# Patient Record
Sex: FEMALE | Race: WHITE | Employment: OTHER | URBAN - METROPOLITAN AREA
[De-identification: names, ages, dates, MRNs, and addresses within clinical notes are randomized per-mention and may not be internally consistent; named-entity substitution may affect disease eponyms.]

---

## 2018-09-27 ENCOUNTER — HOSPITAL ENCOUNTER (OUTPATIENT)
Age: 83
Discharge: HOME | End: 2018-09-27
Payer: MEDICARE

## 2018-09-27 LAB
ALBUMIN SERPL-MCNC: 4.2 G/DL (ref 3.5–5.2)
ALP BLD-CCNC: 48 U/L (ref 35–104)
ALT SERPL-CCNC: 14 U/L (ref 0–32)
ANION GAP SERPL CALCULATED.3IONS-SCNC: 15 MMOL/L (ref 7–16)
AST SERPL-CCNC: 21 U/L (ref 0–31)
BASOPHILS ABSOLUTE: 0.03 E9/L (ref 0–0.2)
BASOPHILS RELATIVE PERCENT: 0.4 % (ref 0–2)
BILIRUB SERPL-MCNC: 0.4 MG/DL (ref 0–1.2)
BUN BLDV-MCNC: 12 MG/DL (ref 8–23)
C-REACTIVE PROTEIN: 0.1 MG/DL (ref 0–0.4)
CALCIUM SERPL-MCNC: 9.6 MG/DL (ref 8.6–10.2)
CHLORIDE BLD-SCNC: 98 MMOL/L (ref 98–107)
CO2: 30 MMOL/L (ref 22–29)
CREAT SERPL-MCNC: 0.8 MG/DL (ref 0.5–1)
EOSINOPHILS ABSOLUTE: 0 E9/L (ref 0.05–0.5)
EOSINOPHILS RELATIVE PERCENT: 0 % (ref 0–6)
GFR AFRICAN AMERICAN: >60
GFR NON-AFRICAN AMERICAN: >60 ML/MIN/1.73
GLUCOSE BLD-MCNC: 144 MG/DL (ref 74–109)
HCT VFR BLD CALC: 43.9 % (ref 34–48)
HEMOGLOBIN: 14.5 G/DL (ref 11.5–15.5)
IMMATURE GRANULOCYTES #: 0.04 E9/L
IMMATURE GRANULOCYTES %: 0.5 % (ref 0–5)
LYMPHOCYTES ABSOLUTE: 0.79 E9/L (ref 1.5–4)
LYMPHOCYTES RELATIVE PERCENT: 10.6 % (ref 20–42)
MCH RBC QN AUTO: 31.9 PG (ref 26–35)
MCHC RBC AUTO-ENTMCNC: 33 % (ref 32–34.5)
MCV RBC AUTO: 96.7 FL (ref 80–99.9)
MONOCYTES ABSOLUTE: 0.34 E9/L (ref 0.1–0.95)
MONOCYTES RELATIVE PERCENT: 4.6 % (ref 2–12)
NEUTROPHILS ABSOLUTE: 6.22 E9/L (ref 1.8–7.3)
NEUTROPHILS RELATIVE PERCENT: 83.9 % (ref 43–80)
PDW BLD-RTO: 13.1 FL (ref 11.5–15)
PLATELET # BLD: 206 E9/L (ref 130–450)
PMV BLD AUTO: 10.3 FL (ref 7–12)
POTASSIUM SERPL-SCNC: 4 MMOL/L (ref 3.5–5)
RBC # BLD: 4.54 E12/L (ref 3.5–5.5)
SEDIMENTATION RATE, ERYTHROCYTE: 15 MM/HR (ref 0–20)
SODIUM BLD-SCNC: 143 MMOL/L (ref 132–146)
TOTAL PROTEIN: 7 G/DL (ref 6.4–8.3)
WBC # BLD: 7.4 E9/L (ref 4.5–11.5)

## 2018-09-27 PROCEDURE — 82784 ASSAY IGA/IGD/IGG/IGM EACH: CPT

## 2018-09-27 PROCEDURE — 80053 COMPREHEN METABOLIC PANEL: CPT

## 2018-09-27 PROCEDURE — 83516 IMMUNOASSAY NONANTIBODY: CPT

## 2018-09-27 PROCEDURE — 85651 RBC SED RATE NONAUTOMATED: CPT

## 2018-09-27 PROCEDURE — 85025 COMPLETE CBC W/AUTO DIFF WBC: CPT

## 2018-09-27 PROCEDURE — 86316 IMMUNOASSAY TUMOR OTHER: CPT

## 2018-09-27 PROCEDURE — 36415 COLL VENOUS BLD VENIPUNCTURE: CPT

## 2018-09-27 PROCEDURE — 86140 C-REACTIVE PROTEIN: CPT

## 2018-09-28 LAB — IGA: 102 MG/DL (ref 70–400)

## 2018-09-30 LAB
CHROMOGRANIN A: 290 NG/ML (ref 0–95)
GLIADIN ANTIBODIES IGA: 3 UNITS (ref 0–19)
GLIADIN ANTIBODIES IGG: 4 UNITS (ref 0–19)
TISSUE TRANSGLUTAMINASE ANTIBODY: 2 U/ML (ref 0–5)
TISSUE TRANSGLUTAMINASE IGA: 0 U/ML (ref 0–3)

## 2023-02-17 ENCOUNTER — APPOINTMENT (OUTPATIENT)
Dept: GENERAL RADIOLOGY | Age: 88
End: 2023-02-17
Payer: MEDICARE

## 2023-02-17 ENCOUNTER — APPOINTMENT (OUTPATIENT)
Dept: CT IMAGING | Age: 88
End: 2023-02-17
Payer: MEDICARE

## 2023-02-17 ENCOUNTER — HOSPITAL ENCOUNTER (INPATIENT)
Age: 88
LOS: 5 days | Discharge: OTHER INSTITUTION | End: 2023-02-23
Attending: EMERGENCY MEDICINE | Admitting: INTERNAL MEDICINE
Payer: MEDICARE

## 2023-02-17 DIAGNOSIS — J96.01 ACUTE RESPIRATORY FAILURE WITH HYPOXIA (HCC): ICD-10-CM

## 2023-02-17 DIAGNOSIS — K56.609 SBO (SMALL BOWEL OBSTRUCTION) (HCC): ICD-10-CM

## 2023-02-17 DIAGNOSIS — R79.89 ELEVATED LACTIC ACID LEVEL: ICD-10-CM

## 2023-02-17 DIAGNOSIS — R73.9 HYPERGLYCEMIA: ICD-10-CM

## 2023-02-17 DIAGNOSIS — N17.9 AKI (ACUTE KIDNEY INJURY) (HCC): ICD-10-CM

## 2023-02-17 DIAGNOSIS — J18.9 PNEUMONIA OF LEFT LUNG DUE TO INFECTIOUS ORGANISM, UNSPECIFIED PART OF LUNG: Primary | ICD-10-CM

## 2023-02-17 LAB
ALBUMIN SERPL-MCNC: 3.2 G/DL (ref 3.5–5.2)
ALP BLD-CCNC: 41 U/L (ref 35–104)
ALT SERPL-CCNC: 10 U/L (ref 0–32)
ANION GAP SERPL CALCULATED.3IONS-SCNC: 18 MMOL/L (ref 7–16)
APTT: 29.1 SEC (ref 24.5–35.1)
AST SERPL-CCNC: 22 U/L (ref 0–31)
BASOPHILS ABSOLUTE: 0 E9/L (ref 0–0.2)
BASOPHILS RELATIVE PERCENT: 0 % (ref 0–2)
BETA-HYDROXYBUTYRATE: 2.24 MMOL/L (ref 0.02–0.27)
BILIRUB SERPL-MCNC: 0.8 MG/DL (ref 0–1.2)
BUN BLDV-MCNC: 68 MG/DL (ref 6–23)
BURR CELLS: ABNORMAL
CALCIUM SERPL-MCNC: 9.2 MG/DL (ref 8.6–10.2)
CHLORIDE BLD-SCNC: 85 MMOL/L (ref 98–107)
CO2: 28 MMOL/L (ref 22–29)
CREAT SERPL-MCNC: 2.1 MG/DL (ref 0.5–1)
EOSINOPHILS ABSOLUTE: 0 E9/L (ref 0.05–0.5)
EOSINOPHILS RELATIVE PERCENT: 0 % (ref 0–6)
GFR SERPL CREATININE-BSD FRML MDRD: 21 ML/MIN/1.73
GLUCOSE BLD-MCNC: 236 MG/DL (ref 74–99)
HCT VFR BLD CALC: 43.7 % (ref 34–48)
HEMOGLOBIN: 14.6 G/DL (ref 11.5–15.5)
INFLUENZA A BY PCR: NOT DETECTED
INFLUENZA B BY PCR: NOT DETECTED
INR BLD: 1.3
LACTIC ACID, SEPSIS: 4.2 MMOL/L (ref 0.5–1.9)
LYMPHOCYTES ABSOLUTE: 0.53 E9/L (ref 1.5–4)
LYMPHOCYTES RELATIVE PERCENT: 5 % (ref 20–42)
MCH RBC QN AUTO: 33.6 PG (ref 26–35)
MCHC RBC AUTO-ENTMCNC: 33.4 % (ref 32–34.5)
MCV RBC AUTO: 100.5 FL (ref 80–99.9)
METAMYELOCYTES RELATIVE PERCENT: 1 % (ref 0–1)
MONOCYTES ABSOLUTE: 0.21 E9/L (ref 0.1–0.95)
MONOCYTES RELATIVE PERCENT: 2 % (ref 2–12)
NEUTROPHILS ABSOLUTE: 9.86 E9/L (ref 1.8–7.3)
NEUTROPHILS RELATIVE PERCENT: 92 % (ref 43–80)
OVALOCYTES: ABNORMAL
PDW BLD-RTO: 13 FL (ref 11.5–15)
PH VENOUS: 7.53 (ref 7.35–7.45)
PLATELET # BLD: 144 E9/L (ref 130–450)
PMV BLD AUTO: 13.1 FL (ref 7–12)
POIKILOCYTES: ABNORMAL
POLYCHROMASIA: ABNORMAL
POTASSIUM REFLEX MAGNESIUM: 4.6 MMOL/L (ref 3.5–5)
PROTHROMBIN TIME: 15.3 SEC (ref 9.3–12.4)
RBC # BLD: 4.35 E12/L (ref 3.5–5.5)
SARS-COV-2, NAAT: NOT DETECTED
SODIUM BLD-SCNC: 131 MMOL/L (ref 132–146)
TOTAL PROTEIN: 5.5 G/DL (ref 6.4–8.3)
TROPONIN, HIGH SENSITIVITY: 167 NG/L (ref 0–9)
WBC # BLD: 10.6 E9/L (ref 4.5–11.5)

## 2023-02-17 PROCEDURE — 82800 BLOOD PH: CPT

## 2023-02-17 PROCEDURE — 80053 COMPREHEN METABOLIC PANEL: CPT

## 2023-02-17 PROCEDURE — 82533 TOTAL CORTISOL: CPT

## 2023-02-17 PROCEDURE — 84145 PROCALCITONIN (PCT): CPT

## 2023-02-17 PROCEDURE — 99285 EMERGENCY DEPT VISIT HI MDM: CPT

## 2023-02-17 PROCEDURE — 74176 CT ABD & PELVIS W/O CONTRAST: CPT

## 2023-02-17 PROCEDURE — 87040 BLOOD CULTURE FOR BACTERIA: CPT

## 2023-02-17 PROCEDURE — 85025 COMPLETE CBC W/AUTO DIFF WBC: CPT

## 2023-02-17 PROCEDURE — 83605 ASSAY OF LACTIC ACID: CPT

## 2023-02-17 PROCEDURE — 71045 X-RAY EXAM CHEST 1 VIEW: CPT

## 2023-02-17 PROCEDURE — 96374 THER/PROPH/DIAG INJ IV PUSH: CPT

## 2023-02-17 PROCEDURE — 93005 ELECTROCARDIOGRAM TRACING: CPT | Performed by: STUDENT IN AN ORGANIZED HEALTH CARE EDUCATION/TRAINING PROGRAM

## 2023-02-17 PROCEDURE — 84484 ASSAY OF TROPONIN QUANT: CPT

## 2023-02-17 PROCEDURE — 96361 HYDRATE IV INFUSION ADD-ON: CPT

## 2023-02-17 PROCEDURE — 6360000002 HC RX W HCPCS: Performed by: STUDENT IN AN ORGANIZED HEALTH CARE EDUCATION/TRAINING PROGRAM

## 2023-02-17 PROCEDURE — 71250 CT THORAX DX C-: CPT

## 2023-02-17 PROCEDURE — 85730 THROMBOPLASTIN TIME PARTIAL: CPT

## 2023-02-17 PROCEDURE — 51702 INSERT TEMP BLADDER CATH: CPT

## 2023-02-17 PROCEDURE — 85610 PROTHROMBIN TIME: CPT

## 2023-02-17 PROCEDURE — 82010 KETONE BODYS QUAN: CPT

## 2023-02-17 PROCEDURE — 87635 SARS-COV-2 COVID-19 AMP PRB: CPT

## 2023-02-17 PROCEDURE — 87502 INFLUENZA DNA AMP PROBE: CPT

## 2023-02-17 PROCEDURE — 2580000003 HC RX 258: Performed by: STUDENT IN AN ORGANIZED HEALTH CARE EDUCATION/TRAINING PROGRAM

## 2023-02-17 PROCEDURE — 36415 COLL VENOUS BLD VENIPUNCTURE: CPT

## 2023-02-17 RX ORDER — 0.9 % SODIUM CHLORIDE 0.9 %
1000 INTRAVENOUS SOLUTION INTRAVENOUS ONCE
Status: COMPLETED | OUTPATIENT
Start: 2023-02-17 | End: 2023-02-18

## 2023-02-17 RX ORDER — 0.9 % SODIUM CHLORIDE 0.9 %
500 INTRAVENOUS SOLUTION INTRAVENOUS ONCE
Status: COMPLETED | OUTPATIENT
Start: 2023-02-17 | End: 2023-02-17

## 2023-02-17 RX ORDER — ONDANSETRON 2 MG/ML
4 INJECTION INTRAMUSCULAR; INTRAVENOUS ONCE
Status: COMPLETED | OUTPATIENT
Start: 2023-02-17 | End: 2023-02-17

## 2023-02-17 RX ADMIN — SODIUM CHLORIDE 500 ML: 9 INJECTION, SOLUTION INTRAVENOUS at 21:51

## 2023-02-17 RX ADMIN — ONDANSETRON 4 MG: 2 INJECTION INTRAMUSCULAR; INTRAVENOUS at 21:52

## 2023-02-17 ASSESSMENT — ENCOUNTER SYMPTOMS
DIARRHEA: 0
VOMITING: 1
SHORTNESS OF BREATH: 0
BACK PAIN: 0
NAUSEA: 1
COUGH: 0
ABDOMINAL PAIN: 1
COLOR CHANGE: 0

## 2023-02-17 ASSESSMENT — LIFESTYLE VARIABLES: HOW OFTEN DO YOU HAVE A DRINK CONTAINING ALCOHOL: NEVER

## 2023-02-18 ENCOUNTER — APPOINTMENT (OUTPATIENT)
Dept: CT IMAGING | Age: 88
End: 2023-02-18
Payer: MEDICARE

## 2023-02-18 ENCOUNTER — ANESTHESIA (OUTPATIENT)
Dept: OPERATING ROOM | Age: 88
End: 2023-02-18
Payer: MEDICARE

## 2023-02-18 ENCOUNTER — APPOINTMENT (OUTPATIENT)
Dept: GENERAL RADIOLOGY | Age: 88
End: 2023-02-18
Payer: MEDICARE

## 2023-02-18 ENCOUNTER — ANESTHESIA EVENT (OUTPATIENT)
Dept: OPERATING ROOM | Age: 88
End: 2023-02-18
Payer: MEDICARE

## 2023-02-18 PROBLEM — J18.9 PNEUMONIA OF LEFT LUNG DUE TO INFECTIOUS ORGANISM: Status: ACTIVE | Noted: 2023-02-18

## 2023-02-18 PROBLEM — K56.609 SBO (SMALL BOWEL OBSTRUCTION) (HCC): Status: ACTIVE | Noted: 2023-02-18

## 2023-02-18 PROBLEM — K40.30 INCARCERATED INGUINAL HERNIA, UNILATERAL: Status: ACTIVE | Noted: 2023-02-18

## 2023-02-18 LAB
ALBUMIN SERPL-MCNC: 2.6 G/DL (ref 3.5–5.2)
ALP BLD-CCNC: 39 U/L (ref 35–104)
ALT SERPL-CCNC: 9 U/L (ref 0–32)
ANION GAP SERPL CALCULATED.3IONS-SCNC: 18 MMOL/L (ref 7–16)
AST SERPL-CCNC: 22 U/L (ref 0–31)
BACTERIA: ABNORMAL /HPF
BASOPHILS ABSOLUTE: 0 E9/L (ref 0–0.2)
BASOPHILS RELATIVE PERCENT: 0.5 % (ref 0–2)
BILIRUB SERPL-MCNC: 0.7 MG/DL (ref 0–1.2)
BILIRUBIN URINE: NEGATIVE
BLOOD, URINE: NEGATIVE
BUN BLDV-MCNC: 66 MG/DL (ref 6–23)
CALCIUM SERPL-MCNC: 8.3 MG/DL (ref 8.6–10.2)
CHLORIDE BLD-SCNC: 93 MMOL/L (ref 98–107)
CHOLESTEROL, TOTAL: 88 MG/DL (ref 0–199)
CLARITY: ABNORMAL
CO2: 26 MMOL/L (ref 22–29)
COLOR: YELLOW
CORTISOL TOTAL: 48.41 MCG/DL (ref 2.68–18.4)
CREAT SERPL-MCNC: 1.8 MG/DL (ref 0.5–1)
EKG ATRIAL RATE: 79 BPM
EKG Q-T INTERVAL: 472 MS
EKG QRS DURATION: 150 MS
EKG QTC CALCULATION (BAZETT): 509 MS
EKG R AXIS: -80 DEGREES
EKG T AXIS: 82 DEGREES
EKG VENTRICULAR RATE: 70 BPM
EOSINOPHILS ABSOLUTE: 0 E9/L (ref 0.05–0.5)
EOSINOPHILS RELATIVE PERCENT: 0 % (ref 0–6)
FOLATE: >20 NG/ML (ref 4.8–24.2)
GFR SERPL CREATININE-BSD FRML MDRD: 26 ML/MIN/1.73
GLUCOSE BLD-MCNC: 195 MG/DL (ref 74–99)
GLUCOSE URINE: NEGATIVE MG/DL
HBA1C MFR BLD: 9.5 % (ref 4–5.6)
HCT VFR BLD CALC: 41.2 % (ref 34–48)
HDLC SERPL-MCNC: 38 MG/DL
HEMOGLOBIN: 13.2 G/DL (ref 11.5–15.5)
IRON SATURATION: 13 % (ref 15–50)
IRON: 27 MCG/DL (ref 37–145)
KETONES, URINE: NEGATIVE MG/DL
LACTIC ACID, SEPSIS: 1.7 MMOL/L (ref 0.5–1.9)
LACTIC ACID: 2.1 MMOL/L (ref 0.5–2.2)
LDL CHOLESTEROL CALCULATED: 23 MG/DL (ref 0–99)
LEUKOCYTE ESTERASE, URINE: ABNORMAL
LV EF: 60 %
LVEF MODALITY: NORMAL
LYMPHOCYTES ABSOLUTE: 1.22 E9/L (ref 1.5–4)
LYMPHOCYTES RELATIVE PERCENT: 10.4 % (ref 20–42)
MAGNESIUM: 1.9 MG/DL (ref 1.6–2.6)
MCH RBC QN AUTO: 32.7 PG (ref 26–35)
MCHC RBC AUTO-ENTMCNC: 32 % (ref 32–34.5)
MCV RBC AUTO: 102 FL (ref 80–99.9)
METAMYELOCYTES RELATIVE PERCENT: 1.7 % (ref 0–1)
METER GLUCOSE: 155 MG/DL (ref 74–99)
MONOCYTES ABSOLUTE: 0.49 E9/L (ref 0.1–0.95)
MONOCYTES RELATIVE PERCENT: 3.5 % (ref 2–12)
NEUTROPHILS ABSOLUTE: 10.49 E9/L (ref 1.8–7.3)
NEUTROPHILS RELATIVE PERCENT: 84.3 % (ref 43–80)
NITRITE, URINE: NEGATIVE
PDW BLD-RTO: 13.2 FL (ref 11.5–15)
PH UA: 5 (ref 5–9)
PHOSPHORUS: 5 MG/DL (ref 2.5–4.5)
PLATELET # BLD: 125 E9/L (ref 130–450)
PMV BLD AUTO: 12.6 FL (ref 7–12)
POTASSIUM SERPL-SCNC: 4.2 MMOL/L (ref 3.5–5)
PROCALCITONIN: 13.38 NG/ML (ref 0–0.08)
PROTEIN UA: ABNORMAL MG/DL
RBC # BLD: 4.04 E12/L (ref 3.5–5.5)
RBC UA: ABNORMAL /HPF (ref 0–2)
SODIUM BLD-SCNC: 137 MMOL/L (ref 132–146)
SPECIFIC GRAVITY UA: >=1.03 (ref 1–1.03)
TOTAL IRON BINDING CAPACITY: 202 MCG/DL (ref 250–450)
TOTAL PROTEIN: 5.2 G/DL (ref 6.4–8.3)
TRIGL SERPL-MCNC: 134 MG/DL (ref 0–149)
TROPONIN, HIGH SENSITIVITY: 120 NG/L (ref 0–9)
TSH SERPL DL<=0.05 MIU/L-ACNC: 0.78 UIU/ML (ref 0.27–4.2)
UROBILINOGEN, URINE: 0.2 E.U./DL
VITAMIN B-12: 238 PG/ML (ref 211–946)
VLDLC SERPL CALC-MCNC: 27 MG/DL
WBC # BLD: 12.2 E9/L (ref 4.5–11.5)
WBC UA: >20 /HPF (ref 0–5)

## 2023-02-18 PROCEDURE — 3700000001 HC ADD 15 MINUTES (ANESTHESIA): Performed by: SURGERY

## 2023-02-18 PROCEDURE — 85025 COMPLETE CBC W/AUTO DIFF WBC: CPT

## 2023-02-18 PROCEDURE — 82746 ASSAY OF FOLIC ACID SERUM: CPT

## 2023-02-18 PROCEDURE — 6370000000 HC RX 637 (ALT 250 FOR IP): Performed by: SURGERY

## 2023-02-18 PROCEDURE — 6360000002 HC RX W HCPCS: Performed by: SURGERY

## 2023-02-18 PROCEDURE — 36415 COLL VENOUS BLD VENIPUNCTURE: CPT

## 2023-02-18 PROCEDURE — 6360000004 HC RX CONTRAST MEDICATION: Performed by: INTERNAL MEDICINE

## 2023-02-18 PROCEDURE — 82607 VITAMIN B-12: CPT

## 2023-02-18 PROCEDURE — 83735 ASSAY OF MAGNESIUM: CPT

## 2023-02-18 PROCEDURE — 74018 RADEX ABDOMEN 1 VIEW: CPT

## 2023-02-18 PROCEDURE — 84100 ASSAY OF PHOSPHORUS: CPT

## 2023-02-18 PROCEDURE — 83036 HEMOGLOBIN GLYCOSYLATED A1C: CPT

## 2023-02-18 PROCEDURE — 82962 GLUCOSE BLOOD TEST: CPT

## 2023-02-18 PROCEDURE — 84484 ASSAY OF TROPONIN QUANT: CPT

## 2023-02-18 PROCEDURE — 2580000003 HC RX 258: Performed by: NURSE ANESTHETIST, CERTIFIED REGISTERED

## 2023-02-18 PROCEDURE — 74176 CT ABD & PELVIS W/O CONTRAST: CPT

## 2023-02-18 PROCEDURE — 99222 1ST HOSP IP/OBS MODERATE 55: CPT | Performed by: SURGERY

## 2023-02-18 PROCEDURE — 80061 LIPID PANEL: CPT

## 2023-02-18 PROCEDURE — 3600000004 HC SURGERY LEVEL 4 BASE: Performed by: SURGERY

## 2023-02-18 PROCEDURE — 3700000000 HC ANESTHESIA ATTENDED CARE: Performed by: SURGERY

## 2023-02-18 PROCEDURE — 6360000002 HC RX W HCPCS: Performed by: STUDENT IN AN ORGANIZED HEALTH CARE EDUCATION/TRAINING PROGRAM

## 2023-02-18 PROCEDURE — 83550 IRON BINDING TEST: CPT

## 2023-02-18 PROCEDURE — 6360000002 HC RX W HCPCS: Performed by: INTERNAL MEDICINE

## 2023-02-18 PROCEDURE — 83540 ASSAY OF IRON: CPT

## 2023-02-18 PROCEDURE — C1781 MESH (IMPLANTABLE): HCPCS | Performed by: SURGERY

## 2023-02-18 PROCEDURE — 2580000003 HC RX 258: Performed by: NURSE PRACTITIONER

## 2023-02-18 PROCEDURE — C9113 INJ PANTOPRAZOLE SODIUM, VIA: HCPCS | Performed by: INTERNAL MEDICINE

## 2023-02-18 PROCEDURE — 2500000003 HC RX 250 WO HCPCS: Performed by: SURGERY

## 2023-02-18 PROCEDURE — 3600000014 HC SURGERY LEVEL 4 ADDTL 15MIN: Performed by: SURGERY

## 2023-02-18 PROCEDURE — 87081 CULTURE SCREEN ONLY: CPT

## 2023-02-18 PROCEDURE — 0YU50JZ SUPPLEMENT RIGHT INGUINAL REGION WITH SYNTHETIC SUBSTITUTE, OPEN APPROACH: ICD-10-PCS | Performed by: SURGERY

## 2023-02-18 PROCEDURE — 2580000003 HC RX 258: Performed by: SURGERY

## 2023-02-18 PROCEDURE — 2580000003 HC RX 258: Performed by: STUDENT IN AN ORGANIZED HEALTH CARE EDUCATION/TRAINING PROGRAM

## 2023-02-18 PROCEDURE — 81001 URINALYSIS AUTO W/SCOPE: CPT

## 2023-02-18 PROCEDURE — 6370000000 HC RX 637 (ALT 250 FOR IP): Performed by: INTERNAL MEDICINE

## 2023-02-18 PROCEDURE — 94640 AIRWAY INHALATION TREATMENT: CPT

## 2023-02-18 PROCEDURE — 2500000003 HC RX 250 WO HCPCS: Performed by: NURSE ANESTHETIST, CERTIFIED REGISTERED

## 2023-02-18 PROCEDURE — 2000000000 HC ICU R&B

## 2023-02-18 PROCEDURE — 80053 COMPREHEN METABOLIC PANEL: CPT

## 2023-02-18 PROCEDURE — 7100000001 HC PACU RECOVERY - ADDTL 15 MIN

## 2023-02-18 PROCEDURE — 2709999900 HC NON-CHARGEABLE SUPPLY: Performed by: SURGERY

## 2023-02-18 PROCEDURE — 6360000002 HC RX W HCPCS: Performed by: NURSE ANESTHETIST, CERTIFIED REGISTERED

## 2023-02-18 PROCEDURE — 2580000003 HC RX 258: Performed by: INTERNAL MEDICINE

## 2023-02-18 PROCEDURE — 7100000000 HC PACU RECOVERY - FIRST 15 MIN

## 2023-02-18 PROCEDURE — 83605 ASSAY OF LACTIC ACID: CPT

## 2023-02-18 PROCEDURE — 2720000010 HC SURG SUPPLY STERILE: Performed by: SURGERY

## 2023-02-18 PROCEDURE — 51702 INSERT TEMP BLADDER CATH: CPT

## 2023-02-18 PROCEDURE — 84443 ASSAY THYROID STIM HORMONE: CPT

## 2023-02-18 PROCEDURE — 93306 TTE W/DOPPLER COMPLETE: CPT

## 2023-02-18 PROCEDURE — 93010 ELECTROCARDIOGRAM REPORT: CPT | Performed by: INTERNAL MEDICINE

## 2023-02-18 PROCEDURE — 49650 LAP ING HERNIA REPAIR INIT: CPT | Performed by: SURGERY

## 2023-02-18 DEVICE — MESH HERN L W4.1XL6.2IN R POLYPR L PORE KNIT LT 3DMAX: Type: IMPLANTABLE DEVICE | Site: INGUINAL | Status: FUNCTIONAL

## 2023-02-18 RX ORDER — FENTANYL CITRATE 50 UG/ML
INJECTION, SOLUTION INTRAMUSCULAR; INTRAVENOUS PRN
Status: DISCONTINUED | OUTPATIENT
Start: 2023-02-18 | End: 2023-02-18 | Stop reason: SDUPTHER

## 2023-02-18 RX ORDER — SUCCINYLCHOLINE CHLORIDE 20 MG/ML
INJECTION INTRAMUSCULAR; INTRAVENOUS PRN
Status: DISCONTINUED | OUTPATIENT
Start: 2023-02-18 | End: 2023-02-18 | Stop reason: SDUPTHER

## 2023-02-18 RX ORDER — ENOXAPARIN SODIUM 100 MG/ML
30 INJECTION SUBCUTANEOUS DAILY
Status: DISCONTINUED | OUTPATIENT
Start: 2023-02-18 | End: 2023-02-18 | Stop reason: CLARIF

## 2023-02-18 RX ORDER — IPRATROPIUM BROMIDE AND ALBUTEROL SULFATE 2.5; .5 MG/3ML; MG/3ML
1 SOLUTION RESPIRATORY (INHALATION) EVERY 4 HOURS
Status: DISCONTINUED | OUTPATIENT
Start: 2023-02-18 | End: 2023-02-18 | Stop reason: RX

## 2023-02-18 RX ORDER — DEXAMETHASONE SODIUM PHOSPHATE 10 MG/ML
INJECTION, SOLUTION INTRAMUSCULAR; INTRAVENOUS PRN
Status: DISCONTINUED | OUTPATIENT
Start: 2023-02-18 | End: 2023-02-18 | Stop reason: SDUPTHER

## 2023-02-18 RX ORDER — ONDANSETRON 2 MG/ML
INJECTION INTRAMUSCULAR; INTRAVENOUS PRN
Status: DISCONTINUED | OUTPATIENT
Start: 2023-02-18 | End: 2023-02-18 | Stop reason: SDUPTHER

## 2023-02-18 RX ORDER — INSULIN LISPRO 100 [IU]/ML
0-4 INJECTION, SOLUTION INTRAVENOUS; SUBCUTANEOUS EVERY 6 HOURS
Status: DISCONTINUED | OUTPATIENT
Start: 2023-02-18 | End: 2023-02-20

## 2023-02-18 RX ORDER — BUDESONIDE 0.5 MG/2ML
0.5 INHALANT ORAL 2 TIMES DAILY
Status: DISCONTINUED | OUTPATIENT
Start: 2023-02-18 | End: 2023-02-23 | Stop reason: HOSPADM

## 2023-02-18 RX ORDER — SODIUM CHLORIDE, SODIUM LACTATE, POTASSIUM CHLORIDE, CALCIUM CHLORIDE 600; 310; 30; 20 MG/100ML; MG/100ML; MG/100ML; MG/100ML
INJECTION, SOLUTION INTRAVENOUS CONTINUOUS
Status: DISCONTINUED | OUTPATIENT
Start: 2023-02-18 | End: 2023-02-20

## 2023-02-18 RX ORDER — PHENYLEPHRINE HYDROCHLORIDE 10 MG/ML
INJECTION INTRAVENOUS PRN
Status: DISCONTINUED | OUTPATIENT
Start: 2023-02-18 | End: 2023-02-18 | Stop reason: SDUPTHER

## 2023-02-18 RX ORDER — PROPOFOL 10 MG/ML
INJECTION, EMULSION INTRAVENOUS PRN
Status: DISCONTINUED | OUTPATIENT
Start: 2023-02-18 | End: 2023-02-18 | Stop reason: SDUPTHER

## 2023-02-18 RX ORDER — LIDOCAINE HYDROCHLORIDE 20 MG/ML
INJECTION, SOLUTION INTRAVENOUS PRN
Status: DISCONTINUED | OUTPATIENT
Start: 2023-02-18 | End: 2023-02-18 | Stop reason: SDUPTHER

## 2023-02-18 RX ORDER — BUPIVACAINE HYDROCHLORIDE AND EPINEPHRINE 2.5; 5 MG/ML; UG/ML
INJECTION, SOLUTION EPIDURAL; INFILTRATION; INTRACAUDAL; PERINEURAL PRN
Status: DISCONTINUED | OUTPATIENT
Start: 2023-02-18 | End: 2023-02-18 | Stop reason: ALTCHOICE

## 2023-02-18 RX ORDER — SODIUM CHLORIDE 9 MG/ML
INJECTION, SOLUTION INTRAVENOUS CONTINUOUS PRN
Status: DISCONTINUED | OUTPATIENT
Start: 2023-02-18 | End: 2023-02-18 | Stop reason: SDUPTHER

## 2023-02-18 RX ORDER — MESALAMINE 1.2 G/1
1200 TABLET, DELAYED RELEASE ORAL
COMMUNITY

## 2023-02-18 RX ORDER — DEXTROSE MONOHYDRATE 100 MG/ML
INJECTION, SOLUTION INTRAVENOUS CONTINUOUS PRN
Status: DISCONTINUED | OUTPATIENT
Start: 2023-02-18 | End: 2023-02-20

## 2023-02-18 RX ORDER — HEPARIN SODIUM 5000 [USP'U]/ML
5000 INJECTION, SOLUTION INTRAVENOUS; SUBCUTANEOUS EVERY 8 HOURS SCHEDULED
Status: DISCONTINUED | OUTPATIENT
Start: 2023-02-18 | End: 2023-02-19

## 2023-02-18 RX ORDER — PANTOPRAZOLE SODIUM 40 MG/10ML
40 INJECTION, POWDER, LYOPHILIZED, FOR SOLUTION INTRAVENOUS DAILY
Status: DISCONTINUED | OUTPATIENT
Start: 2023-02-18 | End: 2023-02-20

## 2023-02-18 RX ORDER — SODIUM CHLORIDE 0.9 % (FLUSH) 0.9 %
5-40 SYRINGE (ML) INJECTION PRN
Status: DISCONTINUED | OUTPATIENT
Start: 2023-02-18 | End: 2023-02-20

## 2023-02-18 RX ORDER — SODIUM CHLORIDE 0.9 % (FLUSH) 0.9 %
5-40 SYRINGE (ML) INJECTION 2 TIMES DAILY
Status: DISCONTINUED | OUTPATIENT
Start: 2023-02-18 | End: 2023-02-23 | Stop reason: HOSPADM

## 2023-02-18 RX ORDER — ARFORMOTEROL TARTRATE 15 UG/2ML
15 SOLUTION RESPIRATORY (INHALATION) 2 TIMES DAILY
Status: DISCONTINUED | OUTPATIENT
Start: 2023-02-18 | End: 2023-02-23 | Stop reason: HOSPADM

## 2023-02-18 RX ORDER — APIXABAN 2.5 MG/1
TABLET, FILM COATED ORAL
COMMUNITY
Start: 2023-02-06

## 2023-02-18 RX ORDER — FUROSEMIDE 10 MG/ML
40 INJECTION INTRAMUSCULAR; INTRAVENOUS ONCE
Status: COMPLETED | OUTPATIENT
Start: 2023-02-18 | End: 2023-02-18

## 2023-02-18 RX ORDER — ROCURONIUM BROMIDE 10 MG/ML
INJECTION, SOLUTION INTRAVENOUS PRN
Status: DISCONTINUED | OUTPATIENT
Start: 2023-02-18 | End: 2023-02-18 | Stop reason: SDUPTHER

## 2023-02-18 RX ORDER — ALBUTEROL SULFATE 2.5 MG/3ML
2.5 SOLUTION RESPIRATORY (INHALATION) EVERY 4 HOURS
Status: DISCONTINUED | OUTPATIENT
Start: 2023-02-18 | End: 2023-02-23 | Stop reason: HOSPADM

## 2023-02-18 RX ORDER — CEFDINIR 300 MG/1
300 CAPSULE ORAL 2 TIMES DAILY
Status: ON HOLD | COMMUNITY
End: 2023-02-23 | Stop reason: HOSPADM

## 2023-02-18 RX ADMIN — IPRATROPIUM BROMIDE AND ALBUTEROL SULFATE 1 AMPULE: 2.5; .5 SOLUTION RESPIRATORY (INHALATION) at 17:11

## 2023-02-18 RX ADMIN — PROPOFOL 150 MG: 10 INJECTION, EMULSION INTRAVENOUS at 14:08

## 2023-02-18 RX ADMIN — SODIUM CHLORIDE: 900 INJECTION, SOLUTION INTRAVENOUS at 14:42

## 2023-02-18 RX ADMIN — FENTANYL CITRATE 50 MCG: 50 INJECTION, SOLUTION INTRAMUSCULAR; INTRAVENOUS at 14:08

## 2023-02-18 RX ADMIN — ARFORMOTEROL TARTRATE 15 MCG: 15 SOLUTION RESPIRATORY (INHALATION) at 17:12

## 2023-02-18 RX ADMIN — VANCOMYCIN HYDROCHLORIDE 1500 MG: 5 INJECTION, POWDER, LYOPHILIZED, FOR SOLUTION INTRAVENOUS at 04:35

## 2023-02-18 RX ADMIN — IPRATROPIUM BROMIDE AND ALBUTEROL SULFATE 1 AMPULE: 2.5; .5 SOLUTION RESPIRATORY (INHALATION) at 06:52

## 2023-02-18 RX ADMIN — LIDOCAINE HYDROCHLORIDE 50 MG: 20 INJECTION, SOLUTION INTRAVENOUS at 14:08

## 2023-02-18 RX ADMIN — SUGAMMADEX 104 MG: 100 INJECTION, SOLUTION INTRAVENOUS at 14:42

## 2023-02-18 RX ADMIN — PANTOPRAZOLE SODIUM 40 MG: 40 INJECTION, POWDER, FOR SOLUTION INTRAVENOUS at 09:02

## 2023-02-18 RX ADMIN — IPRATROPIUM BROMIDE AND ALBUTEROL SULFATE 1 AMPULE: 2.5; .5 SOLUTION RESPIRATORY (INHALATION) at 11:14

## 2023-02-18 RX ADMIN — SUCCINYLCHOLINE CHLORIDE 140 MG: 20 INJECTION, SOLUTION INTRAMUSCULAR; INTRAVENOUS at 14:08

## 2023-02-18 RX ADMIN — BUDESONIDE 500 MCG: 0.5 INHALANT RESPIRATORY (INHALATION) at 06:51

## 2023-02-18 RX ADMIN — ALBUTEROL SULFATE 2.5 MG: 2.5 SOLUTION RESPIRATORY (INHALATION) at 21:35

## 2023-02-18 RX ADMIN — PHENYLEPHRINE HYDROCHLORIDE 100 MCG: 10 INJECTION INTRAVENOUS at 14:11

## 2023-02-18 RX ADMIN — SODIUM CHLORIDE 1000 ML: 9 INJECTION, SOLUTION INTRAVENOUS at 00:58

## 2023-02-18 RX ADMIN — ARFORMOTEROL TARTRATE 15 MCG: 15 SOLUTION RESPIRATORY (INHALATION) at 06:51

## 2023-02-18 RX ADMIN — ONDANSETRON 4 MG: 2 INJECTION INTRAMUSCULAR; INTRAVENOUS at 14:40

## 2023-02-18 RX ADMIN — IPRATROPIUM BROMIDE 0.5 MG: 0.5 SOLUTION RESPIRATORY (INHALATION) at 21:36

## 2023-02-18 RX ADMIN — PHENYLEPHRINE HYDROCHLORIDE 100 MCG: 10 INJECTION INTRAVENOUS at 14:14

## 2023-02-18 RX ADMIN — PIPERACILLIN AND TAZOBACTAM 3375 MG: 3; .375 INJECTION, POWDER, FOR SOLUTION INTRAVENOUS at 15:36

## 2023-02-18 RX ADMIN — SODIUM CHLORIDE, PRESERVATIVE FREE 10 ML: 5 INJECTION INTRAVENOUS at 19:08

## 2023-02-18 RX ADMIN — DEXAMETHASONE SODIUM PHOSPHATE 10 MG: 10 INJECTION INTRAMUSCULAR; INTRAVENOUS at 14:08

## 2023-02-18 RX ADMIN — FUROSEMIDE 40 MG: 10 INJECTION, SOLUTION INTRAMUSCULAR; INTRAVENOUS at 15:23

## 2023-02-18 RX ADMIN — PERFLUTREN 1.5 ML: 6.52 INJECTION, SUSPENSION INTRAVENOUS at 13:06

## 2023-02-18 RX ADMIN — SODIUM CHLORIDE, POTASSIUM CHLORIDE, SODIUM LACTATE AND CALCIUM CHLORIDE: 600; 310; 30; 20 INJECTION, SOLUTION INTRAVENOUS at 07:42

## 2023-02-18 RX ADMIN — BUDESONIDE 500 MCG: 0.5 INHALANT RESPIRATORY (INHALATION) at 17:12

## 2023-02-18 RX ADMIN — HEPARIN SODIUM 5000 UNITS: 5000 INJECTION INTRAVENOUS; SUBCUTANEOUS at 09:01

## 2023-02-18 RX ADMIN — HEPARIN SODIUM 5000 UNITS: 5000 INJECTION INTRAVENOUS; SUBCUTANEOUS at 21:34

## 2023-02-18 RX ADMIN — ROCURONIUM BROMIDE 5 MG: 10 INJECTION, SOLUTION INTRAVENOUS at 14:08

## 2023-02-18 RX ADMIN — PIPERACILLIN AND TAZOBACTAM 4500 MG: 4; .5 INJECTION, POWDER, FOR SOLUTION INTRAVENOUS at 01:01

## 2023-02-18 RX ADMIN — PHENYLEPHRINE HYDROCHLORIDE 100 MCG: 10 INJECTION INTRAVENOUS at 14:17

## 2023-02-18 RX ADMIN — SODIUM CHLORIDE: 900 INJECTION, SOLUTION INTRAVENOUS at 13:59

## 2023-02-18 ASSESSMENT — PAIN SCALES - GENERAL: PAINLEVEL_OUTOF10: 0

## 2023-02-18 NOTE — OP NOTE
Operative Note  Ziggy Barney MD, MS Brooke Bond  MRN: [id number]  DATE OF PROCEDURE: 2/18/2023    PREOPERATIVE DIAGNOSIS: Incarcerated right inguinal hernia, SBO. POSTOPERATIVE DIAGNOSIS: Incarcerated strangulated right inguinal hernia, SBO     OPERATION: Diagnostic laparoscopy, release small bowel obstruction, incarcerated strangulated right inguinal hernia repair with mesh. ANESTHESIA: General endotracheal.     SURGEON: Ulla Mortimer, MD     ASSISTANT: First Lexi Verdugo     COMPLICATIONS: None. ESTIMATED BLOOD LOSS: Minimal.     FLUIDS: Crystalloid. SPECIMENS: none. Implant Name Type Inv. Item Serial No.  Lot No. LRB No. Used Action   MESH SANDRA L W4.1XL6.2IN R POLYPR L PORE KNIT LT 3DMAX - [id number]  MESH SANDRA L W4.1XL6.2IN R POLYPR L PORE KNIT LT 3DMAX  BARD DAVOL-WD [id number] Right 1 Implanted        INDICATIONS: Brooke Bond is a 80 y.o. female with small bowel obstruction and aspiration pneumonia presented with strangulated incarcerated right inguinal hernia. On physical exam, I was unable to appreciate or reduce any hernia or feel an inguinal hernia. After being explained the risks, benefits, and alternatives of procedure, including but not limited to bleeding, scar, infection, recurrence they agreed to proceed. DESCRIPTION OF PROCEDURE: They were taken to the operating room, placed supine and administered general anesthesia and intubated. Once the airway was secured and they were adequately sedated, they were prepped and draped in the normal sterile fashion. Timeout was performed to confirm surgical site and the patient's name. They received preoperative antibiotics in the form of IV. We initially made an 5-mm incision superior to the umbilicus, inserted a Veress needle, confirmed the needle placement and insufflated to 15 mmHg.  We then removed the Veress needle, inserted an 5-mm trocar, inserted the camera and, following, inspected the abdomen. Upon entrance into the abdomen, we identified small bowel obstruction with incarcerated bowel in the right indirect inguinal hernia. The patient was placed in steep Trendelenburg position and two more 5 mm trocars, 1 was inserted in the right lateral abdomen and 1 in the left lateral abdomen. I used atraumatic graspers in order to reduce the small bowel from the inguinal canal.  Upon release the small bowel from the inguinal canal this was the obvious transition point of the bowel obstruction. There is also serosanguineous fluid that was expressed from the inguinal canal.  The bowel did not appear perforated. There was some hyperemia to it consistent with mild ischemic changes without evidence of necrosis or full-thickness ischemia. Upon exit the piece of small bowel into the abdominal cavity and proceeded with the inguinal hernia repair in anticipation that the small bowel would remain viable. We used electrocautery and scissors to dissect the preperitoneal space creating unilateral peritoneal inguinal flaps. We started on the right side and dissected out the cord structures and completely dissected out the hernia sac reducing it from the inguinal canal. There was a small indirect hernia sac moderate direct pseudosac and this was reduced along with fat which was completely reduced from the inguinal canal. We completely exposed the pubic tubercle and Kevin's ligament. The entire myopectineal orifice was then visualized we could appreciate any laxity or defects in the indirect, direct, femoral and obturator spaces. The myopectineal orifice was visualized in its completeness and associated hernias at the indirect, direct, femoral and obturator spaces were reduced we completely reduced the hernia sac off of the cord structures. We surrounded the cord structures and skeletonized them.  3grams of Willie descicant was placed in the inguinal canal to minimize seroma formation. We then inserted a 15 cm x 10 cm piece of 3D max lightweight Large placed in the inguinal canal and unrolled it. It self adhered to the inguinal canal overlapping Kevin's ligament with at least 2cm overlap. It was completely unfolded and it secured itself in place. I did use absorbable tacks in order to tack the mesh in 2 separate spaces at pubic tubercle and Kevin's ligament and additionally 2 separate tacks were placed medial and lateral to the epigastric vessel. Care was taken not to place tacks lateral and preserve viability of nerve supply to the inguinal canal.  We then closed the preperitoneal space with absorbable tacks to close the peritoneum back up to the abdominal wall. Then reinspected the piece of small bowel running from the terminal ileum although back to ligament of Treitz. This was the obvious transition point in the bowel appeared healthy and viable with fluid coursing through it. At this point I elected to leave the piece of small bowel not resected as it did appear viable without transmural ischemia  We then decompressed the abdomen and removed each one of our trocars. We performed inguinal block with 0.25% Marcaine 10 mL. We then reapproximated each one of the skin incisions using 4-0 Monocryl suture. SurgiGlue was placed over the top. The patient was awoken, extubated and transferred to the postoperative care unit in stable condition. All instrument counts, lap counts, and needle counts were correct at the completion of the procedure.     Katy Pritchett MD, MS  Minimally Invasive and Bariatric Surgery  [phone] (p)  2/18/2023  2:48 PM

## 2023-02-18 NOTE — ED PROVIDER NOTES
HPI   72-year-old female patient present to emergency department for evaluation of nausea and vomiting. Patient has been having nausea and vomiting now for the last 3 days. She is brought in by her daughter who is the primary historian. Patient's daughter reports she has been having dark emesis did not describe it as coffee-ground. Patient also with an elevated blood glucose 230s, she has no history of diabetes. She is denying chest pain but was noted to be hypoxic on room air on arrival 84% placed on nasal cannula oxygen with improvement. Patient having associated fatigue. Review of Systems   Constitutional:  Negative for chills and fever. HENT:  Negative for congestion. Respiratory:  Negative for cough and shortness of breath. Cardiovascular:  Negative for chest pain. Gastrointestinal:  Positive for abdominal pain, nausea and vomiting. Negative for diarrhea. Genitourinary:  Negative for difficulty urinating, dysuria and hematuria. Musculoskeletal:  Negative for back pain. Skin:  Negative for color change. All other systems reviewed and are negative. Physical Exam  Vitals and nursing note reviewed. Constitutional:       Appearance: Normal appearance. HENT:      Head: Normocephalic and atraumatic. Nose: Nose normal. No congestion. Mouth/Throat:      Mouth: Mucous membranes are moist.      Pharynx: Oropharynx is clear. Eyes:      Conjunctiva/sclera: Conjunctivae normal.      Pupils: Pupils are equal, round, and reactive to light. Cardiovascular:      Rate and Rhythm: Normal rate and regular rhythm. Pulses: Normal pulses. Heart sounds: Normal heart sounds. Pulmonary:      Breath sounds: Normal breath sounds. Comments: Patient is tachypneic  Abdominal:      General: Bowel sounds are normal.      Comments: Mild left-sided abdominal tenderness to palpation   Musculoskeletal:         General: Normal range of motion.       Cervical back: Normal range of motion and neck supple. Skin:     General: Skin is warm and dry. Capillary Refill: Capillary refill takes less than 2 seconds. Neurological:      General: No focal deficit present. Mental Status: She is alert. Procedures     MDM  This a 80-year-old female for evaluation of nausea vomiting, hypoxic on arrival.  Differential diagnosis is broad, pneumonia, ACS, dehydration, MAGALY. Patient had arrived here mildly hypotensive, hypoxic. [facility] Placed on nasal cannula oxygen with improvement. Labs here showing significant abnormalities creatinine is 2.1 BUN is 68 possibly related to dehydration. She does have an acute lactic acidosis of 4.2 and she does have elevated glucose of 236. No history of diabetes. For troponin came back at 167, repeat troponin pending at time of admission she is not having any chest pain or shortness of breath and her EKG showed paced rhythm no acute STEMI noted. She did receive IV fluids with subsequent significant improvement in blood pressure about 125/50. She does have slight anion gap of 18 but beta hydroxybutyrate is 2.24 and venous pH is 7.53 so patient is not in DKA. CT scan of her chest showed left-sided pneumonia CT abdomen pelvis shows small bowel obstruction with right inguinal hernia. Patient's abdomen was reassessed multiple times here she is nontender, nonperitoneal, no obvious masses noted. With patient having nausea and vomiting it is possible that she may have aspirated this the cause of her pneumonia so we gave her broad-spectrum antibiotic coverage with Zosyn and vancomycin. Blood and urine cultures pending at time of admission as well. General surgery recommended NG placement. I had shared decision-making with patient's daughter who is at bedside and explained today's findings and the need for NG tube, she is agreeable for this. Please review ED course for the rest of the MDM.     Is this patient to be included in the SEP-1 core measure due to severe sepsis or septic shock? Yes SEP-1 CORE MEASURE DATA      Sepsis Criteria   Severe Sepsis Criteria   Septic Shock Criteria       Must meet 2:    []Temp >100.9 F (38.3 C) or < 96.8 F (36 C)  [x]HR > 90  [x]RR > 20  []WBC > 12 or < 4 or 10% bands    AND:    [] Infection Confirmed or Suspected. Must meet 1:    [x]Lactate > 2       or   [x]Signs of Organ Dysfunction:    - SBP < 90 or MAP < 65  -Creatinine > 2 or increased from baseline  -Urine Output < 0.5 ml/kg/hr  -Bilirubin > 2  -INR > 1.5 (not anticoagulated)  -Platelets < 260,632  -Acute Respiratory Failure as evidenced by new need for NIPPV or mechanical ventilation   Must meet 1:    [x]Lactate > 4        or   []SBP < 90 or MAP < 65 for at least two readings in the first hour after fluid bolus administration    []Vasopressors initiated (if hypotension persists after fluid resuscitation)   Patient Vitals for the past 6 hrs:   BP Temp Pulse Resp SpO2   02/17/23 2039 (!) 87/45 -- 70 18 91 %   02/17/23 2051 -- 97.4 °F (36.3 °C) -- -- --   02/18/23 0025 -- -- 70 30 93 %   02/18/23 0053 (!) 125/50 -- 71 28 93 %      Recent Labs     02/17/23 2201   WBC 10.6   CREATININE 2.1*   BILITOT 0.8   INR 1.3           Septic shock identified date: 2/17 time: 2039    Fluid Resuscitation Rationale: at least 30mL/kg based on entered actual weight at time of triage    Repeat lactate level: ordered and pending at this time    Reassessment Exam: I have reassessed tissue perfusion and hemodynamic status after fluid bolus at this date/time: 0030, 2/18/2023      ED Course as of 02/18/23 0136   Fri Feb 17, 2023   2210 Left Lower lobe pneumonia as interpreted by me on chest x-ray. [FG]   2763 External chart review performed by me on 11/15/2016 patient had left cataract surgery [FG]   2338 EKG showing paced rhythm 70 bpm, no acute changes. Similar compared to prior. Interpretation performed by me.  [FG]   Sat Feb 18, 2023   4804 Patient was reassessed by me she is in no acute distress, no tenderness to the abdomen to deep palpation anywhere, no abdominal masses, no guarding, no rebound patient's abdomen is nonperitoneal. [FG]   0109 Spoke with Dr. Arash Kang internal medicine accepted for admission [FG]   0110 Spoke with Dr. Ann Marie Gomez general surgery recommended NG tube [FG]      ED Course User Index  [FG] Preeti Man DO       --------------------------------------------- PAST HISTORY ---------------------------------------------  Past Medical History:  has a past medical history of GERD (gastroesophageal reflux disease), Hyperlipidemia, Hypertension, and Thyroid disease. Past Surgical History:  has a past surgical history that includes Pacemaker insertion; Hysterectomy; pacemaker placement (1995); Appendectomy; other surgical history (AUGUST 2012); Cataract removal with implant (Right, 4 15 14); and Cataract removal with implant (Left, 11/15/2016). Social History:  reports that she has never smoked. She has never used smokeless tobacco. She reports current alcohol use of about 1.0 standard drink per week. She reports that she does not use drugs. Family History: family history is not on file. The patients home medications have been reviewed. Allergies: Patient has no known allergies.     -------------------------------------------------- RESULTS -------------------------------------------------    Lab  Results for orders placed or performed during the hospital encounter of 02/17/23   COVID-19, Rapid    Specimen: Nasopharyngeal Swab   Result Value Ref Range    SARS-CoV-2, NAAT Not Detected Not Detected   Rapid influenza A/B antigens    Specimen: Nasopharyngeal   Result Value Ref Range    Influenza A by PCR Not Detected Not Detected    Influenza B by PCR Not Detected Not Detected   CBC with Auto Differential   Result Value Ref Range    WBC 10.6 4.5 - 11.5 E9/L    RBC 4.35 3.50 - 5.50 E12/L    Hemoglobin 14.6 11.5 - 15.5 g/dL    Hematocrit 43.7 34.0 - 48.0 %    .5 (H) 80.0 - 99.9 fL    MCH 33.6 26.0 - 35.0 pg    MCHC 33.4 32.0 - 34.5 %    RDW 13.0 11.5 - 15.0 fL    Platelets 780 335 - 484 E9/L    MPV 13.1 (H) 7.0 - 12.0 fL    Neutrophils % 92.0 (H) 43.0 - 80.0 %    Lymphocytes % 5.0 (L) 20.0 - 42.0 %    Monocytes % 2.0 2.0 - 12.0 %    Eosinophils % 0.0 0.0 - 6.0 %    Basophils % 0.0 0.0 - 2.0 %    Neutrophils Absolute 9.86 (H) 1.80 - 7.30 E9/L    Lymphocytes Absolute 0.53 (L) 1.50 - 4.00 E9/L    Monocytes Absolute 0.21 0.10 - 0.95 E9/L    Eosinophils Absolute 0.00 (L) 0.05 - 0.50 E9/L    Basophils Absolute 0.00 0.00 - 0.20 E9/L    Metamyelocytes Relative 1.0 0.0 - 1.0 %    Polychromasia 1+     Poikilocytes 1+     [place] Cells 1+     Ovalocytes 1+    Comprehensive Metabolic Panel w/ Reflex to MG   Result Value Ref Range    Sodium 131 (L) 132 - 146 mmol/L    Potassium reflex Magnesium 4.6 3.5 - 5.0 mmol/L    Chloride 85 (L) 98 - 107 mmol/L    CO2 28 22 - 29 mmol/L    Anion Gap 18 (H) 7 - 16 mmol/L    Glucose 236 (H) 74 - 99 mg/dL    BUN 68 (H) 6 - 23 mg/dL    Creatinine 2.1 (H) 0.5 - 1.0 mg/dL    Est, Glom Filt Rate 21 >=60 mL/min/1.73    Calcium 9.2 8.6 - 10.2 mg/dL    Total Protein 5.5 (L) 6.4 - 8.3 g/dL    Albumin 3.2 (L) 3.5 - 5.2 g/dL    Total Bilirubin 0.8 0.0 - 1.2 mg/dL    Alkaline Phosphatase 41 35 - 104 U/L    ALT 10 0 - 32 U/L    AST 22 0 - 31 U/L   Troponin   Result Value Ref Range    Troponin, High Sensitivity 167 (H) 0 - 9 ng/L   Lactate, Sepsis   Result Value Ref Range    Lactic Acid, Sepsis 4.2 (HH) 0.5 - 1.9 mmol/L   Urinalysis with Microscopic   Result Value Ref Range    Color, UA Yellow Straw/Yellow    Clarity, UA SLCLOUDY Clear    Glucose, Ur Negative Negative mg/dL    Bilirubin Urine Negative Negative    Ketones, Urine Negative Negative mg/dL    Specific Gravity, UA >=1.030 1.005 - 1.030    Blood, Urine Negative Negative    pH, UA 5.0 5.0 - 9.0    Protein, UA TRACE Negative mg/dL    Urobilinogen, Urine 0.2 <2.0 E.U./dL    Nitrite, Urine Negative Negative Leukocyte Esterase, Urine MODERATE (A) Negative   Beta-Hydroxybutyrate   Result Value Ref Range    Beta-Hydroxybutyrate 2.24 (H) 0.02 - 0.27 mmol/L   PH, VENOUS   Result Value Ref Range    pH, Anthony 7.53 (H) 7.35 - 7.45   APTT   Result Value Ref Range    aPTT 29.1 24.5 - 35.1 sec   Protime-INR   Result Value Ref Range    Protime 15.3 (H) 9.3 - 12.4 sec    INR 1.3    EKG 12 Lead   Result Value Ref Range    Ventricular Rate 70 BPM    Atrial Rate 79 BPM    QRS Duration 150 ms    Q-T Interval 472 ms    QTc Calculation (Bazett) 509 ms    R Axis -80 degrees    T Axis 82 degrees       Radiology  CT CHEST WO CONTRAST   Final Result   Ill-defined opacities left lower and upper lobes are consistent with   infiltrates, and to a lesser degree is not excluded in the right lower lobe. CT ABDOMEN PELVIS WO CONTRAST Additional Contrast? None   Final Result   Small-bowel obstruction due to a right inguinal hernia. XR CHEST PORTABLE   Final Result   Left lower lung alveolar infiltrate consistent with pneumonia. Small bilateral pleural effusions. XR ABDOMEN FOR NG/OG/NE TUBE PLACEMENT    (Results Pending)     ------------------------- NURSING NOTES AND VITALS REVIEWED ---------------------------  Date / Time Roomed:  2/17/2023  8:36 PM  ED Bed Assignment:  04/04    The nursing notes within the ED encounter and vital signs as below have been reviewed.    Patient Vitals for the past 24 hrs:   BP Temp Temp src Pulse Resp SpO2   02/18/23 0053 (!) 125/50 -- -- 71 28 93 %   02/18/23 0025 -- -- -- 70 30 93 %   02/17/23 2051 -- 97.4 °F (36.3 °C) Axillary -- -- --   02/17/23 2039 (!) 87/45 -- -- 70 18 91 %       Oxygen Saturation Interpretation: Normal      ------------------------------------------ PROGRESS NOTES ------------------------------------------    I have spoken with the patient and discussed todays results, in addition to providing specific details for the plan of care and counseling regarding the diagnosis and prognosis. Their questions are answered at this time and they are agreeable with the plan. This patient's ED course included: a personal history and physicial examination, re-evaluation prior to disposition, multiple bedside re-evaluations, IV medications, cardiac monitoring, continuous pulse oximetry, and complex medical decision making and emergency management    This patient has remained hemodynamically stable during their ED course. Please note that the withdrawal or failure to initiate urgent interventions for this patient would likely result in a life threatening deterioration or permanent disability. Accordingly this patient received 30 minutes of critical care time, excluding separately billable procedures. Clinical Impression  1. Pneumonia of left lung due to infectious organism, unspecified part of lung    2. MAGALY (acute kidney injury) ([address]    3. Elevated lactic acid level    4. Hyperglycemia    5. SBO (small bowel obstruction) ([address]    6. Acute respiratory failure with hypoxia (HCC)          Disposition  Patient's disposition: Admit to telemetry  Patient's condition is stable.        Cipriano Tam DO  Resident  02/18/23 [id number]

## 2023-02-18 NOTE — INTERVAL H&P NOTE
Update History & Physical    The patient's History and Physical of February 18, It was reviewed with the patient and I examined the patient. There was no change. The surgical site was confirmed by the patient and me. Proceed for dx lap poss open poss bowel resection, repair right inguinal hernia poss mesh. Discussed prolonged ventilation and ICU stay due to prexisting lung pathology and advanced age. Family and patient agree. Plan: The risks, benefits, expected outcome, and alternative to the recommended procedure have been discussed with the patient. Patient understands and wants to proceed with the procedure.      Electronically signed by Sundar Joshi MD on 2/18/2023 at 1:57 PM

## 2023-02-18 NOTE — PROGRESS NOTES
CRITICAL CARE NOTE     The patient's case was discussed in multidisciplinary rounds including critical care specialist, nursing, RT and pharmacy. Evaluation is as follows: This is a 81 yo F with pacemaker, hx reported oonly has HTN/HPL and hypothyroid, now admitted sp N/V, found to have incarcerated hernia. Pt taken to OR and found to have incarcerated small strangulated Rt inguinanl hernia which was extracted and mesh implanted via laproscopic procedure by Dr. Dayana Garcia. Pt tolerated the surgery well and was extubated without difficulty. Lab eval sig for lactate 4.2 --> 1.7, ProCal elevated, WBC 12, Scr 2.1 -->1.8. CT chest with b/l opacities c/w aspiration. She is currently awake and alert, responding well to questions and has no acute distress. Sats >95% on simple facemask. ROS negative other than abd pain.     OBJECTIVE:      Intake/Output Summary (Last 24 hours) at 2/18/2023 1520  Last data filed at 2/18/2023 1451  Gross per 24 hour   Intake 1200 ml   Output 400 ml   Net 800 ml       PHYSICAL EXAM:  BP (!) 138/50   Pulse 70   Temp 98.1 °F (36.7 °C)   Resp 23   Ht 5' 5\" (1.651 m)   Wt 115 lb (52.2 kg)   SpO2 97%   BMI 19.14 kg/m²   Access: perph  O2:simple facemask  Gen: Awake and alert  CV: RRR S1 S2 no m/g/r  Resp: Slight rales bl  Abd: soft NT ND  Extr: 1+ LE edema  Neuro: non focal    MEDICATIONS:   pantoprazole  40 mg IntraVENous Daily    arformoterol tartrate  15 mcg Nebulization BID    budesonide  0.5 mg Nebulization BID    insulin lispro  0-4 Units SubCUTAneous Q6H    metoprolol tartrate  12.5 mg Oral BID    ipratropium-albuterol  1 ampule Inhalation Q4H    piperacillin-tazobactam  3,375 mg IntraVENous Q12H    heparin (porcine)  5,000 Units SubCUTAneous 3 times per day    furosemide  40 mg IntraVENous Once      lactated ringers IV soln Stopped (02/18/23 1359)    dextrose       glucose, dextrose bolus **OR** dextrose bolus, glucagon (rDNA), dextrose      LABS:  CBC:   Lab Results   Component Value Date/Time    WBC 12.2 02/18/2023 07:32 AM    RBC 4.04 02/18/2023 07:32 AM    HGB 13.2 02/18/2023 07:32 AM    HCT 41.2 02/18/2023 07:32 AM    .0 02/18/2023 07:32 AM    MCH 32.7 02/18/2023 07:32 AM    MCHC 32.0 02/18/2023 07:32 AM    RDW 13.2 02/18/2023 07:32 AM     02/18/2023 07:32 AM    MPV 12.6 02/18/2023 07:32 AM     CMP:    Lab Results   Component Value Date/Time     02/18/2023 07:32 AM    K 4.2 02/18/2023 07:32 AM    K 4.6 02/17/2023 10:01 PM    CL 93 02/18/2023 07:32 AM    CO2 26 02/18/2023 07:32 AM    BUN 66 02/18/2023 07:32 AM    CREATININE 1.8 02/18/2023 07:32 AM    GFRAA >60 09/27/2018 02:01 PM    LABGLOM 26 02/18/2023 07:32 AM    GLUCOSE 195 02/18/2023 07:32 AM    PROT 5.2 02/18/2023 07:32 AM    LABALBU 2.6 02/18/2023 07:32 AM    CALCIUM 8.3 02/18/2023 07:32 AM    BILITOT 0.7 02/18/2023 07:32 AM    ALKPHOS 39 02/18/2023 07:32 AM    AST 22 02/18/2023 07:32 AM    ALT 9 02/18/2023 07:32 AM     Magnesium:    Lab Results   Component Value Date/Time    MG 1.9 02/18/2023 07:32 AM     Phosphorus:    Lab Results   Component Value Date/Time    PHOS 5.0 02/18/2023 07:32 AM     LDH:  No results found for: LDH  PT/INR:    Lab Results   Component Value Date/Time    PROTIME 15.3 02/17/2023 10:01 PM    INR 1.3 02/17/2023 10:01 PM     Troponin:  No results found for: TROPONINI  ABG:  No results found for: PH, PCO2, PO2, HCO3, BE, THGB, TCO2, O2SAT  HgBA1c:    Lab Results   Component Value Date/Time    LABA1C 9.5 02/18/2023 07:32 AM         RADIOLOGY:  CT ABDOMEN PELVIS WO CONTRAST Additional Contrast? None   Final Result   Small bowel obstruction secondary to a right inguinal hernia. The degree of   small bowel dilation is stable to slightly increased compared with CT from   yesterday. No evidence of bowel ischemia at this time. Small bilateral pleural effusions with bibasilar atelectasis and somewhat   consolidative airspace opacities in the lingula.   Lingular opacities may be   secondary to atelectasis, pneumonia and or aspiration. XR ABDOMEN FOR NG/OG/NE TUBE PLACEMENT   Final Result   Gastric tube in good position. RECOMMENDATION:   Careful clinical correlation and follow up recommended. CT CHEST WO CONTRAST   Final Result   Ill-defined opacities left lower and upper lobes are consistent with   infiltrates, and to a lesser degree is not excluded in the right lower lobe. CT ABDOMEN PELVIS WO CONTRAST Additional Contrast? None   Final Result   Small-bowel obstruction due to a right inguinal hernia. XR CHEST PORTABLE   Final Result   Left lower lung alveolar infiltrate consistent with pneumonia. Small bilateral pleural effusions. PROBLEM LIST:  Principal Problem:    SBO (small bowel obstruction) (HCC)  Active Problems:    Pneumonia of left lung due to infectious organism  Resolved Problems:    * No resolved hospital problems. *      ASSESSMENT/PLAN:  Monitor resp status closely  Diurese, patient sounds overloaded and has LE edema  Bs ab   Watch renal function closely   Post op care per surgery, appreciate their input    If pt appears stable from resp standpoint can be managed on RNF. DVT Proph, Feeding per surgery, PT/OT/OOB    ATTESTATION:  ICU Staff Physician note of personal involvement in Care  As the attending physician, I certify that I personally reviewed the patients history and personnally examined the patient to confirm the physical findings described above,  And that I reviewed the relevant imaging studies and available reports. I also discussed the differential diagnosis and all of the proposed management plans with the patient and individuals accompanying the patient to this visit. They had the opportunity to ask questions about the proposed management plans and to have those questions answered.      This patient has a high probability of sudden, clinically significant deterioration, which requires the highest level of physician preparedness to intervene urgently. I managed/supervised life or organ supporting interventions that required frequent physician assessment. I devoted my full attention to the direct care of this patient for the amount of time indicated below. Time I spent with the family or surrogate(s) is included only if the patient was incapable of providing the necessary information or participating in medical decisions - Time devoted to teaching and to any procedures I billed separately is not included.     CRITICAL CARE TIME:  32 min    Ángela Pope MD  Pulmonary and Critical Care Medicine  02/18/23

## 2023-02-18 NOTE — CONSULTS
Priya Parks MD, MS    Patient's Name/Date of Birth: Vandana Nuñez / 3/5/1929    Date: February 18, 2023     Consulting Surgeon: Angel Mar MD    PCP: Eloina Quinteros MD     Chief Complaint: SBO    HPI:   Vandana Nuñez is a 80 y.o. female who presents for evaluation of SBO. Timing is constant, radiation to none, alleviated by none and started unk and severity is 10/10. Presented with hypoxia and nausea, vomiting. Suspected aspiration due to SBO. CT with right inguinal hernia. She denies pain now, NG in place with dark drainage. On eloquis      Patient Active Problem List   Diagnosis    Substernal thyroid    Right cataract    Left cataract    SBO (small bowel obstruction) ([address]       No Known Allergies    Past Medical History:   Diagnosis Date    GERD (gastroesophageal reflux disease)     Hyperlipidemia     Hypertension     Thyroid disease        Past Surgical History:   Procedure Laterality Date    APPENDECTOMY      CATARACT EXTRACTION W/  INTRAOCULAR LENS IMPLANT Right 4 15 14    CATARACT EXTRACTION W/  INTRAOCULAR LENS IMPLANT Left 11/15/2016    HYSTERECTOMY      OTHER SURGICAL HISTORY  AUGUST 2012    RIGHT HEMITHYROIDECTOMY    PACEMAKER INSERTION      PACEMAKER PLACEMENT  1995    [facility]        Social History     Socioeconomic History    Marital status:      Spouse name: Not on file    Number of children: Not on file    Years of education: Not on file    Highest education level: Not on file   Occupational History    Not on file   Tobacco Use    Smoking status: Never    Smokeless tobacco: Never   Substance and Sexual Activity    Alcohol use:  Yes     Alcohol/week: 1.0 standard drink     Types: 1 Glasses of wine per week     Comment: rarely    Drug use: No    Sexual activity: Not on file   Other Topics Concern    Not on file   Social History Narrative    Not on file     Social Determinants of Health     Financial Resource Strain: Not on file   Food Insecurity: Not on file   Transportation Needs: Not on file   Physical Activity: Not on file   Stress: Not on file   Social Connections: Not on file   Intimate Partner Violence: Not on file   Housing Stability: Not on file       The patient has a family history that is negative for severe cardiovascular or respiratory issues, negative for reaction to anesthesia. Specifically there is no positive findings of gastrointestinal cancer in mother or father to the patient's knowledge    Recent Labs     02/17/23 2201 02/18/23  0732   WBC 10.6 12.2*   HGB 14.6 13.2   HCT 43.7 41.2   .5* 102.0*    125*       Recent Labs     02/17/23 2201 02/18/23  0732   * 137   K 4.6 4.2   CO2 28 26   PHOS  --  5.0*   BUN 68* 66*   CREATININE 2.1* 1.8*       Recent Labs     02/17/23 2201 02/18/23  0732   PROT 5.5* 5.2*   INR 1.3  --        No intake or output data in the 24 hours ending 02/18/23 0849    I have reviewed relevant labs from this admission and interpretation is included in my assessment and plan      Review of Systems  A complete 10 system review was performed and are otherwise negative unless mentioned in the above HPI. Specific negatives are listed below but may not include all those reviewed.     General ROS: negative obtundation, AMS  ENT ROS: negative rhinorrhea, epistaxis  Allergy and Immunology ROS: negative itchy/watery eyes or nasal congestion  Hematological and Lymphatic ROS: negative spontaneous bleeding or bruising  Endocrine ROS: negative  lethargy, mood swings, palpitations or polydipsia/polyuria  Respiratory ROS: negative sputum changes, stridor, tachypnea or wheezing  Cardiovascular ROS: negative for - loss of consciousness, murmur or orthopnea  Gastrointestinal ROS: negative for - hematochezia or hematemesis  Genito-Urinary ROS: negative for -  genital discharge or hematuria  Musculoskeletal ROS: negative for - focal weakness, gangrene  Psych/Neuro ROS: negative for - visual or auditory hallucinations, suicidal ideation      Physical exam:   BP (!) 128/50   Pulse 70   Temp 98 °F (36.7 °C) (Oral)   Resp 20   Ht 5' 5\" (1.651 m)   Wt 115 lb (52.2 kg)   SpO2 94%   BMI 19.14 kg/m²   General appearance:  NAD, appears stated age  Head: NCAT, PERRLA, EOMI, red conjunctiva  Neck: supple, no masses, trachea midline  Lungs: Equal chest rise bilateral, no retractions, no wheezing  Heart: Reg rate  Abdomen: soft, distended, nontende  Skin; warm and dry, no cyanosis  Gu: no cva tenderness, no identifiable or reducible hernia in right groin, may be subtle or reduced spontaneously  Extremities: atraumatic, no focal motor deficits, no open wounds  Psych: No tremor, visual hallucinations        Radiology: I reviewed relevant abdominal imaging from this admission and that available in the EMR including CT abd/pel from admission. My assessment is right inguinal hernia incarcerated, SBo    Assessment:  Porter Aragon is a 80 y.o. female with incarcerated right inguinal hernia, SBO    Patient Active Problem List   Diagnosis    Substernal thyroid    Right cataract    Left cataract    SBO (small bowel obstruction) ([facility]       Plan:  NPO, NG tube  Repeat CT STAT  I cannot feel the hernia so may have reduced  If not reduced will go to Surgery to repair- increased bleeding risk on eloquis but unclear if she has tolerated it the last 4 days  Discussed with family bedside  High risk for postop vent support due to aspiration and pneumonia    Time spent reviewing past medical, surgical, social and family history, vitals, nursing assessment and images. Time spent face to face with patient and family counciling and discussing care exceeded 50% of the time of the consult. Additional time spent reviewing images and labs, discussing case with nursing, support staff and other physicians; as well as coordinating care.         NOTE: This report, in part or full, may have been transcribed using voice recognition software. Every effort was made to ensure accuracy; however, inadvertent computerized transcription errors may be present. Please excuse any transcriptional grammatical or spelling errors that may have escaped my editorial review.       Physician Signature: Electronically signed by Dr. Ann Marie Gomez  [phone] (p)

## 2023-02-18 NOTE — ED NOTES
Right NGT re-secured with tape, continues drain green bile, small amount collection container.       Manan Martin RN  02/18/23 6729

## 2023-02-18 NOTE — ANESTHESIA POSTPROCEDURE EVALUATION
Department of Anesthesiology  Postprocedure Note    Patient: Mariana Jackson  MRN: [id number]  YOB: 1929  Date of evaluation: 2/18/2023      Procedure Summary     Date: 02/18/23 Room / Location: [address] [place] [address] / [address] [place] [address]    Anesthesia Start: 8043 Anesthesia Stop: 7838    Procedure: LAPAROSCOPIC INCARCERATED STRANGULATED RIGHT INGUINAL HERNIA REPAIR WITH MESH (Abdomen) Diagnosis:       Small bowel obstruction ([address]      (SMALL BOWEL OBSTRUCTION)    Surgeons: Osmin Blanco MD Responsible Provider: Clarisse Gordon MD    Anesthesia Type: general ASA Status: 4 - Emergent          Anesthesia Type: No value filed.     Fredo Phase I: Fredo Score: 8    Fredo Phase II: Fredo Score: 9      Anesthesia Post Evaluation    Patient location during evaluation: ICU  Patient participation: complete - patient participated  Level of consciousness: awake and alert  Pain score: 3  Airway patency: patent  Nausea & Vomiting: no nausea  Complications: no  Cardiovascular status: blood pressure returned to baseline  Respiratory status: acceptable and face mask  Hydration status: euvolemic

## 2023-02-18 NOTE — PROGRESS NOTES
Pharmacy Note - Renal Dosing    Piperacillin/Tazobactam 3375mg Q8h for treatment of Hospital acquired pneumonia. Per [address] Renal Dose Adjustment Policy, piperacillin/tazobactam will be changed to 3375mg Q12h extended infusion        Please call with any questions.     Thank you,    Mariana Lau, [address] [facility]

## 2023-02-18 NOTE — CONSULTS
Today's Date: 2/18/2023  Patient Name: Sudeep Hewitt  Date of admission: 2/17/2023  8:36 PM  Patient's age: 80 y.o., 3/5/1929female    Admission Dx: [facility] [K56.609]  Hyperglycemia [R73.9]  MAGALY (acute kidney injury) ([facility] [N17.9]  Acute respiratory failure with hypoxia (HCC) [J96.01]  Elevated lactic acid level [R79.89]  Pneumonia of left lung due to infectious organism, unspecified part of lung [J18.9]    Requesting Physician: Dm Jim DO    Chief Complaint   Patient presents with    Emesis     [place] and vomiting x 3 days; dark brown vomit per family after trying chicken broth today; denies fever or chills; no diarrhea; went to doctors yesterday and sent home with dx of stomach bug  Not diabetic,        HISTORY OF PRESENT ILLNESS:      80year-old pleasant lady who sees Dr. Juan Frias for her cardiac problems and I am covering for him today, was admitted to the hospital secondary to new onset nausea, vomiting, abdominal pain, and small bowel obstruction. She had an NG tube placed to suctioning for decompression. She has been nothing by mouth. She has history of permanent atrial fibrillation with a permanent pacemaker in place. She denied any chest pain. She denied any shortness of breath. Her creatinine is elevated. Her troponin is minimally elevated without to changes. REVIEW OF SYSTEMS:  A comprehensive 14 point review of systems was negative except for: what is in the HPI       Past Medical History:   has a past medical history of GERD (gastroesophageal reflux disease), Hyperlipidemia, Hypertension, and Thyroid disease. Past Surgical History:   has a past surgical history that includes Pacemaker insertion; Hysterectomy; pacemaker placement (1995); Appendectomy; other surgical history (AUGUST 2012); Cataract removal with implant (Right, 4 15 14); and Cataract removal with implant (Left, 11/15/2016). Social History:   reports that she has never smoked. She has never used smokeless tobacco. She reports current alcohol use of about 1.0 standard drink per week. She reports that she does not use drugs. Family History: No for premature CAD . family history is not on file. .     Allergies:  Patient has no known allergies. MEDICATIONS:   pantoprazole  40 mg IntraVENous Daily    arformoterol tartrate  15 mcg Nebulization BID    budesonide  0.5 mg Nebulization BID    insulin lispro  0-4 Units SubCUTAneous Q6H    metoprolol tartrate  12.5 mg Oral BID    ipratropium-albuterol  1 ampule Inhalation Q4H    piperacillin-tazobactam  3,375 mg IntraVENous Q12H    heparin (porcine)  5,000 Units SubCUTAneous 3 times per day    sodium chloride flush  5-40 mL IntraVENous BID       OUTPT MEDS  Current Discharge Medication List        CONTINUE these medications which have NOT CHANGED    Details   cefdinir (OMNICEF) 300 MG capsule Take 300 mg by mouth 2 times daily      mesalamine (LIALDA) 1.2 g EC tablet Take 1,200 mg by mouth daily (with breakfast)      Melatonin 1 MG CAPS Take 5 mg by mouth nightly      ELIQUIS 2.5 MG TABS tablet TAKE ONE TABLET BY MOUTH EVERY 12 HOURS      prednisoLONE 5 MG TABS Take 10 mg by mouth daily      lisinopril (PRINIVIL;ZESTRIL) 10 MG tablet Take 10 mg by mouth daily. hydrochlorothiazide (HYDRODIURIL) 25 MG tablet Take 25 mg by mouth daily. simvastatin (ZOCOR) 40 MG tablet Take 40 mg by mouth nightly.        magnesium oxide (MAG-OX) 400 MG tablet Take 400 mg by mouth daily. metoprolol (LOPRESSOR) 50 MG tablet Take 50 mg by mouth daily. PHYSICAL EXAM:      BP (!) 149/49   Pulse 70   Temp 98.1 °F (36.7 °C)   Resp 23   Ht 5' 5\" (1.651 m)   Wt 115 lb (52.2 kg)   SpO2 97%   BMI 19.14 kg/m²      General: Alert and oriented, No acute distress. Appears as stated age. NG tube to suction. Eye:  Pupils are equal, round and reactive to light, Extraocular movements are intact, Normal conjunctiva.     Head: Normocephalic, Normal hearing, Oral mucosa is moist.  Neck: Supple, No carotid bruit, No jugular venous distention, No lymphadenopathy.  Respiratory: Lungs are clear to auscultation, Respirations are non-labored, Breath sounds are equal, Symmetrical chest wall expansion.  Cardiovascular: Normal rate, No murmur, No gallop, Good pulses equal in all extremities, No edema.  Gastrointestinal: Soft, Non-tender, mildly distended, diminished bowel sounds.  Musculoskeletal: Normal strength, No tenderness, No deformity.  Integumentary:  Warm, Dry.    Neurologic: Alert, Oriented, No focal deficits.  Cognition and Speech: Oriented, Speech clear and coherent.  Psychiatric: Cooperative, Appropriate mood & affect, Normal judgment.    ECG: Atrial fibrillation with ventricular pacing.      CBC:   Recent Labs     02/17/23 2201 02/18/23  0732   WBC 10.6 12.2*   HGB 14.6 13.2   HCT 43.7 41.2    125*     BMP:   Recent Labs     02/17/23 2201 02/18/23  0732   * 137   K 4.6 4.2   CO2 28 26   BUN 68* 66*   CREATININE 2.1* 1.8*   LABGLOM 21 26   GLUCOSE 236* 195*     BNP: No results for input(s): BNP in the last 72 hours.  PT/INR:   Recent Labs     02/17/23 2201   PROTIME 15.3*   INR 1.3     APTT:  Recent Labs     02/17/23 2201   APTT 29.1     CARDIAC ENZYMES:No results for input(s): CKTOTAL, CKMB, CKMBINDEX, TROPONINI in the last 72 hours.  FASTING LIPID PANEL:  Lab Results   Component Value Date/Time    HDL 38 02/18/2023 07:32 AM    LDLCALC 23 02/18/2023 07:32 AM    TRIG 134 02/18/2023 07:32 AM     LIVER PROFILE:  Recent Labs     02/17/23 2201 02/18/23  0732   AST 22 22   ALT 10 9   LABALBU 3.2* 2.6*       Radiology:  CT ABDOMEN PELVIS WO CONTRAST Additional Contrast? None    Result Date: 2/18/2023  EXAMINATION: CT OF THE ABDOMEN AND PELVIS WITHOUT CONTRAST 2/18/2023 9:27 am TECHNIQUE: CT of the abdomen and pelvis was performed without the administration of intravenous contrast. Multiplanar reformatted images are provided  for review. Automated exposure control, iterative reconstruction, and/or weight based adjustment of the mA/kV was utilized to reduce the radiation dose to as low as reasonably achievable. COMPARISON: February 17, 2023 HISTORY: ORDERING SYSTEM PROVIDED HISTORY: eval reducition of hernia, SBO TECHNOLOGIST PROVIDED HISTORY: Reason for exam:->eval reducition of hernia, SBO Additional Contrast?->None FINDINGS: Lower chest: The heart is top-normal in size. Coronary artery calcifications are noted, potentially a marker for coronary artery disease. Pacer/AICD leads are partially imaged. Small bilateral pleural effusions with bibasilar compressive and dependent atelectasis is stable in appearance. There are also consolidative airspace opacities in the lingula, partially imaged and potentially related to aspiration/pneumonia. An enteric catheter is noted within the partially decompressed esophagus. Organs: The gallbladder is decompressed with multiple gallstones. The unenhanced liver and spleen are normal in appearance. The adrenal glands are not enlarged. The pancreas demonstrates diffuse fatty atrophy. Please note that evaluation of the pancreas (and other solid organs) is limited due to lack of IV contrast.  No nephrolithiasis or hydronephrosis. GI/Bowel: There is persistent, unchanged to slightly increased dilation of numerous small bowel loops throughout the abdomen and pelvis. A right inguinal hernia containing nonobstructed small bowel loop is again noted and reflects a focal transition point leading to small bowel obstruction. Mild-to-moderate, diffuse mesenteric edema is associated with small bowel dilation. There is no evidence of pneumatosis or free intraperitoneal air at this time. Portions of the colon are decompressed, limiting assessment for mucosal based abnormalities. There is diverticulosis without evidence of diverticulitis. The appendix is not confidently visualized.   An enteric catheter extends below the diaphragm and terminates in the proximal body of the stomach. Pelvis: The urinary bladder is decompressed around a well-positioned Posey catheter. A small volume of free fluid is present in the pelvis, likely reactive. Visible but nonenlarged pelvic lymph nodes are present. Peritoneum/Retroperitoneum: The abdominal aorta is heavily calcified but normal in caliber. No retroperitoneal lymphadenopathy or mass. Bones/Soft Tissues: No acute osseous abnormality or evidence of an aggressive osseous lesion. Bones are diffusely osteopenic, limiting assessment for a nondisplaced fracture. Within this limitation, no convincing evidence of an acute fracture. There are moderate to severe degenerative changes involving the facet joints of the lower lumbar spine. Small bowel obstruction secondary to a right inguinal hernia. The degree of small bowel dilation is stable to slightly increased compared with CT from yesterday. No evidence of bowel ischemia at this time. Small bilateral pleural effusions with bibasilar atelectasis and somewhat consolidative airspace opacities in the lingula. Lingular opacities may be secondary to atelectasis, pneumonia and or aspiration. CT ABDOMEN PELVIS WO CONTRAST Additional Contrast? None    Result Date: 2/17/2023  EXAMINATION: CT OF THE ABDOMEN AND PELVIS WITHOUT CONTRAST 2/17/2023 11:00 pm TECHNIQUE: CT of the abdomen and pelvis was performed without the administration of intravenous contrast. Multiplanar reformatted images are provided for review. Automated exposure control, iterative reconstruction, and/or weight based adjustment of the mA/kV was utilized to reduce the radiation dose to as low as reasonably achievable. COMPARISON: None.  HISTORY: ORDERING SYSTEM PROVIDED HISTORY: Nausea vomiting abdominal pain TECHNOLOGIST PROVIDED HISTORY: Reason for exam:->Nausea vomiting abdominal pain Additional Contrast?->None FINDINGS: Lower Chest:   Evaluated on separate exam Organs: Cholelithiasis. Prominent atrophy of the pancreas. GI/Bowel: Dilated small bowel loops and stomach, into the pelvis, collapsed thereafter and colon as well. The etiology appears to be a right inguinal hernia Pelvis:   Also a left inguinal hernia which contains fat is present. Hysterectomy Peritoneum/Retroperitoneum: Ascites is overall mild most evident in the pelvis. There are severe atherosclerotic calcifications present. Bones/Soft Tissues:   Low bone density. Degenerative changes. Small-bowel obstruction due to a right inguinal hernia. CT CHEST WO CONTRAST    Result Date: 2/17/2023  EXAMINATION: CT OF THE CHEST WITHOUT CONTRAST 2/17/2023 11:00 pm TECHNIQUE: CT of the chest was performed without the administration of intravenous contrast. Multiplanar reformatted images are provided for review. Automated exposure control, iterative reconstruction, and/or weight based adjustment of the mA/kV was utilized to reduce the radiation dose to as low as reasonably achievable. COMPARISON: None. HISTORY: ORDERING SYSTEM PROVIDED HISTORY: Evaluate pneumonia TECHNOLOGIST PROVIDED HISTORY: Reason for exam:->Evaluate pneumonia Decision Support Exception - unselect if not a suspected or confirmed emergency medical condition->Emergency Medical Condition (MA) FINDINGS: Mediastinum: Absent right thyroid lobe. Suggestion of small nodules on the left. No routine follow-up imaging is recommended. Prominent coronary artery calcifications. Mild hiatal hernia. Mild distension of lower esophagus and small hiatal hernia present. To the right of the lower esophagus fluid attenuation present most commonly represents pericardial recess fluid. Evaluation at this time is quite limited by the a artifacts and lack of contrast. Pacemaker present from the left Lungs/pleura:   Small to moderate pleural effusions. Right lower lobe adjacent density is most likely atelectasis but a component of infiltrate is not excluded. Left-side as ill-defined consolidated density in the majority of the left lower lobe and in the adjacent left upper lobe. Etiology is nonspecific but consistent with the history of pneumonia Upper Abdomen: Evaluated on separate exam Soft Tissues/Bones: Bone mineral density appears low. Fused appearance of multiple vertebrae may represent ankylosing spondylitis. Degenerative changes most conspicuous at the shoulders more to the right     Ill-defined opacities left lower and upper lobes are consistent with infiltrates, and to a lesser degree is not excluded in the right lower lobe. XR CHEST PORTABLE    Result Date: 2/17/2023  EXAMINATION: ONE XRAY VIEW OF THE CHEST 2/17/2023 9:18 pm COMPARISON: None. HISTORY: ORDERING SYSTEM PROVIDED HISTORY: eval pna TECHNOLOGIST PROVIDED HISTORY: Reason for exam:->eval pna FINDINGS: Single AP upright portable chest demonstrates blunting of the costophrenic angles consistent with small pleural effusions with patchy alveolar infiltrates occupying the lower 2/3 of the left lung. There is no evidence of a pneumothorax. The soft tissues and osseous structures appear unremarkable. There is a dual-chamber cardiac pacemaker in place with no evidence of a pneumothorax. Left lower lung alveolar infiltrate consistent with pneumonia. Small bilateral pleural effusions. XR ABDOMEN FOR NG/OG/NE TUBE PLACEMENT    Result Date: 2/18/2023  EXAMINATION: ONE SUPINE XRAY VIEW(S) OF THE ABDOMEN 2/18/2023 2:45 am COMPARISON: None. HISTORY: ORDERING SYSTEM PROVIDED HISTORY: Confirmation of course of NG/OG/NE tube and location of tip of tube TECHNOLOGIST PROVIDED HISTORY: Reason for exam:->Confirmation of course of NG/OG/NE tube and location of tip of tube Portable? ->Yes FINDINGS: Gastric tube in good position with proximal side hole beneath the hemidiaphragms. Right greater than left basilar airspace disease noted. Gastric tube in good position.  RECOMMENDATION: Careful clinical correlation and follow up recommended.           Impression:      Small bowel obstruction  Nausea/vomiting  Acute renal insufficiency  Permanent atrial fibrillation  Previous pacemaker.  Hypercholesterolemia          Plan of management:        Continue metoprolol if he is able to take meds.  G-tube.  Her rate is controlled  She is a permanent pacemaker and bradycardia is not of concern.  She is asymptomatic from cardiac standpoint.  2D echo Doppler is ordered.  The Minor elevation of troponin I is not clinically significant at this point since she is not having cardiac symptoms and she has very negative and multiple comorbid conditions.  From cardiac standpoint the patient would be monitored and treated conservatively as long as she is not having acute MI or a major cardiac event.  If echocardiogram showed good LV function she would be considered an average cardiac risk for her age.  Oral anticoagulant is being held.  She is on subcutaneous heparin.  May proceed with surgery if needed one emergency basis.  Dr. Reid will resume her cardiac care on Monday              CC Dr. Romero          **This report was transcribed using voice recognition software. Every effort was made to ensure accuracy; however, inadvertent computerized transcription errors may be present.            Bernardo Romero MD, MD, FACC

## 2023-02-18 NOTE — PROGRESS NOTES
Pharmacist Review and Automatic Dose Adjustment of Prophylactic Enoxaparin    Reviewed reason(s) for admission/hospital problem list    The reviewing pharmacist has made an adjustment to the ordered enoxaparin dose or converted to UFH per the approved [facility] protocol and table as identified below.        Chetna Lundy is a 89 y.o. female.     Recent Labs     02/17/23  2201   CREATININE 2.1*       Estimated Creatinine Clearance: 14 mL/min (A) (based on SCr of 2.1 mg/dL (H)).    Recent Labs     02/17/23  2201   HGB 14.6   HCT 43.7        Recent Labs     02/17/23  2201   INR 1.3       Height:   Ht Readings from Last 1 Encounters:   02/18/23 5' 5\" (1.651 m)     Weight:  Wt Readings from Last 1 Encounters:   02/18/23 115 lb (52.2 kg)               Plan: Based upon the patient's weight and renal function    Ordered: Enoxaparin 30mg SUBQ Daily    Changed/converted to    New Order: Heparin 5,000 units SUBQ TID      Thank you,  Eboni Martins [facility]  2/18/2023, 6:52 AM

## 2023-02-18 NOTE — ANESTHESIA PRE PROCEDURE
Department of Anesthesiology  Preprocedure Note       Name:  Violet Oakley   Age:  80 y.o.  :  3/5/1929                                          MRN:  [id number]         Date:  2023      Surgeon: Inga Warren):  Clarita Benito MD    Procedure: Procedure(s):  LAPAROSCOPIC INCARCERATED STRANGULATED RIGHT INGUINAL HERNIA REPAIR WITH MESH    Medications prior to admission:   Prior to Admission medications    Medication Sig Start Date End Date Taking? Authorizing Provider   cefdinir (OMNICEF) 300 MG capsule Take 300 mg by mouth 2 times daily   Yes Historical Provider, MD   mesalamine (LIALDA) 1.2 g EC tablet Take 1,200 mg by mouth daily (with breakfast)   Yes Historical Provider, MD   Melatonin 1 MG CAPS Take 5 mg by mouth nightly   Yes Historical Provider, MD   ELIQUIS 2.5 MG TABS tablet TAKE ONE TABLET BY MOUTH EVERY 12 HOURS 23   Historical Provider, MD   prednisoLONE 5 MG TABS Take 10 mg by mouth daily    Historical Provider, MD   lisinopril (PRINIVIL;ZESTRIL) 10 MG tablet Take 10 mg by mouth daily. Historical Provider, MD   hydrochlorothiazide (HYDRODIURIL) 25 MG tablet Take 25 mg by mouth daily. Historical Provider, MD   simvastatin (ZOCOR) 40 MG tablet Take 40 mg by mouth nightly. Historical Provider, MD   magnesium oxide (MAG-OX) 400 MG tablet Take 400 mg by mouth daily. Historical Provider, MD   metoprolol (LOPRESSOR) 50 MG tablet Take 50 mg by mouth daily.       Historical Provider, MD       Current medications:    Current Facility-Administered Medications   Medication Dose Route Frequency Provider Last Rate Last Admin    lactated ringers IV soln infusion   IntraVENous Continuous Clarita Benito  mL/hr at 23 1524 Restarted at 23 1524    pantoprazole (PROTONIX) injection 40 mg  40 mg IntraVENous Daily Clarita Benito MD   40 mg at 23 0902    arformoterol tartrate (BROVANA) nebulizer solution 15 mcg  15 mcg Nebulization BID Clarita Benito MD   15 mcg at 02/18/23 0651    budesonide (PULMICORT) nebulizer suspension 500 mcg  0.5 mg Nebulization BID Jose F Celis MD   500 mcg at 02/18/23 9086    glucose chewable tablet 16 g  4 tablet Oral PRN Jose F Celis MD        dextrose bolus 10% 125 mL  125 mL IntraVENous PRN Jose F Celis MD        Or    dextrose bolus 10% 250 mL  250 mL IntraVENous PRN Jose F Celis MD        glucagon (rDNA) injection 1 mg  1 mg SubCUTAneous PRN Jose F Celis MD        dextrose 10 % infusion   IntraVENous Continuous PRN Jose F Celis MD        insulin lispro (HUMALOG) injection vial 0-4 Units  0-4 Units SubCUTAneous Q6H Jose F Celis MD        metoprolol tartrate (LOPRESSOR) tablet 12.5 mg  12.5 mg Oral BID Jose F Celis MD        ipratropium-albuterol (DUONEB) nebulizer solution 1 ampule  1 ampule Inhalation Q4H Jose F Celis MD   1 ampule at 02/18/23 1114    piperacillin-tazobactam (ZOSYN) 3,375 mg in sodium chloride 0.9 % 50 mL IVPB ([id number]) extended infusion  3,375 mg IntraVENous Q12H Jose F Celis MD 12.5 mL/hr at 02/18/23 1536 3,375 mg at 02/18/23 1536    heparin (porcine) injection 5,000 Units  5,000 Units SubCUTAneous 3 times per day Jose F Celis MD   5,000 Units at 02/18/23 0901    sodium chloride flush 0.9 % injection 5-40 mL  5-40 mL IntraVENous PRN Matt Rodriguez DO        sodium chloride flush 0.9 % injection 5-40 mL  5-40 mL IntraVENous BID Matt Rodriguez DO           Allergies:  No Known Allergies    Problem List:    Patient Active Problem List   Diagnosis Code    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    Right cataract H26.9    Left cataract H26.9    SBO (small bowel obstruction) ([facility] [address] K56.609    Pneumonia of left lung due to infectious organism J18.9       Past Medical History:        Diagnosis Date    GERD (gastroesophageal reflux disease)     Hyperlipidemia     Hypertension     Thyroid disease        Past Surgical History:        Procedure Laterality Date    APPENDECTOMY      CATARACT EXTRACTION W/  INTRAOCULAR LENS IMPLANT Right 4 15 14    CATARACT EXTRACTION W/  INTRAOCULAR LENS IMPLANT Left 11/15/2016    HYSTERECTOMY      OTHER SURGICAL HISTORY  AUGUST 2012    RIGHT HEMITHYROIDECTOMY    PACEMAKER INSERTION      PACEMAKER PLACEMENT  1995    [place]        Social History:    Social History     Tobacco Use    Smoking status: Never    Smokeless tobacco: Never   Substance Use Topics    Alcohol use: Yes     Alcohol/week: 1.0 standard drink     Types: 1 Glasses of wine per week     Comment: rarely                                Counseling given: Not Answered      Vital Signs (Current):   Vitals:    02/18/23 1215 02/18/23 1505 02/18/23 1515 02/18/23 1523   BP: (!) 120/53 (!) 134/47 (!) 138/50 (!) 149/49   Pulse: 70 70 70    Resp: 16 24 23    Temp: 98 °F (36.7 °C) 97 °F (36.1 °C) 98.1 °F (36.7 °C)    TempSrc:  Core     SpO2:  100% 97%    Weight:       Height:                                                  BP Readings from Last 3 Encounters:   02/18/23 (!) 149/49   11/15/16 (!) 158/67   04/15/14 151/55       NPO Status:                                                                                 BMI:   Wt Readings from Last 3 Encounters:   02/18/23 115 lb (52.2 kg)   11/15/16 137 lb (62.1 kg)   11/09/16 133 lb (60.3 kg)     Body mass index is 19.14 kg/m².     CBC:   Lab Results   Component Value Date/Time    WBC 12.2 02/18/2023 07:32 AM    RBC 4.04 02/18/2023 07:32 AM    HGB 13.2 02/18/2023 07:32 AM    HCT 41.2 02/18/2023 07:32 AM    .0 02/18/2023 07:32 AM    RDW 13.2 02/18/2023 07:32 AM     02/18/2023 07:32 AM       CMP:   Lab Results   Component Value Date/Time     02/18/2023 07:32 AM    K 4.2 02/18/2023 07:32 AM    K 4.6 02/17/2023 10:01 PM    CL 93 02/18/2023 07:32 AM    CO2 26 02/18/2023 07:32 AM    BUN 66 02/18/2023 07:32 AM    CREATININE 1.8 02/18/2023 07:32 AM    GFRAA >60 09/27/2018 02:01 PM    LABGLOM 26 02/18/2023 07:32 AM GLUCOSE 195 02/18/2023 07:32 AM    PROT 5.2 02/18/2023 07:32 AM    CALCIUM 8.3 02/18/2023 07:32 AM    BILITOT 0.7 02/18/2023 07:32 AM    ALKPHOS 39 02/18/2023 07:32 AM    AST 22 02/18/2023 07:32 AM    ALT 9 02/18/2023 07:32 AM       POC Tests: No results for input(s): POCGLU, POCNA, POCK, POCCL, POCBUN, POCHEMO, POCHCT in the last 72 hours. Coags:   Lab Results   Component Value Date/Time    PROTIME 15.3 02/17/2023 10:01 PM    INR 1.3 02/17/2023 10:01 PM    APTT 29.1 02/17/2023 10:01 PM       HCG (If Applicable): No results found for: PREGTESTUR, PREGSERUM, HCG, HCGQUANT     ABGs: No results found for: PHART, PO2ART, MQS3WBF, SDW4NEF, BEART, T8GGBJCL     Type & Screen (If Applicable):  No results found for: LABABO, LABRH    Drug/Infectious Status (If Applicable):  No results found for: HIV, HEPCAB    COVID-19 Screening (If Applicable):   Lab Results   Component Value Date/Time    COVID19 Not Detected 02/17/2023 10:02 PM           Anesthesia Evaluation  Patient summary reviewed  Airway: Mallampati: II  TM distance: >3 FB   Neck ROM: full  Mouth opening: > = 3 FB   Dental:          Pulmonary: breath sounds clear to auscultation  (+) pneumonia:                             Cardiovascular:    (+) hypertension:, pacemaker:,         Rhythm: regular  Rate: normal                    Neuro/Psych:   Negative Neuro/Psych ROS              GI/Hepatic/Renal:   (+) GERD:,           Endo/Other:    (+) hypothyroidism::., .                 Abdominal:       Abdomen: soft. Vascular: Other Findings:           Anesthesia Plan      general     ASA 4 - emergent     (Discussed with family possible ICU admission invasive monitoring as needed and possible post op ventilation.)  Induction: intravenous. Anesthetic plan and risks discussed with patient. Plan discussed with CRNA.                     Jamilah Murillo MD   2/18/2023

## 2023-02-18 NOTE — H&P (VIEW-ONLY)
Carol Ann Dash MD, MS    Patient's Name/Date of Birth: Brooke Bond / 3/5/1929    Date: February 18, 2023     Consulting Surgeon: Ulla Mortimer, MD    PCP: Jaylin Guzmán MD     Chief Complaint: SBO    HPI:   Brooke Bond is a 80 y.o. female who presents for evaluation of SBO. Timing is constant, radiation to none, alleviated by none and started unk and severity is 10/10. Presented with hypoxia and nausea, vomiting. Suspected aspiration due to SBO. CT with right inguinal hernia. Patient Active Problem List   Diagnosis    Substernal thyroid    Right cataract    Left cataract    SBO (small bowel obstruction) ([address]       No Known Allergies    Past Medical History:   Diagnosis Date    GERD (gastroesophageal reflux disease)     Hyperlipidemia     Hypertension     Thyroid disease        Past Surgical History:   Procedure Laterality Date    APPENDECTOMY      CATARACT EXTRACTION W/  INTRAOCULAR LENS IMPLANT Right 4 15 14    CATARACT EXTRACTION W/  INTRAOCULAR LENS IMPLANT Left 11/15/2016    HYSTERECTOMY      OTHER SURGICAL HISTORY  AUGUST 2012    RIGHT HEMITHYROIDECTOMY    PACEMAKER INSERTION      PACEMAKER PLACEMENT  1995    [facility]        Social History     Socioeconomic History    Marital status:      Spouse name: Not on file    Number of children: Not on file    Years of education: Not on file    Highest education level: Not on file   Occupational History    Not on file   Tobacco Use    Smoking status: Never    Smokeless tobacco: Never   Substance and Sexual Activity    Alcohol use:  Yes     Alcohol/week: 1.0 standard drink     Types: 1 Glasses of wine per week     Comment: rarely    Drug use: No    Sexual activity: Not on file   Other Topics Concern    Not on file   Social History Narrative    Not on file     Social Determinants of Health     Financial Resource Strain: Not on file   Food Insecurity: Not on file   Transportation Needs: Not on file   Physical Activity: Not on file   Stress: Not on file   Social Connections: Not on file   Intimate Partner Violence: Not on file   Housing Stability: Not on file       The patient has a family history that is negative for severe cardiovascular or respiratory issues, negative for reaction to anesthesia. Specifically there is no positive findings of gastrointestinal cancer in mother or father to the patient's knowledge    Recent Labs     02/17/23 2201 02/18/23  0732   WBC 10.6 12.2*   HGB 14.6 13.2   HCT 43.7 41.2   .5* 102.0*    125*       Recent Labs     02/17/23 2201 02/18/23  0732   * 137   K 4.6 4.2   CO2 28 26   PHOS  --  5.0*   BUN 68* 66*   CREATININE 2.1* 1.8*       Recent Labs     02/17/23 2201 02/18/23  0732   PROT 5.5* 5.2*   INR 1.3  --        No intake or output data in the 24 hours ending 02/18/23 0849    I have reviewed relevant labs from this admission and interpretation is included in my assessment and plan      Review of Systems  A complete 10 system review was performed and are otherwise negative unless mentioned in the above HPI. Specific negatives are listed below but may not include all those reviewed.     General ROS: negative obtundation, AMS  ENT ROS: negative rhinorrhea, epistaxis  Allergy and Immunology ROS: negative itchy/watery eyes or nasal congestion  Hematological and Lymphatic ROS: negative spontaneous bleeding or bruising  Endocrine ROS: negative  lethargy, mood swings, palpitations or polydipsia/polyuria  Respiratory ROS: negative sputum changes, stridor, tachypnea or wheezing  Cardiovascular ROS: negative for - loss of consciousness, murmur or orthopnea  Gastrointestinal ROS: negative for - hematochezia or hematemesis  Genito-Urinary ROS: negative for -  genital discharge or hematuria  Musculoskeletal ROS: negative for - focal weakness, gangrene  Psych/Neuro ROS: negative for - visual or auditory hallucinations, suicidal ideation      Physical exam:   BP (!) 128/50   Pulse 70   Temp 98 °F (36.7 °C) (Oral)   Resp 20   Ht 5' 5\" (1.651 m)   Wt 115 lb (52.2 kg)   SpO2 94%   BMI 19.14 kg/m²   General appearance:  NAD, appears stated age  Head: NCAT, PERRLA, EOMI, red conjunctiva  Neck: supple, no masses, trachea midline  Lungs: Equal chest rise bilateral, no retractions, no wheezing  Heart: Reg rate  Abdomen: soft, distended, nontende  Skin; warm and dry, no cyanosis  Gu: no cva tenderness, no identifiable or reducible hernia in right groin, may be subtle or reduced spontaneously  Extremities: atraumatic, no focal motor deficits, no open wounds  Psych: No tremor, visual hallucinations        Radiology: I reviewed relevant abdominal imaging from this admission and that available in the EMR including CT abd/pel from admission. My assessment is right inguinal hernia incarcerated, SBo    Assessment:  Areli Polo is a 80 y.o. female with incarcerated right inguinal hernia, SBO    Patient Active Problem List   Diagnosis    Substernal thyroid    Right cataract    Left cataract    SBO (small bowel obstruction) ([facility]       Plan:  NPO, NG tube  Repeat CT STAT  I cannot feel the hernia so may have reduced  If not reduced will go to Surgery to repair  Discussed with family bedside  High risk for postop vent support due to aspiration and pneumonia    Time spent reviewing past medical, surgical, social and family history, vitals, nursing assessment and images. Time spent face to face with patient and family counciling and discussing care exceeded 50% of the time of the consult. Additional time spent reviewing images and labs, discussing case with nursing, support staff and other physicians; as well as coordinating care. NOTE: This report, in part or full, may have been transcribed using voice recognition software. Every effort was made to ensure accuracy; however, inadvertent computerized transcription errors may be present.  Please excuse any transcriptional grammatical or spelling errors that may have escaped my editorial review.       Physician Signature: Electronically signed by Dr. Augustine Duke  [phone] (p)

## 2023-02-19 LAB
ALBUMIN SERPL-MCNC: 2.6 G/DL (ref 3.5–5.2)
ALP BLD-CCNC: 42 U/L (ref 35–104)
ALT SERPL-CCNC: 10 U/L (ref 0–32)
ANION GAP SERPL CALCULATED.3IONS-SCNC: 18 MMOL/L (ref 7–16)
AST SERPL-CCNC: 25 U/L (ref 0–31)
BASOPHILS ABSOLUTE: 0 E9/L (ref 0–0.2)
BASOPHILS RELATIVE PERCENT: 0.2 % (ref 0–2)
BILIRUB SERPL-MCNC: 0.3 MG/DL (ref 0–1.2)
BUN BLDV-MCNC: 54 MG/DL (ref 6–23)
BURR CELLS: ABNORMAL
CALCIUM SERPL-MCNC: 8 MG/DL (ref 8.6–10.2)
CHLORIDE BLD-SCNC: 98 MMOL/L (ref 98–107)
CO2: 25 MMOL/L (ref 22–29)
CREAT SERPL-MCNC: 1.3 MG/DL (ref 0.5–1)
EOSINOPHILS ABSOLUTE: 0 E9/L (ref 0.05–0.5)
EOSINOPHILS RELATIVE PERCENT: 0 % (ref 0–6)
GFR SERPL CREATININE-BSD FRML MDRD: 38 ML/MIN/1.73
GLUCOSE BLD-MCNC: 228 MG/DL (ref 74–99)
HCT VFR BLD CALC: 35.2 % (ref 34–48)
HEMOGLOBIN: 11.3 G/DL (ref 11.5–15.5)
LACTIC ACID: 1.2 MMOL/L (ref 0.5–2.2)
LACTIC ACID: 1.3 MMOL/L (ref 0.5–2.2)
LYMPHOCYTES ABSOLUTE: 0.16 E9/L (ref 1.5–4)
LYMPHOCYTES RELATIVE PERCENT: 1.7 % (ref 20–42)
MAGNESIUM: 1.9 MG/DL (ref 1.6–2.6)
MCH RBC QN AUTO: 33.3 PG (ref 26–35)
MCHC RBC AUTO-ENTMCNC: 32.1 % (ref 32–34.5)
MCV RBC AUTO: 103.8 FL (ref 80–99.9)
METER GLUCOSE: 167 MG/DL (ref 74–99)
METER GLUCOSE: 201 MG/DL (ref 74–99)
METER GLUCOSE: 214 MG/DL (ref 74–99)
METER GLUCOSE: 238 MG/DL (ref 74–99)
METER GLUCOSE: 275 MG/DL (ref 74–99)
MONOCYTES ABSOLUTE: 0.49 E9/L (ref 0.1–0.95)
MONOCYTES RELATIVE PERCENT: 6.1 % (ref 2–12)
NEUTROPHILS ABSOLUTE: 7.54 E9/L (ref 1.8–7.3)
NEUTROPHILS RELATIVE PERCENT: 92.2 % (ref 43–80)
PDW BLD-RTO: 13.2 FL (ref 11.5–15)
PHOSPHORUS: 3.8 MG/DL (ref 2.5–4.5)
PLATELET # BLD: 120 E9/L (ref 130–450)
PMV BLD AUTO: 13.1 FL (ref 7–12)
POIKILOCYTES: ABNORMAL
POTASSIUM SERPL-SCNC: 3.6 MMOL/L (ref 3.5–5)
RBC # BLD: 3.39 E12/L (ref 3.5–5.5)
SEDIMENTATION RATE, ERYTHROCYTE: 65 MM/HR (ref 0–20)
SODIUM BLD-SCNC: 141 MMOL/L (ref 132–146)
TOTAL PROTEIN: 5.1 G/DL (ref 6.4–8.3)
TROPONIN, HIGH SENSITIVITY: 59 NG/L (ref 0–9)
WBC # BLD: 8.2 E9/L (ref 4.5–11.5)

## 2023-02-19 PROCEDURE — 85025 COMPLETE CBC W/AUTO DIFF WBC: CPT

## 2023-02-19 PROCEDURE — 83735 ASSAY OF MAGNESIUM: CPT

## 2023-02-19 PROCEDURE — 2700000000 HC OXYGEN THERAPY PER DAY

## 2023-02-19 PROCEDURE — 84484 ASSAY OF TROPONIN QUANT: CPT

## 2023-02-19 PROCEDURE — 84100 ASSAY OF PHOSPHORUS: CPT

## 2023-02-19 PROCEDURE — 2580000003 HC RX 258: Performed by: INTERNAL MEDICINE

## 2023-02-19 PROCEDURE — 6370000000 HC RX 637 (ALT 250 FOR IP): Performed by: SURGERY

## 2023-02-19 PROCEDURE — C9113 INJ PANTOPRAZOLE SODIUM, VIA: HCPCS | Performed by: SURGERY

## 2023-02-19 PROCEDURE — 36415 COLL VENOUS BLD VENIPUNCTURE: CPT

## 2023-02-19 PROCEDURE — 1200000000 HC SEMI PRIVATE

## 2023-02-19 PROCEDURE — 93005 ELECTROCARDIOGRAM TRACING: CPT | Performed by: INTERNAL MEDICINE

## 2023-02-19 PROCEDURE — 6360000002 HC RX W HCPCS: Performed by: SURGERY

## 2023-02-19 PROCEDURE — 80053 COMPREHEN METABOLIC PANEL: CPT

## 2023-02-19 PROCEDURE — 2580000003 HC RX 258: Performed by: SURGERY

## 2023-02-19 PROCEDURE — 82962 GLUCOSE BLOOD TEST: CPT

## 2023-02-19 PROCEDURE — 6360000002 HC RX W HCPCS: Performed by: INTERNAL MEDICINE

## 2023-02-19 PROCEDURE — 85651 RBC SED RATE NONAUTOMATED: CPT

## 2023-02-19 PROCEDURE — 94640 AIRWAY INHALATION TREATMENT: CPT

## 2023-02-19 PROCEDURE — 83605 ASSAY OF LACTIC ACID: CPT

## 2023-02-19 RX ORDER — HEPARIN SODIUM 5000 [USP'U]/ML
5000 INJECTION, SOLUTION INTRAVENOUS; SUBCUTANEOUS 2 TIMES DAILY
Status: DISCONTINUED | OUTPATIENT
Start: 2023-02-19 | End: 2023-02-20

## 2023-02-19 RX ADMIN — BUDESONIDE 500 MCG: 0.5 INHALANT RESPIRATORY (INHALATION) at 06:00

## 2023-02-19 RX ADMIN — HEPARIN SODIUM 5000 UNITS: 5000 INJECTION INTRAVENOUS; SUBCUTANEOUS at 20:29

## 2023-02-19 RX ADMIN — ALBUTEROL SULFATE 2.5 MG: 2.5 SOLUTION RESPIRATORY (INHALATION) at 06:00

## 2023-02-19 RX ADMIN — INSULIN LISPRO 1 UNITS: 100 INJECTION, SOLUTION INTRAVENOUS; SUBCUTANEOUS at 00:07

## 2023-02-19 RX ADMIN — BUDESONIDE 500 MCG: 0.5 INHALANT RESPIRATORY (INHALATION) at 19:00

## 2023-02-19 RX ADMIN — ALBUTEROL SULFATE 2.5 MG: 2.5 SOLUTION RESPIRATORY (INHALATION) at 19:00

## 2023-02-19 RX ADMIN — IPRATROPIUM BROMIDE 0.5 MG: 0.5 SOLUTION RESPIRATORY (INHALATION) at 13:08

## 2023-02-19 RX ADMIN — IPRATROPIUM BROMIDE 0.5 MG: 0.5 SOLUTION RESPIRATORY (INHALATION) at 22:20

## 2023-02-19 RX ADMIN — PIPERACILLIN AND TAZOBACTAM 3375 MG: 3; .375 INJECTION, POWDER, FOR SOLUTION INTRAVENOUS at 03:31

## 2023-02-19 RX ADMIN — IPRATROPIUM BROMIDE 0.5 MG: 0.5 SOLUTION RESPIRATORY (INHALATION) at 08:53

## 2023-02-19 RX ADMIN — IPRATROPIUM BROMIDE 0.5 MG: 0.5 SOLUTION RESPIRATORY (INHALATION) at 06:00

## 2023-02-19 RX ADMIN — ARFORMOTEROL TARTRATE 15 MCG: 15 SOLUTION RESPIRATORY (INHALATION) at 19:00

## 2023-02-19 RX ADMIN — PIPERACILLIN AND TAZOBACTAM 3375 MG: 3; .375 INJECTION, POWDER, FOR SOLUTION INTRAVENOUS at 15:25

## 2023-02-19 RX ADMIN — METOPROLOL TARTRATE 12.5 MG: 25 TABLET, FILM COATED ORAL at 08:47

## 2023-02-19 RX ADMIN — ALBUTEROL SULFATE 2.5 MG: 2.5 SOLUTION RESPIRATORY (INHALATION) at 22:20

## 2023-02-19 RX ADMIN — Medication 10 ML: at 20:26

## 2023-02-19 RX ADMIN — SODIUM CHLORIDE, POTASSIUM CHLORIDE, SODIUM LACTATE AND CALCIUM CHLORIDE: 600; 310; 30; 20 INJECTION, SOLUTION INTRAVENOUS at 00:00

## 2023-02-19 RX ADMIN — SODIUM CHLORIDE, POTASSIUM CHLORIDE, SODIUM LACTATE AND CALCIUM CHLORIDE: 600; 310; 30; 20 INJECTION, SOLUTION INTRAVENOUS at 16:10

## 2023-02-19 RX ADMIN — ARFORMOTEROL TARTRATE 15 MCG: 15 SOLUTION RESPIRATORY (INHALATION) at 06:00

## 2023-02-19 RX ADMIN — PANTOPRAZOLE SODIUM 40 MG: 40 INJECTION, POWDER, FOR SOLUTION INTRAVENOUS at 08:47

## 2023-02-19 RX ADMIN — INSULIN LISPRO 1 UNITS: 100 INJECTION, SOLUTION INTRAVENOUS; SUBCUTANEOUS at 06:30

## 2023-02-19 RX ADMIN — IPRATROPIUM BROMIDE 0.5 MG: 0.5 SOLUTION RESPIRATORY (INHALATION) at 01:25

## 2023-02-19 RX ADMIN — Medication 10 ML: at 08:47

## 2023-02-19 RX ADMIN — HEPARIN SODIUM 5000 UNITS: 5000 INJECTION INTRAVENOUS; SUBCUTANEOUS at 06:30

## 2023-02-19 RX ADMIN — SODIUM CHLORIDE, POTASSIUM CHLORIDE, SODIUM LACTATE AND CALCIUM CHLORIDE: 600; 310; 30; 20 INJECTION, SOLUTION INTRAVENOUS at 08:47

## 2023-02-19 RX ADMIN — IPRATROPIUM BROMIDE 0.5 MG: 0.5 SOLUTION RESPIRATORY (INHALATION) at 19:00

## 2023-02-19 RX ADMIN — ALBUTEROL SULFATE 2.5 MG: 2.5 SOLUTION RESPIRATORY (INHALATION) at 08:53

## 2023-02-19 RX ADMIN — ALBUTEROL SULFATE 2.5 MG: 2.5 SOLUTION RESPIRATORY (INHALATION) at 13:08

## 2023-02-19 RX ADMIN — ALBUTEROL SULFATE 2.5 MG: 2.5 SOLUTION RESPIRATORY (INHALATION) at 01:25

## 2023-02-19 NOTE — PROGRESS NOTES
Pharmacist Review and Automatic Dose Adjustment of Prophylactic Enoxaparin    Reviewed reason(s) for admission/hospital problem list    The reviewing pharmacist has made an adjustment to the ordered enoxaparin dose or converted to UFH per the approved [facility] protocol and table as identified below. Keon Aguilar is a 80 y.o. female. Recent Labs     02/17/23  2201 02/18/23  0732 02/19/23  0433   CREATININE 2.1* 1.8* 1.3*       Estimated Creatinine Clearance: 22 mL/min (A) (based on SCr of 1.3 mg/dL (H)).     Recent Labs     02/18/23  0732 02/19/23  0433   HGB 13.2 11.3*   HCT 41.2 35.2   * 120*     Recent Labs     02/17/23  2201   INR 1.3       Height:   Ht Readings from Last 1 Encounters:   02/18/23 5' 5\" (1.651 m)     Weight:  Wt Readings from Last 1 Encounters:   02/19/23 112 lb 1.6 oz (50.8 kg)               Plan: Based upon the patient's weight and renal function    Ordered: Heparin 5,000 units SUBQ TID    Changed/converted to    New Order: Heparin 5,000 units SUBQ BID      Thank you,  Yasmin Pak, [facility]  2/19/2023, 1:42 PM

## 2023-02-19 NOTE — PROGRESS NOTES
Date:  2/19/2023  Patient: Pamela Mariee  Admission:  2/17/2023  8:36 PM  Admit DX: SBO (small bowel obstruction) ([facility] [K56.609]  Hyperglycemia [R73.9]  MAGALY (acute kidney injury) ([facility] [N17.9]  Acute respiratory failure with hypoxia (HCC) [J96.01]  Elevated lactic acid level [R79.89]  Pneumonia of left lung due to infectious organism, unspecified part of lung [J18.9]  Age:  80 y.o., 3/5/1929     LOS: 1 day       SUBJECTIVE:    The patient is seen and examined  She is denying any symptoms of chest pain or shortness of breath  She is lying down flat with no orthopnea  Vitals are stable  She is pacing at baseline rate of 70. OBJECTIVE:    BP (!) 135/54   Pulse 70   Temp 99 °F (37.2 °C) (Core)   Resp 24   Ht 5' 5\" (1.651 m)   Wt 112 lb 1.6 oz (50.8 kg)   SpO2 100%   BMI 18.65 kg/m²     Intake/Output Summary (Last 24 hours) at 2/19/2023 1357  Last data filed at 2/19/2023 0500  Gross per 24 hour   Intake 2193.82 ml   Output 2460 ml   Net -266.18 ml       EXAM:   Head: Normocephalic, Normal hearing, Oral mucosa is moist.  Neck: Supple, No carotid bruit, No jugular venous distention, No lymphadenopathy. Respiratory: Lungs are clear to auscultation, Respirations are non-labored, Breath sounds are equal, Symmetrical chest wall expansion. Cardiovascular: Normal rate, No murmur, No gallop, Good pulses equal in all extremities, No edema. Gastrointestinal: Soft, Non-tender, mildly distended, diminished bowel sounds. Musculoskeletal: Normal strength, No tenderness, No deformity. Integumentary:  Warm, Dry. Neurologic: Alert, Oriented, No focal deficits. Cognition and Speech: Oriented, Speech clear and coherent. Psychiatric: Cooperative, Appropriate mood & affect, Normal judgment.     Current Inpatient Medications:   heparin (porcine)  5,000 Units SubCUTAneous BID    pantoprazole  40 mg IntraVENous Daily    arformoterol tartrate  15 mcg Nebulization BID    budesonide  0.5 mg Nebulization BID    insulin lispro  0-4 Units SubCUTAneous Q6H    metoprolol tartrate  12.5 mg Oral BID    piperacillin-tazobactam  3,375 mg IntraVENous Q12H    sodium chloride flush  5-40 mL IntraVENous BID    albuterol  2.5 mg Nebulization Q4H    And    ipratropium  0.5 mg Nebulization Q4H       IV Infusions (if any):   lactated ringers IV soln 125 mL/hr at 02/19/23 0847    dextrose           Labs:   CBC:   Recent Labs     02/18/23  0732 02/19/23 0433   WBC 12.2* 8.2   HGB 13.2 11.3*   HCT 41.2 35.2   * 120*     BMP:   Recent Labs     02/18/23  0732 02/19/23 0433    141   K 4.2 3.6   CO2 26 25   BUN 66* 54*   CREATININE 1.8* 1.3*   LABGLOM 26 38   GLUCOSE 195* 228*     BNP: No results for input(s): BNP in the last 72 hours. PT/INR:   Recent Labs     02/17/23 2201   PROTIME 15.3*   INR 1.3     APTT:  Recent Labs     02/17/23 2201   APTT 29.1     CARDIAC ENZYMES:No results for input(s): CKTOTAL, CKMB, CKMBINDEX, TROPONINI in the last 72 hours. FASTING LIPID PANEL:  Lab Results   Component Value Date/Time    HDL 38 02/18/2023 07:32 AM    LDLCALC 23 02/18/2023 07:32 AM    TRIG 134 02/18/2023 07:32 AM     LIVER PROFILE:  Recent Labs     02/18/23  0732 02/19/23 0433   AST 22 25   ALT 9 10   LABALBU 2.6* 2.6*       Impression:        Small bowel obstruction  Nausea/vomiting  Acute renal insufficiency  Permanent atrial fibrillation  Previous pacemaker. Hypercholesterolemia              Plan of management:           Stable cardiac status  Continue metoprolol  Dr. Cazares Standard will resume cardiac care tomorrow                CC Dr. Yeyo Larios              **This report was transcribed using voice recognition software. Every effort was made to ensure accuracy; however, inadvertent computerized transcription errors may be present.

## 2023-02-19 NOTE — H&P
[address]                              HISTORY AND PHYSICAL    PATIENT NAME: Rhonda Daniel                      :        1929  MED REC NO:   [id number]                            ROOM:       IC06  ACCOUNT NO:   [de-identified]                           ADMIT DATE: 2023  PROVIDER:     Олег Mckeon DO    CHIEF COMPLAINT AND HISTORY OF CHIEF COMPLAINT:  This is a pleasant  71-year-old white female who was admitted to [address] [place] [address]. The  patient presented to hospital here through the emergency room under the  service of Dr. Selvin Espino. The patient presented to the hospital here with  chief complaint of nausea and vomiting. The patient states that several  days ago, the patient had had some symptomatology. This had  progressively worsened over the time. She presented to hospital here. At this time, she did have several emesis of dark fluid. Diagnostic  workup was done in the emergency room. This showed evidence of  small-bowel obstruction with finding of possible aspiration pneumonia. She was admitted to the hospital to the intermediate care unit. The  patient had been seen by Dr. Augustine Duke. Radiographic findings did show  evidence of a small-bowel obstruction which appeared to be due to a  right inguinal hernia. The patient has had decompression of the abdomen  and placement of an NG tube. From a cardiac standpoint of view, the  patient does have a history of pacemaker. Elevated troponin levels at  present suggesting demand ischemia. She currently has no complaints of  chest pain respiratory wise. She has been  having some vomiting. Radiographic finding did show evidence of a  pneumonia. Genitourinary wise, she denies any dysuria, hematuria, or  pyuria.   Neurologically, she denies any history of stroke, TIAs, etc.   The patient has had independent living until this event which happened  several days ago. ALLERGIES:  No known drug allergies. CURRENT MEDICATIONS:  At the present time include the following:  She  has been on Eliquis 2.5 mg daily, prednisolone 10 mg daily,  Zestril 10 mg daily, hydrochlorothiazide 25 mg daily, Zocor 40 mg daily,  Lopressor 50 mg daily, and multivitamin daily. PAST MEDICAL HISTORY:  Positive for the usual childhood diseases, a  history of gastroesophageal reflux disease, hypertension,  hyperlipidemia, and thyroid disorder. PAST SURGICAL HISTORY:  Includes appendectomy, cataract removal both  left and right, hysterectomy, and pacemaker insertion in , [facility]. FAMILY HISTORY:  Parents are . SOCIAL HISTORY:  The patient is currently . Denies any smoking. She uses alcohol currently, mother of five children. PHYSICAL EXAMINATION:  VITAL SIGNS:  Show height to be 5 feet 5 inches, weight is 115 pounds,  BMI 19.14. Blood pressure 134/47, pulse 80, respirations 17. GENERAL APPEARANCE:  This is a pleasant 78-year-old white female who is  resting comfortably at the present time, slight nausea. HEENT:  Normal.  There was nasogastric tube present in the left naris. NECK:  Reveals flat JVD. LUNGS:  Coarse, diminished on the right side with scattered rhonchi. HEART:  Fairly regular. Pacemaker present on the right infraclavicular  area. ABDOMEN:  Did reveal some pain. It is slightly distended. Bowel sounds  are diminished. EXTREMITIES:  Without any cyanosis, clubbing, or edema. IMPRESSION:  At this time include:  1. Small bowel obstruction secondary to right inguinal hernia. 2.  Coronary artery disease with status post pacemaker insertion with  elevated troponin levels suggesting demand ischemia. 3.  Abnormal chest x-ray showing pneumonia possibly secondary to  aspiration. 4.  Stage IV kidney disease with superimposed acute kidney injury  secondary to dehydration. 5.  Essential hypertension.   6. Hyperlipidemia, on Zocor. PLAN AND RECOMMENDATIONS:  At this time, currently the patient does  appear stable. The patient at this time does seem to be doing  relatively well. NG tube has been inserted. Cardiology consult has  been obtained. I have discussed her condition with Surgery. The  patient probably will go to Surgery today by Dr. Ann Marie Gomez. It does seem  that the troponin level is improving. It is possibly due to demand  ischemia. We will observe her closely in the intermediate care unit. Antibiotic has been instituted at this time for the treatment of the  underlying pneumonia along with aggressive respiratory therapy and  physical therapy. Pending results of further lab, will dictate further  care and treatment. Extensive discussion with the family member at this  time.         [address] [place] [address],     D: 02/18/2023 15:11:14       T: 02/18/2023 20:36:47     [email or web address] [email or web address]  Job#: [id number]     Doc#: [id number]    CC:

## 2023-02-19 NOTE — PROGRESS NOTES
Patient's daughter, Mickey Albert, brought in bilateral hearing aids and , patient's ovi, ovi , and ovi case, as well as a grey robe, and reading glasses.

## 2023-02-19 NOTE — PROGRESS NOTES
CRITICAL CARE PROGRESS NOTE   We are evaluating  This is a 81 yo F with pacemaker, hx reported oonly has HTN/HPL and hypothyroid, now admitted sp N/V, found to have incarcerated hernia. The patient's case was discussed in multidisciplinary rounds including critical care specialist, nursing, RT and pharmacy. Evaluation is as follows:     Scr improving, Resp status stable  Diuresing well     Somewhat confused this AM but also hilarious, telling nurses she doesn't like their questions .       OBJECTIVE:      Intake/Output Summary (Last 24 hours) at 2/19/2023 0758  Last data filed at 2/19/2023 0500  Gross per 24 hour   Intake 2193.82 ml   Output 2860 ml   Net -666.18 ml         PHYSICAL EXAM:  BP (!) 146/54   Pulse 73   Temp 99.1 °F (37.3 °C) (Core)   Resp 19   Ht 5' 5\" (1.651 m)   Wt 112 lb 1.6 oz (50.8 kg)   SpO2 100%   BMI 18.65 kg/m²   Access: perph  O2:NC   Gen: Awake and alert  CV: RRR S1 S2 no m/g/r  Resp: Slight rales bl  Abd: soft NT ND  Extr: 1+ LE edema  Neuro: non focal    MEDICATIONS:   pantoprazole  40 mg IntraVENous Daily    arformoterol tartrate  15 mcg Nebulization BID    budesonide  0.5 mg Nebulization BID    insulin lispro  0-4 Units SubCUTAneous Q6H    metoprolol tartrate  12.5 mg Oral BID    piperacillin-tazobactam  3,375 mg IntraVENous Q12H    heparin (porcine)  5,000 Units SubCUTAneous 3 times per day    sodium chloride flush  5-40 mL IntraVENous BID    albuterol  2.5 mg Nebulization Q4H    And    ipratropium  0.5 mg Nebulization Q4H      lactated ringers IV soln 125 mL/hr at 02/19/23 0000    dextrose       glucose, dextrose bolus **OR** dextrose bolus, glucagon (rDNA), dextrose, sodium chloride flush      LABS:  CBC:   Lab Results   Component Value Date/Time    WBC 8.2 02/19/2023 04:33 AM    RBC 3.39 02/19/2023 04:33 AM    HGB 11.3 02/19/2023 04:33 AM    HCT 35.2 02/19/2023 04:33 AM    .8 02/19/2023 04:33 AM    MCH 33.3 02/19/2023 04:33 AM    MCHC 32.1 02/19/2023 04:33 AM    RDW 13.2 02/19/2023 04:33 AM     02/19/2023 04:33 AM    MPV 13.1 02/19/2023 04:33 AM     CMP:    Lab Results   Component Value Date/Time     02/19/2023 04:33 AM    K 3.6 02/19/2023 04:33 AM    K 4.6 02/17/2023 10:01 PM    CL 98 02/19/2023 04:33 AM    CO2 25 02/19/2023 04:33 AM    BUN 54 02/19/2023 04:33 AM    CREATININE 1.3 02/19/2023 04:33 AM    GFRAA >60 09/27/2018 02:01 PM    LABGLOM 38 02/19/2023 04:33 AM    GLUCOSE 228 02/19/2023 04:33 AM    PROT 5.1 02/19/2023 04:33 AM    LABALBU 2.6 02/19/2023 04:33 AM    CALCIUM 8.0 02/19/2023 04:33 AM    BILITOT 0.3 02/19/2023 04:33 AM    ALKPHOS 42 02/19/2023 04:33 AM    AST 25 02/19/2023 04:33 AM    ALT 10 02/19/2023 04:33 AM     Magnesium:    Lab Results   Component Value Date/Time    MG 1.9 02/19/2023 04:33 AM     Phosphorus:    Lab Results   Component Value Date/Time    PHOS 3.8 02/19/2023 04:33 AM     LDH:  No results found for: LDH  PT/INR:    Lab Results   Component Value Date/Time    PROTIME 15.3 02/17/2023 10:01 PM    INR 1.3 02/17/2023 10:01 PM     Troponin:  No results found for: TROPONINI  ABG:  No results found for: PH, PCO2, PO2, HCO3, BE, THGB, TCO2, O2SAT  HgBA1c:    Lab Results   Component Value Date/Time    LABA1C 9.5 02/18/2023 07:32 AM         RADIOLOGY:  CT ABDOMEN PELVIS WO CONTRAST Additional Contrast? None   Final Result   Small bowel obstruction secondary to a right inguinal hernia. The degree of   small bowel dilation is stable to slightly increased compared with CT from   yesterday. No evidence of bowel ischemia at this time. Small bilateral pleural effusions with bibasilar atelectasis and somewhat   consolidative airspace opacities in the lingula. Lingular opacities may be   secondary to atelectasis, pneumonia and or aspiration. XR ABDOMEN FOR NG/OG/NE TUBE PLACEMENT   Final Result   Gastric tube in good position. RECOMMENDATION:   Careful clinical correlation and follow up recommended.          CT CHEST WO CONTRAST Final Result   Ill-defined opacities left lower and upper lobes are consistent with   infiltrates, and to a lesser degree is not excluded in the right lower lobe. CT ABDOMEN PELVIS WO CONTRAST Additional Contrast? None   Final Result   Small-bowel obstruction due to a right inguinal hernia. XR CHEST PORTABLE   Final Result   Left lower lung alveolar infiltrate consistent with pneumonia. Small bilateral pleural effusions. PROBLEM LIST:  Principal Problem:    SBO (small bowel obstruction) (HCC)  Active Problems:    Pneumonia of left lung due to infectious organism    Incarcerated inguinal hernia, unilateral  Resolved Problems:    * No resolved hospital problems. *      ASSESSMENT/PLAN:  Monitor resp status closely  Diurese, patient sounds overloaded and has LE edema  Bs ab   Watch renal function closely   Post op care per surgery, appreciate their input    If pt appears stable from resp standpoint can be managed on RNF. DVT Proph, Feeding per surgery, PT/OT/OOB    ATTESTATION:  ICU Staff Physician note of personal involvement in Care  As the attending physician, I certify that I personally reviewed the patients history and personnally examined the patient to confirm the physical findings described above,  And that I reviewed the relevant imaging studies and available reports. I also discussed the differential diagnosis and all of the proposed management plans with the patient and individuals accompanying the patient to this visit. They had the opportunity to ask questions about the proposed management plans and to have those questions answered. This patient has a high probability of sudden, clinically significant deterioration, which requires the highest level of physician preparedness to intervene urgently. I managed/supervised life or organ supporting interventions that required frequent physician assessment.    I devoted my full attention to the direct care of this patient for the amount of time indicated below. Time I spent with the family or surrogate(s) is included only if the patient was incapable of providing the necessary information or participating in medical decisions - Time devoted to teaching and to any procedures I billed separately is not included.     CRITICAL CARE TIME:  32 min    Mary Hall MD  Pulmonary and Critical Care Medicine  02/19/23

## 2023-02-19 NOTE — PROGRESS NOTES
General Surgery Progress Note  Kaylie De León MD, MS    Patient's Name/Date of Birth: Rex Meza / 3/5/1929    Date: February 19, 2023     Surgeon: Dayana Garcia MD    Chief Complaint: s/p lap inguinal hernia repair    Patient Active Problem List   Diagnosis    Substernal thyroid    Right cataract    Left cataract    SBO (small bowel obstruction) ([address]    Pneumonia of left lung due to infectious organism    Incarcerated inguinal hernia, unilateral       Subjective: doing well, tolerating liquids, NG clamped    Objective:  BP (!) 135/54   Pulse 70   Temp 99 °F (37.2 °C) (Core)   Resp 24   Ht 5' 5\" (1.651 m)   Wt 112 lb 1.6 oz (50.8 kg)   SpO2 100%   BMI 18.65 kg/m²   Labs:  Recent Labs     02/17/23  2201 02/18/23  0732 02/19/23  0433   WBC 10.6 12.2* 8.2   HGB 14.6 13.2 11.3*   HCT 43.7 41.2 35.2     Lab Results   Component Value Date    CREATININE 1.3 (H) 02/19/2023    BUN 54 (H) 02/19/2023     02/19/2023    K 3.6 02/19/2023    CL 98 02/19/2023    CO2 25 02/19/2023     No results for input(s): LIPASE, AMYLASE in the last 72 hours.       General appearance:  NAD  Head: NCAT, PERRLA, EOMI, red conjunctiva  Neck: supple, no masses  Lungs: CTAB, equal chest rise bilateral  Heart: Reg rate  Abdomen: soft, nondistended, tender appropriately, incision C/D/I  Skin; no lesions  Gu: no cva tenderness  Extremities: extremities normal, atraumatic, no cyanosis or edema      Assessment/Plan:  Rex Meza is a 80 y.o. female POD 1 dx lap and right inguinal hernia repair for incarcerated SBO, aspiration and hypoxia on presentation to ED    Clamp and full liquids  If tolerated ok to remove in 4hrs  Out of bed  Posey out once up  Zach Hannah out of ICU      Physician Signature: Electronically signed by Dr. Dayana Garcia  [phone] (p)  2/19/2023  12:50 PM

## 2023-02-19 NOTE — PROGRESS NOTES
Pt transferred on telemetry with IV fluids/AB infusing by transporter. Pt belongings placed into bag and transferred with the patient. Attempt made to notify all contacts on patient list, with no luck reaching anyone. This nurse left a brief voice message left with daughter, Teresita Cahudhari, requesting a call back for an update.

## 2023-02-19 NOTE — PLAN OF CARE
Problem: Discharge Planning  Goal: Discharge to home or other facility with appropriate resources  Outcome: Progressing     Problem: ABCDS Injury Assessment  Goal: Absence of physical injury  Outcome: Progressing     Problem: Skin/Tissue Integrity  Goal: Absence of new skin breakdown  Description: 1. Monitor for areas of redness and/or skin breakdown  2. Assess vascular access sites hourly  3. Every 4-6 hours minimum:  Change oxygen saturation probe site  4. Every 4-6 hours:  If on nasal continuous positive airway pressure, respiratory therapy assess nares and determine need for appliance change or resting period.   Outcome: Progressing     Problem: Safety - Adult  Goal: Free from fall injury  Outcome: Progressing     Problem: Pain  Goal: Verbalizes/displays adequate comfort level or baseline comfort level  Outcome: Progressing     Problem: Musculoskeletal - Adult  Goal: Return mobility to safest level of function  Outcome: Progressing

## 2023-02-19 NOTE — PROGRESS NOTES
Internal Medicine Progress Note    [facility]    Caesar Axel. Alvino Giles., CHON Kwok Asp, D.O., CHON Castro D.O. Taylor Mukherjee D.O. Diane Bhatt, MSN, APRN, NP-C  Janell Hoyos. Tad Hooker, MSN, APRN-CNP     Primary Care Physician: Johanna Shen MD   Admitting Physician:  Zeeshan Dudley DO  Admission date and time: 2/17/2023  8:36 PM    Room:  [place] [address]  Admitting diagnosis: SBO [facility]small [facility] obstruction) ([facility] [K56.609]  Hyperglycemia [R73.9]  MAGALY (acute kidney injury) ([facility] [N17.9]  Acute respiratory failure with hypoxia (HCC) [J96.01]  Elevated lactic acid level [R79.89]  Pneumonia of left lung due to infectious organism, unspecified part of lung [J18.9]    Patient Name: Sagar So  MRN: [id number]    Date of Service: 2/19/2023     Subjective: General Gao is a 80 y.o. female who was seen and examined today,2/19/2023, at the bedside. The patient is doing very well today. The patient underwent repair of a inguinal hernia yesterday and seem to be doing well. The patient is hemodynamically stable at the present time. NG tube remain in place. The patient is on nasal cannula. The patient troponin level is declining. She has request something to eat and appears stable for transfer    No family present during my examination. Review of System:   Constitutional:   Denies fever or chills, weight loss or gain, fatigue or malaise. HEENT:   Denies ear pain, sore throat, sinus or eye problems. NG tube in place  Cardiovascular:   Denies any chest pain, irregular heartbeats, or palpitations. Respiratory:   Denies shortness of breath, coughing, sputum production, hemoptysis, or wheezing. Gastrointestinal:   Denies nausea, vomiting, diarrhea, or constipation. Mild postsurgical pain  Genitourinary:    Denies any urgency, frequency, hematuria. Voiding  without difficulty.   Extremities:   Denies lower extremity swelling, edema or cyanosis.   Neurology:    Denies any headache or focal neurological deficits, Denies generalized weakness or memory difficulty.   Psch:   Denies being anxious or depressed.  Musculoskeletal:    Denies  myalgias, joint complaints or back pain.   Integumentary:   Denies any rashes, ulcers, or excoriations.  Denies bruising.  Hematologic/Lymphatic:  Denies bruising or bleeding.    Physical Exam:  No intake/output data recorded.    Intake/Output Summary (Last 24 hours) at 2/19/2023 1345  Last data filed at 2/19/2023 0500  Gross per 24 hour   Intake 2193.82 ml   Output 2460 ml   Net -266.18 ml   I/O last 3 completed shifts:  In: 2193.8 [I.V.:2012.5; NG/GT:40; IV Piggyback:141.3]  Out: 2860 [Urine:2500; Emesis/NG output:360]  Patient Vitals for the past 96 hrs (Last 3 readings):   Weight   02/19/23 0500 112 lb 1.6 oz (50.8 kg)   02/18/23 0628 115 lb (52.2 kg)     Vital Signs:   Blood pressure (!) 135/54, pulse 70, temperature 99 °F (37.2 °C), temperature source Core, resp. rate 24, height 5' 5\" (1.651 m), weight 112 lb 1.6 oz (50.8 kg), SpO2 100 %, not currently breastfeeding.    General appearance:  Alert, responsive, oriented to person, place, and time. Well preserved, alert, no distress.  Head:  Normocephalic. No masses, lesions or tenderness.  Eyes:  PERRLA.  EOMI.  Sclera clear.  Buccal mucosa moist.  ENT:  Ears normal. Mucosa normal.  NG tube right naris.  Nasal cannula  Neck:    Supple. Trachea midline. No thyromegaly. No JVD. No bruits.  Heart:    Rhythm regular. Rate controlled.  1/6 murmurs.  Lungs:    Coarse on right side  Abdomen:   Soft. Non-tender. Non-distended. Bowel sounds positive. No organomegaly or masses.  No pain on palpation.  Right inguinal tenderness  Extremities:    Peripheral pulses present.  No peripheral edema.  No ulcers. No cyanosis. No clubbing.  Neurologic:    Alert x 3.  No focal deficit.  Cranial nerves grossly intact. No focal weakness.  Psych:   Behavior is normal.  Mood appears normal. Speech is not rapid and/or pressured. Musculoskeletal:   Spine ROM normal. Muscular strength intact. Gait not assessed. Integumentary:  No rashes  Skin normal color and texture. Genitalia/Breast:  Posey catheter    Medication:  Scheduled Meds:   heparin (porcine)  5,000 Units SubCUTAneous BID    pantoprazole  40 mg IntraVENous Daily    arformoterol tartrate  15 mcg Nebulization BID    budesonide  0.5 mg Nebulization BID    insulin lispro  0-4 Units SubCUTAneous Q6H    metoprolol tartrate  12.5 mg Oral BID    piperacillin-tazobactam  3,375 mg IntraVENous Q12H    sodium chloride flush  5-40 mL IntraVENous BID    albuterol  2.5 mg Nebulization Q4H    And    ipratropium  0.5 mg Nebulization Q4H     Continuous Infusions:   lactated ringers IV soln 125 mL/hr at 02/19/23 0847    dextrose         Objective Data:  Recent Labs     02/17/23 2201 02/18/23  0732 02/19/23  0433   WBC 10.6 12.2* 8.2   RBC 4.35 4.04 3.39*   HGB 14.6 13.2 11.3*   HCT 43.7 41.2 35.2   .5* 102.0* 103.8*   MCH 33.6 32.7 33.3   MCHC 33.4 32.0 32.1   RDW 13.0 13.2 13.2    125* 120*   MPV 13.1* 12.6* 13.1*     Recent Labs     02/17/23 2201 02/18/23  0732 02/19/23  0433   * 137 141   K 4.6 4.2 3.6   CL 85* 93* 98   CO2 28 26 25   BUN 68* 66* 54*   CREATININE 2.1* 1.8* 1.3*   GLUCOSE 236* 195* 228*   CALCIUM 9.2 8.3* 8.0*   PROT 5.5* 5.2* 5.1*   LABALBU 3.2* 2.6* 2.6*   BILITOT 0.8 0.7 0.3   ALKPHOS 41 39 42   AST 22 22 25   ALT 10 9 10     No results found for: TROPONINI               Assessment:    Small bowel obstruction secondary to right inguinal hernia. Coronary artery disease with status post pacemaker insertion with elevated troponin levels suggesting demand ischemia. Abnormal chest x-ray showing pneumonia possibly secondary to aspiration. Stage IV kidney disease with superimposed acute kidney injury secondary to dehydration. Essential hypertension. Hyperlipidemia, on Zocor      Plan:      The patient is doing well from a surgical standpoint of view. Patient was placed on a clear liquid diet. NG tube will be clamped and possibly removed later if not nauseated  Continue respiratory treatment and  antibiotics for suspected aspiration pneumonia  Transfer to intermediate care unit  Troponin level improving  Physical therapy to see evaluate and treat  Repeat chest x-ray tomorrow  Treat chronic comorbidities      Greater than 40 minutes of critical care time was spent with the patient. This includes chart review, , and discussion with those consultants involved in the patient's care. .    More than 50% of my  time was spent at the bedside counseling/coordinating care with the patient and/or family with face to face contact. This time was spent reviewing notes and laboratory data as well as instructing and counseling the patient. Time I spent with the family or surrogate(s) is included only if the patient was incapable of providing the necessary information or participating in medical decisions. I also discussed the differential diagnosis and all of the proposed management plans with the patient and individuals accompanying the patient.     Leandro Rodriguez DO, F.A.C.O.I.  2/19/2023  1:45 PM

## 2023-02-19 NOTE — PLAN OF CARE
Problem: ABCDS Injury Assessment  Goal: Absence of physical injury  Outcome: Progressing     Problem: Skin/Tissue Integrity  Goal: Absence of new skin breakdown  Description: 1. Monitor for areas of redness and/or skin breakdown  2. Assess vascular access sites hourly  3. Every 4-6 hours minimum:  Change oxygen saturation probe site  4. Every 4-6 hours:  If on nasal continuous positive airway pressure, respiratory therapy assess nares and determine need for appliance change or resting period.   Outcome: Progressing     Problem: Safety - Adult  Goal: Free from fall injury  Outcome: Progressing     Problem: Pain  Goal: Verbalizes/displays adequate comfort level or baseline comfort level  Outcome: Progressing  Flowsheets (Taken 2/18/2023 1600)  Verbalizes/displays adequate comfort level or baseline comfort level: Encourage patient to monitor pain and request assistance     Problem: Skin/Tissue Integrity - Adult  Goal: Skin integrity remains intact  Outcome: Progressing  Goal: Incisions, wounds, or drain sites healing without S/S of infection  Outcome: Progressing     Problem: Genitourinary - Adult  Goal: Urinary catheter remains patent  Outcome: Progressing     Problem: Infection - Adult  Goal: Absence of infection during hospitalization  Outcome: Progressing

## 2023-02-20 LAB
HBA1C MFR BLD: 9.3 % (ref 4–5.6)
METER GLUCOSE: 136 MG/DL (ref 74–99)
METER GLUCOSE: 205 MG/DL (ref 74–99)
METER GLUCOSE: 219 MG/DL (ref 74–99)
METER GLUCOSE: 259 MG/DL (ref 74–99)
METER GLUCOSE: 265 MG/DL (ref 74–99)
MRSA CULTURE ONLY: NORMAL

## 2023-02-20 PROCEDURE — 82962 GLUCOSE BLOOD TEST: CPT

## 2023-02-20 PROCEDURE — 97535 SELF CARE MNGMENT TRAINING: CPT

## 2023-02-20 PROCEDURE — 6370000000 HC RX 637 (ALT 250 FOR IP): Performed by: SURGERY

## 2023-02-20 PROCEDURE — 1200000000 HC SEMI PRIVATE

## 2023-02-20 PROCEDURE — 97530 THERAPEUTIC ACTIVITIES: CPT

## 2023-02-20 PROCEDURE — 2580000003 HC RX 258: Performed by: SURGERY

## 2023-02-20 PROCEDURE — 6360000002 HC RX W HCPCS: Performed by: INTERNAL MEDICINE

## 2023-02-20 PROCEDURE — 2580000003 HC RX 258: Performed by: INTERNAL MEDICINE

## 2023-02-20 PROCEDURE — 36415 COLL VENOUS BLD VENIPUNCTURE: CPT

## 2023-02-20 PROCEDURE — 97165 OT EVAL LOW COMPLEX 30 MIN: CPT

## 2023-02-20 PROCEDURE — 6360000002 HC RX W HCPCS: Performed by: SURGERY

## 2023-02-20 PROCEDURE — 94640 AIRWAY INHALATION TREATMENT: CPT

## 2023-02-20 PROCEDURE — 83036 HEMOGLOBIN GLYCOSYLATED A1C: CPT

## 2023-02-20 PROCEDURE — 2700000000 HC OXYGEN THERAPY PER DAY

## 2023-02-20 PROCEDURE — 6370000000 HC RX 637 (ALT 250 FOR IP): Performed by: INTERNAL MEDICINE

## 2023-02-20 RX ORDER — INSULIN LISPRO 100 [IU]/ML
0-4 INJECTION, SOLUTION INTRAVENOUS; SUBCUTANEOUS NIGHTLY
Status: DISCONTINUED | OUTPATIENT
Start: 2023-02-20 | End: 2023-02-23 | Stop reason: HOSPADM

## 2023-02-20 RX ORDER — SENNA PLUS 8.6 MG/1
1 TABLET ORAL NIGHTLY
Status: DISCONTINUED | OUTPATIENT
Start: 2023-02-20 | End: 2023-02-23 | Stop reason: HOSPADM

## 2023-02-20 RX ORDER — DOCUSATE SODIUM 100 MG/1
100 CAPSULE, LIQUID FILLED ORAL DAILY
Status: DISCONTINUED | OUTPATIENT
Start: 2023-02-20 | End: 2023-02-23 | Stop reason: HOSPADM

## 2023-02-20 RX ORDER — DEXTROSE MONOHYDRATE 100 MG/ML
INJECTION, SOLUTION INTRAVENOUS CONTINUOUS PRN
Status: DISCONTINUED | OUTPATIENT
Start: 2023-02-20 | End: 2023-02-23 | Stop reason: HOSPADM

## 2023-02-20 RX ORDER — POTASSIUM CHLORIDE 7.45 MG/ML
10 INJECTION INTRAVENOUS PRN
Status: DISCONTINUED | OUTPATIENT
Start: 2023-02-20 | End: 2023-02-23 | Stop reason: HOSPADM

## 2023-02-20 RX ORDER — POTASSIUM CHLORIDE 20 MEQ/1
40 TABLET, EXTENDED RELEASE ORAL PRN
Status: DISCONTINUED | OUTPATIENT
Start: 2023-02-20 | End: 2023-02-23 | Stop reason: HOSPADM

## 2023-02-20 RX ORDER — INSULIN LISPRO 100 [IU]/ML
0-8 INJECTION, SOLUTION INTRAVENOUS; SUBCUTANEOUS
Status: DISCONTINUED | OUTPATIENT
Start: 2023-02-20 | End: 2023-02-23 | Stop reason: HOSPADM

## 2023-02-20 RX ORDER — BISACODYL 10 MG
10 SUPPOSITORY, RECTAL RECTAL ONCE
Status: DISCONTINUED | OUTPATIENT
Start: 2023-02-20 | End: 2023-02-23 | Stop reason: HOSPADM

## 2023-02-20 RX ORDER — SODIUM CHLORIDE AND POTASSIUM CHLORIDE 150; 900 MG/100ML; MG/100ML
INJECTION, SOLUTION INTRAVENOUS CONTINUOUS
Status: DISCONTINUED | OUTPATIENT
Start: 2023-02-20 | End: 2023-02-21

## 2023-02-20 RX ORDER — PANTOPRAZOLE SODIUM 40 MG/1
40 TABLET, DELAYED RELEASE ORAL
Status: DISCONTINUED | OUTPATIENT
Start: 2023-02-21 | End: 2023-02-23 | Stop reason: HOSPADM

## 2023-02-20 RX ORDER — MAGNESIUM SULFATE IN WATER 40 MG/ML
2000 INJECTION, SOLUTION INTRAVENOUS PRN
Status: DISCONTINUED | OUTPATIENT
Start: 2023-02-20 | End: 2023-02-23 | Stop reason: HOSPADM

## 2023-02-20 RX ADMIN — SODIUM CHLORIDE AND POTASSIUM CHLORIDE: 9; 1.49 INJECTION, SOLUTION INTRAVENOUS at 10:09

## 2023-02-20 RX ADMIN — PIPERACILLIN SODIUM AND TAZOBACTAM SODIUM 3375 MG: 3; 375 INJECTION, POWDER, FOR SOLUTION INTRAVENOUS at 23:07

## 2023-02-20 RX ADMIN — PIPERACILLIN SODIUM AND TAZOBACTAM SODIUM 3375 MG: 3; 375 INJECTION, POWDER, FOR SOLUTION INTRAVENOUS at 13:05

## 2023-02-20 RX ADMIN — ALBUTEROL SULFATE 2.5 MG: 2.5 SOLUTION RESPIRATORY (INHALATION) at 06:11

## 2023-02-20 RX ADMIN — BUDESONIDE 500 MCG: 0.5 INHALANT RESPIRATORY (INHALATION) at 18:20

## 2023-02-20 RX ADMIN — APIXABAN 2.5 MG: 5 TABLET, FILM COATED ORAL at 12:05

## 2023-02-20 RX ADMIN — PIPERACILLIN AND TAZOBACTAM 3375 MG: 3; .375 INJECTION, POWDER, FOR SOLUTION INTRAVENOUS at 04:13

## 2023-02-20 RX ADMIN — ALBUTEROL SULFATE 2.5 MG: 2.5 SOLUTION RESPIRATORY (INHALATION) at 13:27

## 2023-02-20 RX ADMIN — IPRATROPIUM BROMIDE 0.5 MG: 0.5 SOLUTION RESPIRATORY (INHALATION) at 09:56

## 2023-02-20 RX ADMIN — SENNOSIDES 8.6 MG: 8.6 TABLET, FILM COATED ORAL at 21:20

## 2023-02-20 RX ADMIN — DOCUSATE SODIUM 100 MG: 100 CAPSULE, LIQUID FILLED ORAL at 15:45

## 2023-02-20 RX ADMIN — INSULIN LISPRO 2 UNITS: 100 INJECTION, SOLUTION INTRAVENOUS; SUBCUTANEOUS at 00:35

## 2023-02-20 RX ADMIN — ALBUTEROL SULFATE 2.5 MG: 2.5 SOLUTION RESPIRATORY (INHALATION) at 22:04

## 2023-02-20 RX ADMIN — IPRATROPIUM BROMIDE 0.5 MG: 0.5 SOLUTION RESPIRATORY (INHALATION) at 01:44

## 2023-02-20 RX ADMIN — IPRATROPIUM BROMIDE 0.5 MG: 0.5 SOLUTION RESPIRATORY (INHALATION) at 18:19

## 2023-02-20 RX ADMIN — BUDESONIDE 500 MCG: 0.5 INHALANT RESPIRATORY (INHALATION) at 06:11

## 2023-02-20 RX ADMIN — ALBUTEROL SULFATE 2.5 MG: 2.5 SOLUTION RESPIRATORY (INHALATION) at 18:19

## 2023-02-20 RX ADMIN — APIXABAN 2.5 MG: 5 TABLET, FILM COATED ORAL at 21:20

## 2023-02-20 RX ADMIN — ALBUTEROL SULFATE 2.5 MG: 2.5 SOLUTION RESPIRATORY (INHALATION) at 09:56

## 2023-02-20 RX ADMIN — IPRATROPIUM BROMIDE 0.5 MG: 0.5 SOLUTION RESPIRATORY (INHALATION) at 22:04

## 2023-02-20 RX ADMIN — ALBUTEROL SULFATE 2.5 MG: 2.5 SOLUTION RESPIRATORY (INHALATION) at 01:44

## 2023-02-20 RX ADMIN — Medication 10 ML: at 10:03

## 2023-02-20 RX ADMIN — ARFORMOTEROL TARTRATE 15 MCG: 15 SOLUTION RESPIRATORY (INHALATION) at 18:19

## 2023-02-20 RX ADMIN — METOPROLOL TARTRATE 12.5 MG: 25 TABLET, FILM COATED ORAL at 21:20

## 2023-02-20 RX ADMIN — ARFORMOTEROL TARTRATE 15 MCG: 15 SOLUTION RESPIRATORY (INHALATION) at 06:11

## 2023-02-20 RX ADMIN — SODIUM CHLORIDE, POTASSIUM CHLORIDE, SODIUM LACTATE AND CALCIUM CHLORIDE: 600; 310; 30; 20 INJECTION, SOLUTION INTRAVENOUS at 00:28

## 2023-02-20 RX ADMIN — INSULIN LISPRO 1 UNITS: 100 INJECTION, SOLUTION INTRAVENOUS; SUBCUTANEOUS at 05:44

## 2023-02-20 RX ADMIN — INSULIN LISPRO 4 UNITS: 100 INJECTION, SOLUTION INTRAVENOUS; SUBCUTANEOUS at 16:30

## 2023-02-20 RX ADMIN — IPRATROPIUM BROMIDE 0.5 MG: 0.5 SOLUTION RESPIRATORY (INHALATION) at 13:27

## 2023-02-20 RX ADMIN — Medication 10 ML: at 21:21

## 2023-02-20 RX ADMIN — SODIUM CHLORIDE AND POTASSIUM CHLORIDE: 9; 1.49 INJECTION, SOLUTION INTRAVENOUS at 23:06

## 2023-02-20 RX ADMIN — METOPROLOL TARTRATE 12.5 MG: 25 TABLET, FILM COATED ORAL at 12:05

## 2023-02-20 RX ADMIN — IPRATROPIUM BROMIDE 0.5 MG: 0.5 SOLUTION RESPIRATORY (INHALATION) at 06:11

## 2023-02-20 NOTE — PROGRESS NOTES
Internal Medicine Progress Note    [facility]    Caesar Axel. Alvino Giles., CHON Kwok Asp, D.O., CHON Castro D.O. Taylor Mukherjee D.O. Diane Bhatt, MSN, APRN, NP-C  Janell Hoyos. Tad Hooker, MSN, APRN-CNP     Primary Care Physician: Johanna Shen MD   Admitting Physician:  Zeeshan Dudley DO  Admission date and time: 2/17/2023  8:36 PM    Room:  [address] [place] [address]  Admitting diagnosis: SBO (small bowel obstruction) ([facility] [K56.609]  Hyperglycemia [R73.9]  MAGAL[facility] [N17.9]  Acute respiratory failure with hypoxia (HCC) [J96.01]  Elevated lactic acid level [R79.89]  Pneumonia of left lung due to infectious organism, unspecified part of lung [J18.9]    Patient Name: Sagar So  MRN: [id number]    Date of Service: 2/20/2023     Subjective: General Gao is a 80 y.o. female who was seen and examined today,2/20/2023, at the bedside. General Gao was transferred to the telemetry floor yesterday. She was mildly confused on early examination with the surgery team this morning but seems to have returned to baseline today. She answers all questions accordingly. She remains profoundly weak and deconditioned. We discussed the absolute need for increased activity today. No family members were present during my examination. Review of System:   Constitutional:   Denies fever or chills, weight loss or gain, fatigue or malaise. HEENT:   Denies ear pain, sore throat, sinus or eye problems. Nasal cannula oxygen is in place. Cardiovascular:   Denies any chest pain, irregular heartbeats, or palpitations. Respiratory:   Denies shortness of breath, coughing, sputum production, hemoptysis, or wheezing. Gastrointestinal:   Denies nausea, vomiting, diarrhea, or constipation. Mild postsurgical pain  Genitourinary:    Denies any urgency, frequency, hematuria. Voiding  without difficulty.   Extremities:   Denies lower extremity swelling, edema or cyanosis. Neurology:    Denies any headache or focal neurological deficits, admits to generalized weakness and deconditioning. Psch:   Denies being anxious or depressed. Musculoskeletal:    Denies  myalgias, joint complaints or back pain. Integumentary:   Denies any rashes, ulcers, or excoriations. Denies bruising. Hematologic/Lymphatic:  Denies bruising or bleeding. Physical Exam:  No intake/output data recorded. Intake/Output Summary (Last 24 hours) at 2/20/2023 0941  Last data filed at 2/20/2023 0544  Gross per 24 hour   Intake 1923.47 ml   Output 800 ml   Net 1123.47 ml   I/O last 3 completed shifts: In: 2917.3 [I.V.:2736; NG/GT:40; IV Piggyback:141.3]  Out: 6399 [Urine:2900; Emesis/NG output:360]  Patient Vitals for the past 96 hrs (Last 3 readings):   Weight   02/19/23 0500 112 lb 1.6 oz (50.8 kg)   02/18/23 0628 115 lb (52.2 kg)     Vital Signs:   Blood pressure (!) 129/49, pulse 70, temperature 98.9 °F (37.2 °C), temperature source Oral, resp. rate 20, height 5' 5\" (1.651 m), weight 112 lb 1.6 oz (50.8 kg), SpO2 91 %, not currently breastfeeding. General appearance:  Awake and alert. Chronically ill-appearing. Head:  Normocephalic. No masses, lesions or tenderness. Eyes:  PERRLA. EOMI. Sclera clear. Buccal mucosa moist.  ENT:  Ears normal. Mucosa normal.  NG tube right naris. Nasal cannula oxygen is in place. Neck:    Supple. Trachea midline. No thyromegaly. No JVD. No bruits. Heart:    Rhythm regular. Rate controlled. 1/6 murmurs. Lungs:    Improving aeration throughout. Abdomen:   Soft. Non-tender. Non-distended. Bowel sounds positive. No organomegaly or masses. No pain on palpation. Right inguinal tenderness  Extremities:    Peripheral pulses present. No peripheral edema. No ulcers. No cyanosis. No clubbing. Neurologic:    Alert x 3. No focal deficit. Cranial nerves grossly intact. No focal weakness.   Answers all questions accordingly and follows commands. Cross the midline without difficulty. Psych:   Behavior is normal. Mood appears normal. Speech is not rapid and/or pressured. Musculoskeletal:   Spine ROM normal. Muscular strength intact. Gait not assessed. Integumentary:  No rashes  Skin normal color and texture. Genitalia/Breast:  Posey catheter    Medication:  Scheduled Meds:   [START ON 2/21/2023] pantoprazole  40 mg Oral QAM AC    insulin lispro  0-8 Units SubCUTAneous TID WC    insulin lispro  0-4 Units SubCUTAneous Nightly    apixaban  2.5 mg Oral BID    arformoterol tartrate  15 mcg Nebulization BID    budesonide  0.5 mg Nebulization BID    metoprolol tartrate  12.5 mg Oral BID    piperacillin-tazobactam  3,375 mg IntraVENous Q12H    sodium chloride flush  5-40 mL IntraVENous BID    albuterol  2.5 mg Nebulization Q4H    And    ipratropium  0.5 mg Nebulization Q4H     Continuous Infusions:   0.9% NaCl with KCl 20 mEq      dextrose         Objective Data:  Recent Labs     02/17/23 2201 02/18/23  0732 02/19/23  0433   WBC 10.6 12.2* 8.2   RBC 4.35 4.04 3.39*   HGB 14.6 13.2 11.3*   HCT 43.7 41.2 35.2   .5* 102.0* 103.8*   MCH 33.6 32.7 33.3   MCHC 33.4 32.0 32.1   RDW 13.0 13.2 13.2    125* 120*   MPV 13.1* 12.6* 13.1*     Recent Labs     02/17/23 2201 02/18/23  0732 02/19/23  0433   * 137 141   K 4.6 4.2 3.6   CL 85* 93* 98   CO2 28 26 25   BUN 68* 66* 54*   CREATININE 2.1* 1.8* 1.3*   GLUCOSE 236* 195* 228*   CALCIUM 9.2 8.3* 8.0*   PROT 5.5* 5.2* 5.1*   LABALBU 3.2* 2.6* 2.6*   BILITOT 0.8 0.7 0.3   ALKPHOS 41 39 42   AST 22 22 25   ALT 10 9 10     No results found for: TROPONINI       Assessment:  Small bowel obstruction secondary to incarcerated right inguinal hernia status post laparoscopic repair 2/18/2023  Coronary artery disease with status post pacemaker insertion with elevated troponin levels suggesting demand ischemia.   Acute on chronic kidney disease stage IV  Essential hypertension. Hyperlipidemia  Minute atrial fibrillation      Plan: .   Marci Blum appears comfortable today and seems to be improving as expected. We will attempt to wean off nasal cannula oxygen and I have strongly encouraged increased work with the therapy teams. Laboratory values will be repeated this morning. Plans will be for resumption of Eliquis. Renal function is trending in the appropriate direction. IV fluids will be de-escalated as diet is being advanced. She appears to be mildly volume overloaded. I anticipate de-escalation of antibiotics in the next 24 hours. I have personally discussed the case with the general surgery team this morning and the patient's clinical condition seems to have improved since their examination earlier today. Neurologic status will be monitored very closely and I fully anticipate the need for temporary skilled nursing facility placement upon discharge. More than 50% of my  time was spent at the bedside counseling/coordinating care with the patient and/or family with face to face contact. This time was spent reviewing notes and laboratory data as well as instructing and counseling the patient. Time I spent with the family or surrogate(s) is included only if the patient was incapable of providing the necessary information or participating in medical decisions. I also discussed the differential diagnosis and all of the proposed management plans with the patient and individuals accompanying the patient.     Jena Palomino DO, F.A.C.O.I.  2/20/2023  9:41 AM

## 2023-02-20 NOTE — PROGRESS NOTES
Pharmacist Review and Automatic Dose Adjustment of Extended Antibiotic Infusions    The reviewing pharmacist has made an adjustment to the ordered zosyn dose per the approved [facility] protocol and table as identified below. Dr. Narendra Kendrick DO    Priya Cameron is a 80 y.o. female. Renal Function Assessment:    Date Body Weight IBW  Adjusted BW SCr  CrCl Dialysis status   2/20/2023 112 lb 1.6 oz (50.8 kg)  Ideal body weight: 57 kg (125 lb 10.6 oz) Serum creatinine: 1.3 mg/dL (H) 02/19/23 0433  Estimated creatinine clearance: 22 mL/min (A) N/a     Estimated Creatinine Clearance: 22 mL/min (A) (based on SCr of 1.3 mg/dL (H)). Recent Labs     02/17/23  2201 02/18/23  0732 02/19/23  0433   CREATININE 2.1* 1.8* 1.3*       Height:   Ht Readings from Last 1 Encounters:   02/18/23 5' 5\" (1.651 m)     Weight:  Wt Readings from Last 1 Encounters:   02/19/23 112 lb 1.6 oz (50.8 kg)             Plan: Based upon the patient's weight and renal function, the ordered zosyn dose of 3375 mg every 12 hours has been changed/converted to 3375 mg every 8 hours.       Thank you,  Juan Ramon Perea, [facility]  2/20/2023   11:14 AM

## 2023-02-20 NOTE — PROGRESS NOTES
[address] [facility] CTR  [facility] [place]        Date:2023                                                  Patient Name: Phoenix Saenz    MRN: [id number]    : 3/5/1929    Room: [facility]-      Evaluating OT: Janet Pinto OTR/L; [id number]     Referring Provider and Specific Provider Orders/Date:      23  OT eval and treat  Start:  23,   End:  23,   ONE TIME,   Standing Count:  1 Occurrences,   R         Elle Yen, DO      Placement Recommendation: Subacute        Diagnosis:   1. Pneumonia of left lung due to infectious organism, unspecified part of lung    2. MAGALY (acute kidney injury) ([facility] [address]    3. Elevated lactic acid level    4. Hyperglycemia    5. SBO (small bowel obstruction) ([facility] [address]    6.  Acute respiratory failure with hypoxia [facility])         Surgery:  incarcerated right inguinal hernia status post laparoscopic repair on 2023      Pertinent Medical History:       Past Medical History:   Diagnosis Date    GERD (gastroesophageal reflux disease)     Hearing impaired     Hyperlipidemia     Hypertension     Small bowel obstruction (HCC)     Thyroid disease          Past Surgical History:   Procedure Laterality Date    APPENDECTOMY      CATARACT REMOVAL WITH IMPLANT Right 04/15/2014    CATARACT REMOVAL WITH IMPLANT Left 11/15/2016    COLECTOMY N/A 2023    LAPAROSCOPIC INCARCERATED STRANGULATED RIGHT INGUINAL HERNIA REPAIR WITH MESH performed by Narcisa Gonzalez MD at [address] [place] [address] ([address] [place] [address])      LAPAROTOMY  2023    release sbo incarcerated strangulated right inguinal hernia with mesh repair    OTHER SURGICAL HISTORY  2012    RIGHT HEMITHYROIDECTOMY    PACEMAKER INSERTION      PACEMAKER PLACEMENT      [employer]       Precautions:  Fall Risk,  incarcerated right inguinal hernia status post laparoscopic repair on 2023, labile, confusion, pt hallucinating - see babies in the room     Comment: 3 adult children present in the room and states she is normally \"sharper than a tack,\" and report this is not like her. Assessment of current deficits:     [x] Functional mobility  [x]ADLs  [x] Strength               [x]Cognition    [x] Functional transfers   [x] IADLs         [x] Safety Awareness   [x]Endurance    [] Fine Coordination              [x] Balance      [] Vision/perception   []Sensation     []Gross Motor Coordination  [] ROM  [] Delirium                   [] Motor Control     OT PLAN OF CARE   OT POC based on physician orders, patient diagnosis and results of clinical assessment    Frequency/Duration 1-3 days/wk for 2 weeks PRN     Specific OT Treatment Interventions to include:   * Instruction/training on adapted ADL techniques and AE recommendations to increase functional independence within precautions       * Training on energy conservation strategies, correct breathing pattern and techniques to improve independence/tolerance for self-care routine  * Functional transfer/mobility training/DME recommendations for increased independence, safety, and fall prevention  * Patient/Family education to increase follow through with safety techniques and functional independence  * Recommendation of environmental modifications for increased safety with functional transfers/mobility and ADLs  * Therapeutic exercise to improve motor endurance, ROM, and functional strength for ADLs/functional transfers  * Therapeutic activities to facilitate/challenge dynamic balance, stand tolerance for increased safety and independence with ADLs  * Positioning to improve skin integrity, interaction with environment and functional independence    Recommended Adaptive Equipment: TBD at rehab      Home Living: with son Lanre Coulter); single family home, 1 story, no steps to enter, tub shower.        Equipment owned: wheeled walker, bedside commode, shower chair, suction cup grab bar, rail clamped onto tub    Prior Level of Function: Independent with ADLs and IADLs; ambulated with wheeled walker    Driving: no  Occupation: retired    Pain Level: pt states she is in [place] little pain\"; Nursing notified. Cognition: A&O: 1/4; Follows 1 step directions   Memory: poor   Sequencing: poor   Problem solving: poor   Judgement/safety: poor    [facility]   AM-PAC Daily Activity - Inpatient   How much help is needed for putting on and taking off regular lower body clothing?: A Lot  How much help is needed for bathing (which includes washing, rinsing, drying)?: A Lot  How much help is needed for toileting (which includes using toilet, bedpan, or urinal)?: A Lot  How much help is needed for putting on and taking off regular upper body clothing?: A Lot  How much help is needed for taking care of personal grooming?: A Little  How much help for eating meals?: None  AM-[facility] Inpatient Daily Activity Raw Score: 15  AM-PAC Inpatient ADL T-Scale Score : 34.69  ADL Inpatient CMS 0-100% Score: 56.46  ADL Inpatient CMS G-Code Modifier : CK     Functional Assessment:    Initial Eval Status  Date: 2/20/23 Treatment Status  Date: STGs = LTGs  Time frame: 10-14 days   Feeding Independent   Independent    Grooming Minimal Assist   Independent    UB Dressing Maximal Assist to [facility] and don hospital gown while seated in bedside chair. Supervision    LB Dressing Maximal Assist   Supervision    Bathing Maximal Assist  Supervision    Toileting Maximal Assist   Supervision    Bed Mobility  Supine to sit: N/T as pt was up in chair   Sit to supine: N/T as pt was up in chair   Supine to sit: Independent   Sit to supine: Independent    Functional Transfers Moderate Assist from bedside chair to wheeled walker  Transfer training with verbal cues for hand placement throughout session to improve safety.    Supervision    Functional Mobility Minimal assist progressing to moderate assist with wheeled walker to improve balance for household distance (20 feet x 2), verbal cues for walker sequence and safety. Modified [place]    Balance Sitting:     Static: good     Dynamic: fair   Standing: fair  with wheeled walker     Activity Tolerance Fair   good    Visual/  Perceptual Glasses: yes                 Hand Dominance: right      AROM (PROM) Strength Additional Info:    RUE  WFL 3/5 good  and wfl FMC/dexterity noted during ADL tasks     LUE WFL 3/5 good  and wfl FMC/dexterity noted during ADL tasks       Hearing: WFL   Sensation:  No c/o numbness or tingling  Tone: WFL   Edema: no    Comments: Upon arrival the patient was seated in bedside chair. At end of session, patient was returned to bedside chair with warm blanket applied. Call light and phone within reach, all lines and tubes intact. Two sons and one daughter present. Overall patient demonstrated decreased independence and safety during completion of ADL/functional transfer/mobility tasks. Pt would benefit from continued skilled OT to increase safety and independence with completion of ADL/IADL tasks for functional independence and quality of life. Treatment: OT treatment provided this date includes:   Instruction/training on safety and adapted techniques for completion of ADLs   Instruction/training on safe functional mobility/transfer techniques   Instruction/training on energy conservation/work simplification for completion of ADLs    Rehab Potential: Good for established goals. Patient / Family Goal: go to rehab       Patient and/or family were instructed on functional diagnosis, prognosis/goals and OT plan of care. Demonstrated good understanding.      Eval Complexity: Low    Time In: 2:25pm  Time Out: 3:11pm    Total Treatment Time: 31      Min Units   OT Eval Low 97165  X  1    OT Eval Medium 18334      OT Eval High 75998      OT Re-Eval 85750            ADL/Self Care 09869  10 1    Therapeutic Activities 22214  21  1    Therapeutic Ex 51400       Orthotic Management 01303       Manual 69821     Neuro Re-Ed 00673       Non-Billable Time        Evaluation Time additionally includes thorough review of current medical information, gathering information on past medical history/social history and prior level of function, interpretation of standardized testing/informal observation of tasks, assessment of data and development of plan of care and goals.         Evaluating OT: Helen Clark OTR/L; [id number]

## 2023-02-20 NOTE — PROGRESS NOTES
Cardiology  Progress Note      SUBJECTIVE:  patient was lying semi-upright in bed when I came to see her. She looked weak and somewhat lethargic but no acute distress.     Current Inpatient Medications  Current Facility-Administered Medications: [START ON 2/21/2023] pantoprazole (PROTONIX) tablet 40 mg, 40 mg, Oral, QAM AC  0.9% NaCl with KCl 20 mEq infusion, , IntraVENous, Continuous  glucose chewable tablet 16 g, 4 tablet, Oral, PRN  dextrose bolus 10% 125 mL, 125 mL, IntraVENous, PRN **OR** dextrose bolus 10% 250 mL, 250 mL, IntraVENous, PRN  glucagon (rDNA) injection 1 mg, 1 mg, SubCUTAneous, PRN  dextrose 10 % infusion, , IntraVENous, Continuous PRN  insulin lispro (HUMALOG) injection vial 0-8 Units, 0-8 Units, SubCUTAneous, TID WC  insulin lispro (HUMALOG) injection vial 0-4 Units, 0-4 Units, SubCUTAneous, Nightly  sodium phosphate 8.13 mmol in sodium chloride 0.9 % 250 mL IVPB, 0.16 mmol/kg, IntraVENous, PRN **OR** sodium phosphate 16.26 mmol in sodium chloride 0.9 % 250 mL IVPB, 0.32 mmol/kg, IntraVENous, PRN  magnesium sulfate 2000 mg in 50 mL IVPB premix, 2,000 mg, IntraVENous, PRN  potassium chloride (KLOR-CON M) extended release tablet 40 mEq, 40 mEq, Oral, PRN **OR** potassium bicarb-citric acid (EFFER-K) effervescent tablet 40 mEq, 40 mEq, Oral, PRN **OR** potassium chloride 10 mEq/100 mL IVPB (Peripheral Line), 10 mEq, IntraVENous, PRN  apixaban (ELIQUIS) tablet 2.5 mg, 2.5 mg, Oral, BID  piperacillin-tazobactam (ZOSYN) 3,375 mg in sodium chloride 0.9 % 50 mL IVPB ([id number]), 3,375 mg, IntraVENous, Q8H **FOLLOWED BY** [DISCONTINUED] piperacillin-tazobactam (ZOSYN) 3,375 mg in sodium chloride 0.9 % 50 mL IVPB ([id number]), 3,375 mg, IntraVENous, Q12H  docusate sodium (COLACE) capsule 100 mg, 100 mg, Oral, Daily  senna (SENOKOT) tablet 8.6 mg, 1 tablet, Oral, Nightly  bisacodyl (DULCOLAX) suppository 10 mg, 10 mg, Rectal, Once  arformoterol tartrate (BROVANA) nebulizer solution 15 mcg, 15 mcg, Nebulization, BID  budesonide (PULMICORT) nebulizer suspension 500 mcg, 0.5 mg, Nebulization, BID  metoprolol tartrate (LOPRESSOR) tablet 12.5 mg, 12.5 mg, Oral, BID  sodium chloride flush 0.9 % injection 5-40 mL, 5-40 mL, IntraVENous, BID  albuterol (PROVENTIL) nebulizer solution 2.5 mg, 2.5 mg, Nebulization, Q4H **AND** ipratropium (ATROVENT) 0.02 % nebulizer solution 0.5 mg, 0.5 mg, Nebulization, Q4H      Physical  VITALS:  BP (!) 163/56   Pulse 70   Temp 98.6 °F (37 °C) (Axillary)   Resp 22   Ht 5' 5\" (1.651 m)   Wt 112 lb 1.6 oz (50.8 kg)   SpO2 96%   BMI 18.65 kg/m²   CURRENT TEMPERATURE:  Temp: 98.6 °F (37 °C)  CONSTITUTIONAL: No acute distress. EYES: Vision is intact. ENT: No sore throat. No ear drainage. NECK: No JVD. BACK: Symmetric. LUNGS:  diminished breath sounds left base  CARDIOVASCULAR:  normal S1 and S2, no S3, and murmurs include systolic murmur I/VI located at left sternal border without radiation  ABDOMEN:  Mildly distended  NEUROLOGIC: No focal deficits. EXTREMITIES: No edema cyanosis or clubbing. DATA:        Cardiology Labs:  BMP:    Lab Results   Component Value Date/Time     02/19/2023 04:33 AM    K 3.6 02/19/2023 04:33 AM    K 4.6 02/17/2023 10:01 PM    CL 98 02/19/2023 04:33 AM    CO2 25 02/19/2023 04:33 AM    BUN 54 02/19/2023 04:33 AM     CBC:    Lab Results   Component Value Date/Time    WBC 8.2 02/19/2023 04:33 AM    RBC 3.39 02/19/2023 04:33 AM    HGB 11.3 02/19/2023 04:33 AM    HCT 35.2 02/19/2023 04:33 AM    .8 02/19/2023 04:33 AM    RDW 13.2 02/19/2023 04:33 AM     02/19/2023 04:33 AM     PT/INR:  No results found for: PTINR  TROPONIN:  No components found for: TROP    ASSESSMENT    1.elevation of high sensitivity troponin. Patient had downtrending troponin elevation starting at 167 going down to 120 and then to 59. This is mostly demand ischemia from her GI symptoms with type II non-STEMI. Patient is 72-year-old lady.   EKG is nondiagnostic due to the presence of paced rhythm. No need for coronary workup at this point. 2.sick sinus syndrome / paroxysmal atrial fibrillation. Status post permanent pacemaker in the past.  On Eliquis. Patient was in AV paced rhythm in my office last month. EKG on admission is consistent with atrial fibrillation with ventricular paced rhythm. Recommend pacemaker interrogation. 3.small bowel obstruction. Clinically better. NG tube was pulled out.

## 2023-02-20 NOTE — CARE COORDINATION
Ss note: 2/20/20231:23 PM Neg covid on 2-17-23. Will need Rapid Covid for SNF. Awaiting therapy evals. Follow up to room, reviewed SNF list with dtr Duana Habermann, choices are 1. [facility] 3 [facility] 4. [facility]. Referral made to [facility] with [facility] awaiting response. Requires PRECERT, Hens and signed JANE.  STEPHAN Baht

## 2023-02-20 NOTE — PLAN OF CARE
Problem: Discharge Planning  Goal: Discharge to home or other facility with appropriate resources  2/19/2023 2359 by Crista Florez RN  Outcome: Progressing     Problem: ABCDS Injury Assessment  Goal: Absence of physical injury  2/19/2023 2359 by Crista Florez RN  Outcome: Progressing     Problem: Skin/Tissue Integrity  Goal: Absence of new skin breakdown  Description: 1. Monitor for areas of redness and/or skin breakdown  2. Assess vascular access sites hourly  3. Every 4-6 hours minimum:  Change oxygen saturation probe site  4. Every 4-6 hours:  If on nasal continuous positive airway pressure, respiratory therapy assess nares and determine need for appliance change or resting period.   2/19/2023 2359 by Crista Florez RN  Outcome: Progressing     Problem: Safety - Adult  Goal: Free from fall injury  2/19/2023 2359 by Crista Florez RN  Outcome: Progressing     Problem: Pain  Goal: Verbalizes/displays adequate comfort level or baseline comfort level  2/19/2023 2359 by Crista Florez RN  Outcome: Progressing     Problem: Skin/Tissue Integrity - Adult  Goal: Skin integrity remains intact  2/19/2023 2359 by Crista Florez RN  Outcome: Progressing     Problem: Skin/Tissue Integrity - Adult  Goal: Incisions, wounds, or drain sites healing without S/S of infection  2/19/2023 2359 by Crista Florez RN  Outcome: Progressing

## 2023-02-20 NOTE — CARE COORDINATION
Case Management Assessment  Initial Evaluation    Date/Time of Evaluation: 2/20/2023 12:31 PM  Assessment Completed by: STEPHAN Marte    If patient is discharged prior to next notation, then this note serves as note for discharge by case management. Patient Name: Eliane Sharma                   YOB: 1929  Diagnosis: SBO (small bowel obstruction) ([facility] [address] [K56.609]  Hyperglycemia [R73.9]  MAGALY (acute kidney injury) ([facility] [address] [N17.9]  Acute respiratory failure with hypoxia (HCC) [J96.01]  Elevated lactic acid level [R79.89]  Pneumonia of left lung due to infectious organism, unspecified part of lung [J18.9]                   Date / Time: 2/17/2023  8:36 PM    Patient Admission Status: Inpatient   Readmission Risk (Low < 19, Mod (19-27), High > 27): Readmission Risk Score: 14.7    Current PCP: Nichole Garcia MD  PCP verified by CM? Yes    Chart Reviewed: Yes        History Provided by: Child/Family (son Britton Rodrigues)  Patient Orientation: Alert and Oriented, Person, Place    Patient Cognition: Alert      Advance Directives:      Code Status: Full Code   Patient's Primary Decision Maker is: Legal Next of Kin    Primary Decision MakerVincent Zoeyeliu  Child - [phone]    Primary Decision Maker: Nadir Em Child - [phone]    Discharge Planning:    Patient lives with: Family Members Type of Home: House  Primary Care Giver: Family (resides with son Diane Alanis, he does homemaking chores)    Patient Support Systems include: Children     Current services prior to admission: None            Current DME: has ww        ADLS  Prior functional level: Other (see comment) (son charles pt was using a ww PTA)  Current functional level: Other (see comment) (therapy ordered. )    PT AM-PAC:   /24  OT AM-PAC:   /24    Family can provide assistance at DC: Yes  Would you like Case Management to discuss the discharge plan with any other family members/significant others, and if so, who?  Yes (dtr Cele Sandhu)  Plans to Return to Present Housing: Other (see comment) (consult noted for SNF, family has list)  Other Identified Issues/Barriers to RETURNING to current housing: anticipate SNF, family has list.     Potential Assistance needed at discharge: N/A            Potential DME:    Patient expects to discharge to: [facility] for transportation at discharge:      Financial    Payor: Zenia Goode / Plan: Ricki Orta ESSENTIAL/PLUS / Product Type: *No Product type* /     Does insurance require precert for SNF:  YES    Potential assistance Purchasing Medications:    Meds-to-Beds request:        [id number] - [place] - [address] [address]  Phone: [phone] Fax: [phone]      Notes:    SS note: 2/20/2023 12:35 PM Neg covid on 2-17-23. Will need RAPID COVID for SNF. Consult noted for \"fully anticipate the need for SNF. \" Therapy has been ordered. Met with pt, her son Genia Jessica present and stepped outside of room with sw for assessment questions. Genia Jessica relays pt resides with her other son Clayton Matthews in [place]. PTA pt uses a ww. Pt follows with Dr. Cheyanne Christiansen. Genia Jessica charles pt does not have a hx fo HHC or SNF. Son does homemaking chores, pt does not drive. Utilizes [facility] in [place]. Son relays pts dtr Jordyn will be presenting to hospital shortly and to follow up with her on SNF choices, he has the SNF list. Requires PRECERT for placement, rapid covid, Hens and signed JANE. SW will follow. STEPHAN Natarajan    The Plan for Transition of Care is related to the following treatment goals of SBO (small bowel obstruction) ([address] [K56.609]  Hyperglycemia [R73.9]  MAGALY (acute kidney injury) ([address] [N17.9]  Acute respiratory failure with hypoxia (HCC) [J96.01]  Elevated lactic acid level [R79.89]  Pneumonia of left lung due to infectious organism, unspecified part of lung [J18.9]    The Patient and/or Patient Representative Agree with the Discharge Plan?       Sameer Solis, LSW  Case Management Department

## 2023-02-20 NOTE — PROGRESS NOTES
GENERAL SURGERY  DAILY PROGRESS NOTE  2/20/2023  Chief Complaint   Patient presents with    Emesis     [place] and vomiting x 3 days; dark brown vomit per family after trying chicken broth today; denies fever or chills; no diarrhea; went to doctors yesterday and sent home with dx of stomach bug  Not diabetic,          Subjective:  Seems to be tolerating her diet. 1 recorded bowel movement. Some new onset dysarthria this morning. Objective:  BP (!) 129/49   Pulse 70   Temp 98.9 °F (37.2 °C) (Oral)   Resp 20   Ht 5' 5\" (1.651 m)   Wt 112 lb 1.6 oz (50.8 kg)   SpO2 91%   BMI 18.65 kg/m²     GENERAL:  Laying in bed, awake, follows commands  HEAD: Normocephalic, atraumatic  EYES: No sclera icterus, pupils equal  LUNGS:  No increased work of breathing  CARDIOVASCULAR:  regular rate  ABDOMEN:  Soft, appropriately tender, nondistended, incisions clean dry and intact  EXTREMITIES: No edema or swelling  SKIN: Warm and dry, no rashes or lesions  Neuro: Moves all 4 extremities to command with 5 out of 5 strength. GCS 14. Pupils equal round reactive. New onset dysarthria    Assessment/Plan:  80 y.o. female admitted with an incarcerated right inguinal hernia status post laparoscopic repair on 2/18/2023    Continue diet  Discussed with internal medicine team concerns for new onset dysarthria.   Dr. Rk Correa will evaluate and determine whether patient needs further work-up    Electronically signed by July Wang MD on 2/20/2023 at 7:03 AM      As above  Gen diet  Bowel regimen  Out of bed  PT/OT  [facility] hospital delirium  Family at bedside and updated    Johnny Dudley MD, MS  Minimally Invasive and Bariatric Surgery  [phone] (p)  2/20/2023  12:15 PM

## 2023-02-21 LAB
ALBUMIN SERPL-MCNC: 2.3 G/DL (ref 3.5–5.2)
ALP BLD-CCNC: 47 U/L (ref 35–104)
ALT SERPL-CCNC: 10 U/L (ref 0–32)
ANION GAP SERPL CALCULATED.3IONS-SCNC: 10 MMOL/L (ref 7–16)
AST SERPL-CCNC: 19 U/L (ref 0–31)
BASOPHILS ABSOLUTE: 0 E9/L (ref 0–0.2)
BASOPHILS RELATIVE PERCENT: 0.1 % (ref 0–2)
BILIRUB SERPL-MCNC: 0.4 MG/DL (ref 0–1.2)
BUN BLDV-MCNC: 35 MG/DL (ref 6–23)
BURR CELLS: ABNORMAL
CALCIUM SERPL-MCNC: 7.8 MG/DL (ref 8.6–10.2)
CHLORIDE BLD-SCNC: 103 MMOL/L (ref 98–107)
CO2: 29 MMOL/L (ref 22–29)
CREAT SERPL-MCNC: 0.8 MG/DL (ref 0.5–1)
EKG ATRIAL RATE: 70 BPM
EKG Q-T INTERVAL: 514 MS
EKG QRS DURATION: 166 MS
EKG QTC CALCULATION (BAZETT): 555 MS
EKG R AXIS: -72 DEGREES
EKG T AXIS: 91 DEGREES
EKG VENTRICULAR RATE: 70 BPM
EOSINOPHILS ABSOLUTE: 0 E9/L (ref 0.05–0.5)
EOSINOPHILS RELATIVE PERCENT: 0 % (ref 0–6)
GFR SERPL CREATININE-BSD FRML MDRD: >60 ML/MIN/1.73
GLUCOSE BLD-MCNC: 146 MG/DL (ref 74–99)
HCT VFR BLD CALC: 37 % (ref 34–48)
HEMOGLOBIN: 12.1 G/DL (ref 11.5–15.5)
LYMPHOCYTES ABSOLUTE: 0.66 E9/L (ref 1.5–4)
LYMPHOCYTES RELATIVE PERCENT: 7.8 % (ref 20–42)
MAGNESIUM: 1.8 MG/DL (ref 1.6–2.6)
MCH RBC QN AUTO: 34.9 PG (ref 26–35)
MCHC RBC AUTO-ENTMCNC: 32.7 % (ref 32–34.5)
MCV RBC AUTO: 106.6 FL (ref 80–99.9)
METER GLUCOSE: 125 MG/DL (ref 74–99)
METER GLUCOSE: 173 MG/DL (ref 74–99)
METER GLUCOSE: 173 MG/DL (ref 74–99)
METER GLUCOSE: 194 MG/DL (ref 74–99)
MONOCYTES ABSOLUTE: 0.5 E9/L (ref 0.1–0.95)
MONOCYTES RELATIVE PERCENT: 6.1 % (ref 2–12)
NEUTROPHILS ABSOLUTE: 7.14 E9/L (ref 1.8–7.3)
NEUTROPHILS RELATIVE PERCENT: 86.1 % (ref 43–80)
PDW BLD-RTO: 13.7 FL (ref 11.5–15)
PHOSPHORUS: 1.6 MG/DL (ref 2.5–4.5)
PLATELET # BLD: 139 E9/L (ref 130–450)
PMV BLD AUTO: 12.1 FL (ref 7–12)
POIKILOCYTES: ABNORMAL
POTASSIUM SERPL-SCNC: 3.8 MMOL/L (ref 3.5–5)
RBC # BLD: 3.47 E12/L (ref 3.5–5.5)
SARS-COV-2, NAAT: NOT DETECTED
SODIUM BLD-SCNC: 142 MMOL/L (ref 132–146)
TOTAL PROTEIN: 4.8 G/DL (ref 6.4–8.3)
WBC # BLD: 8.3 E9/L (ref 4.5–11.5)

## 2023-02-21 PROCEDURE — 2500000003 HC RX 250 WO HCPCS: Performed by: INTERNAL MEDICINE

## 2023-02-21 PROCEDURE — 82962 GLUCOSE BLOOD TEST: CPT

## 2023-02-21 PROCEDURE — 85025 COMPLETE CBC W/AUTO DIFF WBC: CPT

## 2023-02-21 PROCEDURE — 36415 COLL VENOUS BLD VENIPUNCTURE: CPT

## 2023-02-21 PROCEDURE — 2580000003 HC RX 258: Performed by: INTERNAL MEDICINE

## 2023-02-21 PROCEDURE — 94640 AIRWAY INHALATION TREATMENT: CPT

## 2023-02-21 PROCEDURE — 6370000000 HC RX 637 (ALT 250 FOR IP): Performed by: SURGERY

## 2023-02-21 PROCEDURE — 97530 THERAPEUTIC ACTIVITIES: CPT | Performed by: PHYSICAL THERAPIST

## 2023-02-21 PROCEDURE — 80053 COMPREHEN METABOLIC PANEL: CPT

## 2023-02-21 PROCEDURE — 2700000000 HC OXYGEN THERAPY PER DAY

## 2023-02-21 PROCEDURE — 84100 ASSAY OF PHOSPHORUS: CPT

## 2023-02-21 PROCEDURE — 6360000002 HC RX W HCPCS: Performed by: INTERNAL MEDICINE

## 2023-02-21 PROCEDURE — 2580000003 HC RX 258: Performed by: SURGERY

## 2023-02-21 PROCEDURE — 6360000002 HC RX W HCPCS: Performed by: SURGERY

## 2023-02-21 PROCEDURE — 83735 ASSAY OF MAGNESIUM: CPT

## 2023-02-21 PROCEDURE — 1200000000 HC SEMI PRIVATE

## 2023-02-21 PROCEDURE — 97161 PT EVAL LOW COMPLEX 20 MIN: CPT | Performed by: PHYSICAL THERAPIST

## 2023-02-21 PROCEDURE — 87635 SARS-COV-2 COVID-19 AMP PRB: CPT

## 2023-02-21 PROCEDURE — 6370000000 HC RX 637 (ALT 250 FOR IP): Performed by: INTERNAL MEDICINE

## 2023-02-21 RX ORDER — AMOXICILLIN AND CLAVULANATE POTASSIUM 875; 125 MG/1; MG/1
1 TABLET, FILM COATED ORAL EVERY 12 HOURS SCHEDULED
Status: DISCONTINUED | OUTPATIENT
Start: 2023-02-21 | End: 2023-02-23 | Stop reason: HOSPADM

## 2023-02-21 RX ADMIN — APIXABAN 2.5 MG: 5 TABLET, FILM COATED ORAL at 10:26

## 2023-02-21 RX ADMIN — BUDESONIDE 500 MCG: 0.5 INHALANT RESPIRATORY (INHALATION) at 06:27

## 2023-02-21 RX ADMIN — ARFORMOTEROL TARTRATE 15 MCG: 15 SOLUTION RESPIRATORY (INHALATION) at 06:27

## 2023-02-21 RX ADMIN — IPRATROPIUM BROMIDE 0.5 MG: 0.5 SOLUTION RESPIRATORY (INHALATION) at 14:12

## 2023-02-21 RX ADMIN — Medication 10 ML: at 10:28

## 2023-02-21 RX ADMIN — ARFORMOTEROL TARTRATE 15 MCG: 15 SOLUTION RESPIRATORY (INHALATION) at 18:06

## 2023-02-21 RX ADMIN — IPRATROPIUM BROMIDE 0.5 MG: 0.5 SOLUTION RESPIRATORY (INHALATION) at 06:27

## 2023-02-21 RX ADMIN — AMOXICILLIN AND CLAVULANATE POTASSIUM 1 TABLET: 875; 125 TABLET, FILM COATED ORAL at 10:35

## 2023-02-21 RX ADMIN — Medication 10 ML: at 20:10

## 2023-02-21 RX ADMIN — DOCUSATE SODIUM 100 MG: 100 CAPSULE, LIQUID FILLED ORAL at 10:26

## 2023-02-21 RX ADMIN — ALBUTEROL SULFATE 2.5 MG: 2.5 SOLUTION RESPIRATORY (INHALATION) at 21:30

## 2023-02-21 RX ADMIN — SODIUM PHOSPHATE, MONOBASIC, MONOHYDRATE AND SODIUM PHOSPHATE, DIBASIC, ANHYDROUS 8.13 MMOL: 276; 142 INJECTION, SOLUTION INTRAVENOUS at 10:33

## 2023-02-21 RX ADMIN — ALBUTEROL SULFATE 2.5 MG: 2.5 SOLUTION RESPIRATORY (INHALATION) at 18:06

## 2023-02-21 RX ADMIN — ALBUTEROL SULFATE 2.5 MG: 2.5 SOLUTION RESPIRATORY (INHALATION) at 14:12

## 2023-02-21 RX ADMIN — SENNOSIDES 8.6 MG: 8.6 TABLET, FILM COATED ORAL at 20:02

## 2023-02-21 RX ADMIN — PIPERACILLIN SODIUM AND TAZOBACTAM SODIUM 3375 MG: 3; 375 INJECTION, POWDER, FOR SOLUTION INTRAVENOUS at 05:49

## 2023-02-21 RX ADMIN — BUDESONIDE 500 MCG: 0.5 INHALANT RESPIRATORY (INHALATION) at 18:06

## 2023-02-21 RX ADMIN — IPRATROPIUM BROMIDE 0.5 MG: 0.5 SOLUTION RESPIRATORY (INHALATION) at 21:30

## 2023-02-21 RX ADMIN — AMOXICILLIN AND CLAVULANATE POTASSIUM 1 TABLET: 875; 125 TABLET, FILM COATED ORAL at 20:02

## 2023-02-21 RX ADMIN — ALBUTEROL SULFATE 2.5 MG: 2.5 SOLUTION RESPIRATORY (INHALATION) at 06:27

## 2023-02-21 RX ADMIN — METOPROLOL TARTRATE 12.5 MG: 25 TABLET, FILM COATED ORAL at 10:28

## 2023-02-21 RX ADMIN — IPRATROPIUM BROMIDE 0.5 MG: 0.5 SOLUTION RESPIRATORY (INHALATION) at 18:06

## 2023-02-21 RX ADMIN — APIXABAN 2.5 MG: 5 TABLET, FILM COATED ORAL at 20:02

## 2023-02-21 RX ADMIN — PANTOPRAZOLE SODIUM 40 MG: 40 TABLET, DELAYED RELEASE ORAL at 05:37

## 2023-02-21 RX ADMIN — METOPROLOL TARTRATE 12.5 MG: 25 TABLET, FILM COATED ORAL at 20:02

## 2023-02-21 ASSESSMENT — PAIN SCALES - GENERAL
PAINLEVEL_OUTOF10: 0

## 2023-02-21 NOTE — DISCHARGE INSTR - COC
Continuity of Care Form    Patient Name: Sarah Quijano   :  3/5/1929  MRN:  [id number]    [facility] date:  2023  Discharge date:  23    Code Status Order: Full Code   Advance Directives:     Admitting Physician:  Mona Matos DO  PCP: Raffi Green MD    Discharging Nurse: [facility] Unit/Room#: 5990/7219-78  Discharging Unit Phone Number: [phone]    Emergency Contact:   Extended Emergency Contact Information  Primary Emergency Contact: Fredrick Harrington 11 Phone: [phone]  Relation: Child  Secondary Emergency Contact: [address] [place] [address] Phone: [phone]  Relation: Child   needed? No    Past Surgical History:  Past Surgical History:   Procedure Laterality Date    APPENDECTOMY      CATARACT REMOVAL WITH IMPLANT Right 04/15/2014    CATARACT REMOVAL WITH IMPLANT Left 11/15/2016    COLECTOMY N/A 2023    LAPAROSCOPIC INCARCERATED STRANGULATED RIGHT INGUINAL HERNIA REPAIR WITH MESH performed by Sylvia Corral MD at [address] ([facility])      LAPAROTOMY  2023    release sbo incarcerated strangulated right inguinal hernia with mesh repair    OTHER SURGICAL HISTORY  2012    RIGHT HEMITHYROIDECTOMY    [facility]        Immunization History: There is no immunization history on file for this patient.     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    Right cataract H26.9    Left cataract H26.9    SBO (small bowel obstruction) ([facility] [address] K56.609    Pneumonia of left lung due to infectious organism J18.9    Incarcerated inguinal hernia, unilateral K40.30       Isolation/Infection:   Isolation            No Isolation          Patient Infection Status       Infection Onset Added Last Indicated Last Indicated By Review Planned Expiration Resolved Resolved By    None active    Resolved    COVID-19 (Rule Out) 23 COVID-19, Rapid (Ordered)   02/17/23 Rule-Out Test Resulted            Nurse Assessment:  Last Vital Signs: BP (!) 171/73   Pulse 70   Temp 98 °F (36.7 °C) (Oral)   Resp 18   Ht 5' 5\" (1.651 m)   Wt 112 lb 1.6 oz (50.8 kg)   SpO2 95%   BMI 18.65 kg/m²     Last documented pain score (0-10 scale): Pain Level: 0  Last Weight:   Wt Readings from Last 1 Encounters:   02/19/23 112 lb 1.6 oz (50.8 kg)     Mental Status:  oriented    IV Access:  - None    Nursing Mobility/ADLs:  Walking   Assisted  Transfer  Assisted  Bathing  Assisted  Dressing  Assisted  Toileting  Assisted  Feeding  Assisted  Med Admin  Assisted  Med Delivery   crushed    Wound Care Documentation and Therapy:  Incision 02/18/23 Abdomen Lower;Medial (Active)   Dressing Status Clean;Dry; Intact 02/20/23 2300   Dressing/Treatment Skin glue 02/20/23 2300   Closure Sutures;Surgical glue 02/20/23 2300   Margins Approximated 02/20/23 2300   Drainage Amount None 02/20/23 2300   Number of days: 3        Elimination:  Continence: Bowel: Yes  Bladder: Yes  Urinary Catheter: None   Colostomy/Ileostomy/Ileal Conduit: No       Date of Last BM: 2/22/23    Intake/Output Summary (Last 24 hours) at 2/21/2023 0949  Last data filed at 2/20/2023 1003  Gross per 24 hour   Intake 10 ml   Output --   Net 10 ml     I/O last 3 completed shifts: In: 10 [I.V.:10]  Out: 1100 [Urine:1100]    Safety Concerns: At Risk for Falls    Impairments/Disabilities:      None    Nutrition Therapy:  Current Nutrition Therapy:   - Oral Diet:  General    Routes of Feeding: Oral  Liquids: No Restrictions  Daily Fluid Restriction: no  Last Modified Barium Swallow with Video (Video Swallowing Test): not done    Treatments at the Time of Hospital Discharge:   Respiratory Treatments: Albuterol 2.5mg Q4, Atrovent . 5mg Q4, Brovana 15 mcg bid, Pulmicort 500 mcg bid  Oxygen Therapy:  is not on home oxygen therapy.   Ventilator:    - No ventilator support    Rehab Therapies: Physical Therapy  Weight Bearing Status/Restrictions: No weight bearing restrictions  Other Medical Equipment (for information only, NOT a DME order):  walker  Other Treatments: NA    Patient's personal belongings (please select all that are sent with patient):  Hearing Aides bilateral    RN SIGNATURE:  Electronically signed by Lyndsay Mccloud RN on 2/23/23 at 12:57 PM EST    CASE MANAGEMENT/SOCIAL WORK SECTION    Inpatient Status Date: 2/17/23    Readmission Risk Assessment Score:  Readmission Risk              Risk of Unplanned Readmission:  17           Discharging to Facility/ Agency   Name:  [address] [place] [address]  Address: [address] [place] [address] rd Son's: [phone]  Fax: [phone]    Dialysis Facility (if applicable)   Name:  Address:  Dialysis Schedule:  Phone:  Fax:    / signature: Electronically signed by Norwood Habermann, LSW on 2/21/23 at 12:20 PM EST    PHYSICIAN SECTION    Prognosis: {Prognosis:4845436440}    Condition at Discharge: [address] [place] [address] Patient Condition:[id number]}    Rehab Potential (if transferring to Rehab): {Prognosis:2714125886}    Recommended Labs or Other Treatments After Discharge: ***    Physician Certification: I certify the above information and transfer of Priya Cameron  is necessary for the continuing treatment of the diagnosis listed and that she requires {Admit to Appropriate Level of Care:58866} for {GREATER/LESS:754365802} 30 days. Update Admission H&P: {CHP DME Changes in ECDOH:713540113}    PHYSICIAN SIGNATURE:  Electronically signed by Jarvis Velazquez DO on 2/21/23 at 9:49 AM EST  Sade Rodriguez D.O., F.A.C.O.I.  2/23/2023  8:48 AM

## 2023-02-21 NOTE — PROGRESS NOTES
Internal Medicine Progress Note    [facility]    Nuha Painting. Cyndee Childs, CHON Yip D.O., PETE Estrada D.O. Bary Areas, MSN, APRN, NP-C  Leopoldo Hones. Alex Zee, MSN, APRN-CNP     Primary Care Physician: Audria Lennox, MD   Admitting Physician:  Hazel Davidson DO  Admission date and time: 2/17/2023  8:36 PM    Room:  [address] [place] [address]  Admitting diagnosis: SBO (small bowel obstruction) ([facility] [address] [K56.609]  Hyperglycemia [R73.9]  MAGALY (acute kidney injury) ([facility] [address] [N17.9]  Acute respiratory failure with hypoxia (HCC) [J96.01]  Elevated lactic acid level [R79.89]  Pneumonia of left lung due to infectious organism, unspecified part of lung [J18.9]    Patient Name: Keon Aguilar  MRN: [id number]    Date of Service: 2/21/2023     Subjective: Karen Smith is a 80 y.o. female who was seen and examined today,2/21/2023, at the bedside. Karen Smith was evaluated in the presence of her daughter today. She continues to improve on a daily basis and is feeling dramatically better. Our goal is to wean off nasal cannula oxygen today. She will work with the therapy teams. Patient and family are agreeable to temporary rehabilitation and this is being arranged accordingly. We discussed advancement in her diet, discontinuation of IV fluids, and transition to oral medications. Review of System:   Constitutional:   Denies fever or chills, weight loss or gain, fatigue or malaise. HEENT:   Denies ear pain, sore throat, sinus or eye problems. Nasal cannula oxygen is in place. Cardiovascular:   Denies any chest pain, irregular heartbeats, or palpitations. Respiratory:   Shortness of breath continues to improve. Gastrointestinal:   Denies nausea, vomiting, diarrhea, or constipation. Mild postsurgical pain. Anxious for advancement in her diet. Genitourinary:    Denies any urgency, frequency, hematuria.  Voiding  without difficulty. Extremities:   Denies lower extremity swelling, edema or cyanosis. Neurology:    Denies any headache or focal neurological deficits, admits to generalized weakness and deconditioning. Psch:   Denies being anxious or depressed. Musculoskeletal:    Denies  myalgias, joint complaints or back pain. Integumentary:   Denies any rashes, ulcers, or excoriations. Denies bruising. Hematologic/Lymphatic:  Denies bruising or bleeding. Physical Exam:  No intake/output data recorded. Intake/Output Summary (Last 24 hours) at 2/21/2023 0924  Last data filed at 2/20/2023 1003  Gross per 24 hour   Intake 10 ml   Output --   Net 10 ml   I/O last 3 completed shifts: In: 10 [I.V.:10]  Out: 1100 [Urine:1100]  Patient Vitals for the past 96 hrs (Last 3 readings):   Weight   02/19/23 0500 112 lb 1.6 oz (50.8 kg)   02/18/23 0628 115 lb (52.2 kg)     Vital Signs:   Blood pressure (!) 171/73, pulse 70, temperature 98 °F (36.7 °C), temperature source Oral, resp. rate 18, height 5' 5\" (1.651 m), weight 112 lb 1.6 oz (50.8 kg), SpO2 95 %, not currently breastfeeding. General appearance:  Awake and alert. Significantly better appearing. Head:  Normocephalic. No masses, lesions or tenderness. Eyes:  PERRLA. EOMI. Sclera clear. Buccal mucosa moist.  ENT:  Nasal cannula oxygen is in place at 2 L  Neck:    Supple. Trachea midline. No thyromegaly. No JVD. No bruits. Heart:    Rhythm regular. Rate controlled. 1/6 murmurs. Lungs:    Improving aeration throughout. Abdomen:   Soft. Non-tender. Non-distended. Bowel sounds positive. No organomegaly or masses. No pain on palpation. Right inguinal tenderness  Extremities:    Peripheral pulses present. No peripheral edema. No ulcers. No cyanosis. No clubbing. Neurologic:    Alert x 3. No focal deficit. Cranial nerves grossly intact. No focal weakness. Answers all questions accordingly and follows commands. Cross the midline without difficulty.   Psych: Behavior is normal. Mood appears normal. Speech is not rapid and/or pressured. Musculoskeletal:   Spine ROM normal. Muscular strength intact. Gait not assessed. Integumentary:  No rashes  Skin normal color and texture. Genitalia/Breast:  Posey catheter has been removed    Medication:  Scheduled Meds:   amoxicillin-clavulanate  1 tablet Oral 2 times per day    pantoprazole  40 mg Oral QAM AC    insulin lispro  0-8 Units SubCUTAneous TID WC    insulin lispro  0-4 Units SubCUTAneous Nightly    apixaban  2.5 mg Oral BID    docusate sodium  100 mg Oral Daily    senna  1 tablet Oral Nightly    bisacodyl  10 mg Rectal Once    arformoterol tartrate  15 mcg Nebulization BID    budesonide  0.5 mg Nebulization BID    metoprolol tartrate  12.5 mg Oral BID    sodium chloride flush  5-40 mL IntraVENous BID    albuterol  2.5 mg Nebulization Q4H    And    ipratropium  0.5 mg Nebulization Q4H     Continuous Infusions:   dextrose         Objective Data:  Recent Labs     02/19/23  0433 02/21/23  0635   WBC 8.2 8.3   RBC 3.39* 3.47*   HGB 11.3* 12.1   HCT 35.2 37.0   .8* 106.6*   MCH 33.3 34.9   MCHC 32.1 32.7   RDW 13.2 13.7   * 139   MPV 13.1* 12.1*     Recent Labs     02/19/23  0433 02/21/23  0635    142   K 3.6 3.8   CL 98 103   CO2 25 29   BUN 54* 35*   CREATININE 1.3* 0.8   GLUCOSE 228* 146*   CALCIUM 8.0* 7.8*   PROT 5.1* 4.8*   LABALBU 2.6* 2.3*   BILITOT 0.3 0.4   ALKPHOS 42 47   AST 25 19   ALT 10 10     No results found for: TROPONINI       Assessment:  Small bowel obstruction secondary to incarcerated right inguinal hernia status post laparoscopic repair 2/18/2023  Aspiration pneumonia  Coronary artery disease with status post pacemaker insertion with elevated troponin levels suggesting demand ischemia. Acute on chronic kidney disease stage IV  Essential hypertension. Hyperlipidemia  Minute atrial fibrillation      Plan: . I am very pleased with Chetna's ongoing improvement.   Our goal is to wean off nasal cannula oxygen today and work more aggressively with the therapy teams. Antibiotics will be transitioned to oral.  Diet has been advanced to general.  She and her daughter are agreeable to temporary skilled nursing facility placement and this is being arranged accordingly. I anticipate discharge to the nursing home facility tomorrow once precertification has been obtained. I have updated her daughter at the bedside. More than 50% of my  time was spent at the bedside counseling/coordinating care with the patient and/or family with face to face contact. This time was spent reviewing notes and laboratory data as well as instructing and counseling the patient. Time I spent with the family or surrogate(s) is included only if the patient was incapable of providing the necessary information or participating in medical decisions. I also discussed the differential diagnosis and all of the proposed management plans with the patient and individuals accompanying the patient.     An Jang DO, F.A.C.O.I.  2/21/2023  9:24 AM

## 2023-02-21 NOTE — PLAN OF CARE
Problem: Skin/Tissue Integrity  Goal: Absence of new skin breakdown  Description: 1. Monitor for areas of redness and/or skin breakdown  2. Assess vascular access sites hourly  3. Every 4-6 hours minimum:  Change oxygen saturation probe site  4. Every 4-6 hours:  If on nasal continuous positive airway pressure, respiratory therapy assess nares and determine need for appliance change or resting period.   Outcome: Progressing     Problem: Safety - Adult  Goal: Free from fall injury  Outcome: Progressing     Problem: Musculoskeletal - Adult  Goal: Return mobility to safest level of function  Outcome: Progressing

## 2023-02-21 NOTE — PROGRESS NOTES
Physician Progress Note      PATIENT:               Vincent Orellana  CSN #:                  [id number]  :                       3/5/1929  ADMIT DATE:       2023 8:36 PM  [address] DATE:  Eleno Myers  PROVIDER #:        Abdulkadir Miller DO          QUERY TEXT:    Dear Attending Provider,    Pt admitted with Small bowel obstruction secondary to incarcerated right   inguinal hernia . Pt noted to have hypoxia. If possible, please document in   the progress notes and discharge summary if you are evaluating and/or treating   any of the following: The medical record reflects the following:  Risk Factors: HTN, HLD, hypothyroidism, CAD, CKD 4  Clinical Indicators: per ED note: \" noted to be hypoxic on room air on arrival   84% placed on nasal cannula oxygen with improvement. Celestia Pa Celestia Pa Celestia Pa Patient is tachypneic\"  Treatment: ICU/telemetry monitoring, supplemental oxygen 2-5L, CT Chest    Thank You,  Analy Martino RN, BSN, CDS  Clinical Documentation Improvement Specialist  [phone]  Options provided:  -- Acute respiratory failure with hypoxia  -- Acute respiratory failure with hypercapnia  -- Acute respiratory failure with hypoxia and hypercapnia  -- Other - I will add my own diagnosis  -- Disagree - Not applicable / Not valid  -- Disagree - Clinically unable to determine / Unknown  -- Refer to Clinical Documentation Reviewer    PROVIDER RESPONSE TEXT:    This patient is in acute respiratory failure with hypoxia.     Query created by: Louis Reese on 2023 11:53 AM      Electronically signed by:  Abdulkadir Miller DO 2023 6:54 PM

## 2023-02-21 NOTE — PROGRESS NOTES
Cardiology  Progress Note      SUBJECTIVE:  patient was sitting up in bed when I came to see her. Daughter was at bedside. Patient looked comfortable. She was awake. No acute distress.     Current Inpatient Medications  Current Facility-Administered Medications: pantoprazole (PROTONIX) tablet 40 mg, 40 mg, Oral, QAM AC  0.9% NaCl with KCl 20 mEq infusion, , IntraVENous, Continuous  glucose chewable tablet 16 g, 4 tablet, Oral, PRN  dextrose bolus 10% 125 mL, 125 mL, IntraVENous, PRN **OR** dextrose bolus 10% 250 mL, 250 mL, IntraVENous, PRN  glucagon (rDNA) injection 1 mg, 1 mg, SubCUTAneous, PRN  dextrose 10 % infusion, , IntraVENous, Continuous PRN  insulin lispro (HUMALOG) injection vial 0-8 Units, 0-8 Units, SubCUTAneous, TID WC  insulin lispro (HUMALOG) injection vial 0-4 Units, 0-4 Units, SubCUTAneous, Nightly  sodium phosphate 8.13 mmol in sodium chloride 0.9 % 250 mL IVPB, 0.16 mmol/kg, IntraVENous, PRN **OR** sodium phosphate 16.26 mmol in sodium chloride 0.9 % 250 mL IVPB, 0.32 mmol/kg, IntraVENous, PRN  magnesium sulfate 2000 mg in 50 mL IVPB premix, 2,000 mg, IntraVENous, PRN  potassium chloride (KLOR-CON M) extended release tablet 40 mEq, 40 mEq, Oral, PRN **OR** potassium bicarb-citric acid (EFFER-K) effervescent tablet 40 mEq, 40 mEq, Oral, PRN **OR** potassium chloride 10 mEq/100 mL IVPB (Peripheral Line), 10 mEq, IntraVENous, PRN  apixaban (ELIQUIS) tablet 2.5 mg, 2.5 mg, Oral, BID  piperacillin-tazobactam (ZOSYN) 3,375 mg in sodium chloride 0.9 % 50 mL IVPB ([id number]), 3,375 mg, IntraVENous, Q8H **FOLLOWED BY** [DISCONTINUED] piperacillin-tazobactam (ZOSYN) 3,375 mg in sodium chloride 0.9 % 50 mL IVPB ([id number]), 3,375 mg, IntraVENous, Q12H  docusate sodium (COLACE) capsule 100 mg, 100 mg, Oral, Daily  senna (SENOKOT) tablet 8.6 mg, 1 tablet, Oral, Nightly  bisacodyl (DULCOLAX) suppository 10 mg, 10 mg, Rectal, Once  arformoterol tartrate (BROVANA) nebulizer solution 15 mcg, 15 mcg, Nebulization, BID  budesonide (PULMICORT) nebulizer suspension 500 mcg, 0.5 mg, Nebulization, BID  metoprolol tartrate (LOPRESSOR) tablet 12.5 mg, 12.5 mg, Oral, BID  sodium chloride flush 0.9 % injection 5-40 mL, 5-40 mL, IntraVENous, BID  albuterol (PROVENTIL) nebulizer solution 2.5 mg, 2.5 mg, Nebulization, Q4H **AND** ipratropium (ATROVENT) 0.02 % nebulizer solution 0.5 mg, 0.5 mg, Nebulization, Q4H      Physical  VITALS:  BP (!) 171/73   Pulse 70   Temp 98 °F (36.7 °C) (Oral)   Resp 18   Ht 5' 5\" (1.651 m)   Wt 112 lb 1.6 oz (50.8 kg)   SpO2 95%   BMI 18.65 kg/m²   CURRENT TEMPERATURE:  Temp: 98 °F (36.7 °C)  CONSTITUTIONAL: No acute distress. EYES: Vision is intact. ENT: No sore throat. No ear drainage. NECK: No JVD. BACK: Symmetric. LUNGS:  diminished breath sounds right base and left base  CARDIOVASCULAR:  normal S1 and S2, no S3, and no S4  ABDOMEN:  non-tender  NEUROLOGIC: No focal deficits. EXTREMITIES: No edema cyanosis or clubbing. DATA:          Cardiology Labs:  BMP:    Lab Results   Component Value Date/Time     02/21/2023 06:35 AM    K 3.8 02/21/2023 06:35 AM    K 4.6 02/17/2023 10:01 PM     02/21/2023 06:35 AM    CO2 29 02/21/2023 06:35 AM    BUN 35 02/21/2023 06:35 AM     CBC:    Lab Results   Component Value Date/Time    WBC 8.3 02/21/2023 06:35 AM    RBC 3.47 02/21/2023 06:35 AM    HGB 12.1 02/21/2023 06:35 AM    HCT 37.0 02/21/2023 06:35 AM    .6 02/21/2023 06:35 AM    RDW 13.7 02/21/2023 06:35 AM     02/21/2023 06:35 AM     PT/INR:  No results found for: PTINR  TROPONIN:  No components found for: TROP    ASSESSMENT      1.elevation of high sensitivity troponin. Patient had downtrending troponin elevation starting at 167 going down to 120 and then to 59. This is mostly demand ischemia from her GI symptoms with type II non-STEMI. Patient is 80-year-old lady. EKG is nondiagnostic due to the presence of paced rhythm.   No need for coronary workup at this point. 2.sick sinus syndrome / paroxysmal atrial fibrillation. Status post permanent pacemaker in the past.  On Eliquis. Patient was in AV paced rhythm in my office last month. EKG on admission is consistent with atrial fibrillation with ventricular paced rhythm. Awaiting pacemaker interrogation. 3.small bowel obstruction. Clinically better. NG tube was pulled out. Long discussion with daughter at bedside about all above. All her questions were answered.

## 2023-02-21 NOTE — DISCHARGE INSTRUCTIONS
Patient Discharge Instructions Inguinal Hernia Repair  Janette Huang MD, [address] [facility] [place] (E)  [phone] (f)  Discharge Date:  2/21/2023    Discharged To: Home    RESUME ACTIVITY:     WOUND CARE: The day of surgery be sure to place ice to side of operation for 15min intervals. No need to sleep with ice in place. Keep protective cloth barrier between ice bag and skin to prevent frostbite. Keep legs elevated while sitting for 3 days following surgery. If seated, elevate legs to level of hips on stool. Wear supportive underwear for 5 days postoperatively, no boxer shorts. Bruising is normal after surgery. Ice, supportive underwear and elevation of legs will help reduce scrotal swelling and bruising. BATHING:  May shower 24hrs after surgery, remove dressings after 24hrs if in place, leave steristrips in place as they will fall of independently. You may have adhesive glue covering your incisions which will dissolve on it own. May bathe or swim 5 days after surgery    DRIVING: No driving while on pain medications    RETURN TO WORK: after follow up appointment    WALKING:  Yes    SEXUAL ACTIVITY: Yes    STAIRS:  Yes    LIFTING: Less than 15 pounds for 4 weeks    DIET: General adult    SPECIAL INSTRUCTIONS:     Call physician if they or any other problems occur:  Fever over 101°    Redness, swelling, hardness or warmth at the operative site  Unrelieved nausea    Foul smelling or cloudy drainage at the operative site   Unrelieved pain    Blood soaked dressing. (Some oozing may be normal)    Call office for follow up appointment with Dr Bethanne Siemens in 2 weeks. Call the office at [phone] if you have a fever > 100 F, or if your incision becomes red, tender, or drains more than a small amount of clear fluid.     BOWELS: constipation is a side effect of your pain meds, take a daily laxative (miralax, dulcolax, etc.) as needed to keep your bowels moving as they normally do, do not go 2-3 days without having a bowel movement. Magnesium citrate is available over the counter if you are feeling constipated. Milk of magnesia is another alternative and available in smaller dosing over the counter as well. Pain medications; Ibuprofen should be taken 800mg oral every 6 hrs with food for first 3 days postop. This decreases swelling and helps with pain control. Percocet- take at least 1/2 pill every 4 hours the first 36 hours after surgery, and may take as many as 2 pills every 6 hours for severe pain. After the first 36hours only take the pills as needed and stop them as soon as possible. Pain meds cause constipation so pay close attention to the \"bowels\" topic above. Keep incisions clean and dry. Vicodin/Percocet and ibuprofen for pain as prescribed. Okay to resume anticoagulant medication after 24hrs. Do not exceed 4000mg of Tylenol/Acetaminophen per day. Vicodin/Norco/Percocet contain acetaminophen. Do not take additional amounts of Tylenol if you are taking these medications. You are likely to have pain for the next few days. You may also feel like you have the flu, and you may have a low fever and feel tired and nauseated. This is common. You should feel better after a few days and will probably feel much better in 7 days. For several weeks you may feel twinges or pulling in the hernia repair when you move. You may have some bruising on the scrotum and along the penis. This is normal.     Men will need to wear a jockstrap or briefs, NOT BOXERS, for scrotal support for several days after a groin (inguinal) hernia repair. [employer] bicycle shorts may provide good support. This care sheet gives you a general idea about how long it will take for you to recover. But each person recovers at a different pace. Follow the steps below to get better as quickly as possible. Activity  Rest when you feel tired. Getting enough sleep will help you recover.   Try to walk each day. Start by walking a little more than you did the day before. Bit by bit, increase the amount you walk. Walking boosts blood flow and helps prevent pneumonia and constipation.  Avoid strenuous activities, such as biking, jogging, weight lifting, or aerobic exercise, until your doctor says it is okay.  Avoid lifting anything that would make you strain. This may include heavy grocery bags and milk containers, a heavy briefcase or backpack, cat litter or dog food bags, a vacuum , or a child.  You may drive when you are no longer taking pain medicine and can quickly move your foot from the gas pedal to the brake. You must also be able to sit comfortably for a long period of time, even if you do not plan to go far. You might get caught in traffic.  Most people are able to return to work within 1 to 2 weeks after surgery.  You may shower 24 to 48 hours after surgery, if your doctor okays it. Pat the cut (incision) dry. Do not take a bath for the first 2 weeks, or until your doctor tells you it is okay.  Your doctor will tell you when you can have sex again.    Diet  You can eat your normal diet. If your stomach is upset, try bland, low-fat foods like plain rice, broiled chicken, toast, and yogurt.  Drink plenty of fluids (unless your doctor tells you not to).  You may notice that your bowel movements are not regular right after your surgery. This is common. Avoid constipation and straining with bowel movements. You may want to take a fiber supplement every day. If you have not had a bowel movement after a couple of days, ask your doctor about taking a mild laxative.    Medicines  Be safe with medicines. Take pain medicines exactly as directed.  If the doctor gave you a prescription medicine for pain, take it as prescribed.  If you are not taking a prescription pain medicine, take an over-the-counter medicine such as acetaminophen (Tylenol), ibuprofen (Advil, Motrin), or naproxen (Aleve). Read and follow all  instructions on the label. Do not take two or more pain medicines at the same time unless the doctor told you to. Many pain medicines have acetaminophen, which is Tylenol. Too much acetaminophen (Tylenol) can be harmful. If your doctor prescribed antibiotics, take them as directed. Do not stop taking them just because you feel better. You need to take the full course of antibiotics. If you think your pain medicine is making you sick to your stomach: Take your medicine after meals (unless your doctor has told you not to). Ask your doctor for a different pain medicine. Incision care  If you have strips of tape on the cut (incision) the doctor made, leave the tape on for a week or until it falls off. If you have staples closing the cut, you will need to visit your doctor in 1 to 2 weeks to have them removed. Wash the area daily with warm, soapy water and pat it dry. Follow-up care is a key part of your treatment and safety. Be sure to make and go to all appointments, and call your doctor if you are having problems. It's also a good idea to know your test results and keep a list of the medicines you take. When should you call for help? Call 911 anytime you think you may need emergency care. For example, call if:  You passed out (lost consciousness). You have sudden chest pain and shortness of breath, or you cough up blood. You have severe pain in your belly. Call your doctor now or seek immediate medical care if:  You are sick to your stomach and cannot keep fluids down. You have signs of a blood clot, such as:  Pain in your calf, back of the knee, thigh, or groin. Redness and swelling in your leg or groin. You have trouble passing urine or stool, especially if you have mild pain or swelling in your lower belly. Bright red blood has soaked through the bandage over your incision. Watch closely for any changes in your health, and be sure to contact your doctor if:  Your swelling is getting worse. Your swelling is not going down. Your information:  Name: Peace Riggins  : 3/5/1929    Your instructions:    Discharged to [address] [place] [address]. Please make and keep any follow up appointments. What to do after you leave the hospital:    Recommended diet:  lactose intolerant    Recommended activity: activity as tolerated        The following personal items were collected during your admission and were returned to you:    Belongings  Dental Appliances: None  Vision - Corrective Lenses: Eyeglasses (eyeglasses and eyeglasses case now bedside)  Hearing Aid: Bilateral hearing aids (bilateral hearing aids, and  now bedside)  Clothing: Robe (grey robe)  Jewelry: None  Electronic Devices: Other (Comment) (ovi, ovi case, and ovi  are bedside)  Weapons (Notify Protective Services/Security): None  Home Medications: None  Valuables Given To: Patient  Provide Name(s) of Who Valuable(s) Were Given To: n/a    Information obtained by:  By signing below, I understand that if any problems occur once I leave the hospital I am to contact [address] [place] [address]. I understand and acknowledge receipt of the instructions indicated above.

## 2023-02-21 NOTE — PROGRESS NOTES
General Surgery Progress Note  Keesha Cantor MD, MS    Patient's Name/Date of Birth: Rosetta Tee / 3/5/1929    Date: February 21, 2023     Surgeon: Gabbi Alcazar MD    Chief Complaint: s/p lap inguinal hernia repair    Patient Active Problem List   Diagnosis    Substernal thyroid    Right cataract    Left cataract    SBO (small bowel obstruction) ([address]    Pneumonia of left lung due to infectious organism    Incarcerated inguinal hernia, unilateral       Subjective: doing well, improved    Objective:  BP (!) 145/74   Pulse 70   Temp 97.8 °F (36.6 °C) (Axillary)   Resp 20   Ht 5' 5\" (1.651 m)   Wt 112 lb 1.6 oz (50.8 kg)   SpO2 95%   BMI 18.65 kg/m²   Labs:  Recent Labs     02/18/23  0732 02/19/23  0433   WBC 12.2* 8.2   HGB 13.2 11.3*   HCT 41.2 35.2     Lab Results   Component Value Date    CREATININE 1.3 (H) 02/19/2023    BUN 54 (H) 02/19/2023     02/19/2023    K 3.6 02/19/2023    CL 98 02/19/2023    CO2 25 02/19/2023     No results for input(s): LIPASE, AMYLASE in the last 72 hours.       General appearance:  NAD  Head: NCAT, PERRLA, EOMI, red conjunctiva  Neck: supple, no masses  Lungs: CTAB, equal chest rise bilateral  Heart: Reg rate  Abdomen: soft, nondistended, tender appropriately, incision C/D/I  Skin; no lesions  Gu: no cva tenderness  Extremities: extremities normal, atraumatic, no cyanosis or edema      Assessment/Plan:  Rosetta Tee is a 80 y.o. female POD 3 dx lap and right inguinal hernia repair for incarcerated SBO, aspiration and hypoxia on presentation to ED    Gen diet  DC planning ok when ok with others  Ambulate  PT/OT      Physician Signature: Electronically signed by Dr. Gabbi Alcazar  [phone] (p)  2/21/2023  7:07 AM

## 2023-02-21 NOTE — PROGRESS NOTES
Physical Therapy Initial Evaluation/Plan of Care    Room #:  0750/6796-83  Patient Name: Sheri Thayer  YOB: 1929  MRN: [id number]    Date of Service: 2/21/2023     Tentative placement recommendation: Subacute Rehab  Equipment recommendation: To be determined      Evaluating Physical Therapist: Reginaldo Church, PT #[id number]      Specific Provider Orders/Date/Referring Provider :  02/20/23 0945    PT eval and treat  Start:  02/20/23 0945,   End:  02/20/23 0945,   ONE TIME,   Standing Count:  1 Occurrences,   R         [facility],      Admitting Diagnosis:   SBO (small bowel obstruction) ([facility] [K56.609]  Hyperglycemia [R73.9]  MAGALY (acute kidney injury) ([facility] [N17.9]  Acute respiratory failure with hypoxia (HCC) [J96.01]  Elevated lactic acid level [R79.89]  Pneumonia of left lung due to infectious organism, unspecified part of lung [J18.9]     nausea and vomiting  Surgery:   DATE OF PROCEDURE: 2/18/2023  OPERATION: Diagnostic laparoscopy, release small bowel obstruction, incarcerated strangulated right inguinal hernia repair with mesh. SURGEON: Hawa Pretty MD  Visit Diagnoses         Codes    Pneumonia of left lung due to infectious organism, unspecified part of lung    -  Primary J18.9    MAGALY (acute kidney injury) ([facility]     N17.9    Elevated lactic acid level     R79.89    Hyperglycemia     R73.9    Acute respiratory failure with hypoxia ([facility]     J96.01            Patient Active Problem List   Diagnosis    Substernal thyroid    Right cataract    Left cataract    SBO (small bowel obstruction) (HCC)    Pneumonia of left lung due to infectious organism    Incarcerated inguinal hernia, unilateral        ASSESSMENT of Current Deficits Patient exhibits decreased strength, balance, and endurance impairing functional mobility, transfers, gait , gait distance, and tolerance to activity slow ashley, increased Right lower extremity exrternatl rotation. Fatigues quickly requiring seated rest break. Maintains O2 saturation during gait/therapy on room air. PHYSICAL THERAPY  PLAN OF CARE       Physical therapy plan of care is established based on physician order,  patient diagnosis and clinical assessment    Current Treatment Recommendations:    -Bed Mobility: Lower extremity exercises   -Sitting Balance: Incorporate reaching activities to activate trunk muscles   -Standing Balance: Perform strengthening exercises in standing to promote motor control with or without upper extremity support   -Transfers: Provide instruction on proper hand and foot position for adequate transfer of weight onto lower extremities and use of gait device if needed, Cues for hand placement, technique and safety. Provide stabilization to prevent fall , and Assist with extension of knees trunk and hip to accept weight transfer   -Gait: Gait training, Standing activities to improve: base of support, weight shift, weight bearing , and Pregait training to emphasize: Device control, Upright, Safety, and pacing   -Endurance: Utilize Supervised activities to increase level of endurance to allow for safe functional mobility including transfers and gait  and Use graduated activities to promote good breathing techniques and provide support and education to maximize respiratory function    PT long term treatment goals are located in below grid    Patient and or family understand(s) diagnosis, prognosis, and plan of care. Frequency of treatments: Patient will be seen  daily.          Prior Level of Function: Patient ambulated independently, with wheeled walker for outdoor and community distances    Rehab Potential: good   for baseline    Past medical history:   Past Medical History:   Diagnosis Date    GERD (gastroesophageal reflux disease)     Hearing impaired     Hyperlipidemia     Hypertension     Small bowel obstruction ([facility]     Thyroid disease      Past Surgical History:   Procedure Laterality Date    APPENDECTOMY      CATARACT REMOVAL WITH IMPLANT Right 04/15/2014    CATARACT REMOVAL WITH IMPLANT Left 11/15/2016    COLECTOMY N/A 2/18/2023    LAPAROSCOPIC INCARCERATED STRANGULATED RIGHT INGUINAL HERNIA REPAIR WITH MESH performed by Mai Vines MD at [address] ([address] [facility])      LAPAROTOMY  02/18/2023    release sbo incarcerated strangulated right inguinal hernia with mesh repair    OTHER SURGICAL HISTORY  08/2012    RIGHT HEMITHYROIDECTOMY    PACEMAKER INSERTION      PACEMAKER PLACEMENT  1995    [employer]        SUBJECTIVE:    Precautions: Use lift equipment for lifting patient  and Please assist the patient at the bedside chair for the majority of the day , falls and alarm , s/p laparoscopy, release small bowel obstruction, incarcerated strangulated right inguinal hernia repair with mesh. Imaging results: CT ABDOMEN PELVIS WO CONTRAST Additional Contrast? None    Result Date: 2/18/2023  EXAMINATION: CT OF THE ABDOMEN AND PELVIS WITHOUT CONTRAST 2/18/2023 9:27 am    Small bowel obstruction secondary to a right inguinal hernia. The degree of small bowel dilation is stable to slightly increased compared with CT from yesterday. No evidence of bowel ischemia at this time. Small bilateral pleural effusions with bibasilar atelectasis and somewhat consolidative airspace opacities in the lingula. Lingular opacities may be secondary to atelectasis, pneumonia and or aspiration. CT ABDOMEN PELVIS WO CONTRAST Additional Contrast? None    Result Date: 2/17/2023  EXAMINATION: CT OF THE ABDOMEN AND PELVIS WITHOUT CONTRAST 2/17/2023 11:00 pm    Small-bowel obstruction due to a right inguinal hernia. CT CHEST WO CONTRAST    Result Date: 2/17/2023  EXAMINATION: CT OF THE CHEST WITHOUT CONTRAST 2/17/2023 11:00 pm    Ill-defined opacities left lower and upper lobes are consistent with infiltrates, and to a lesser degree is not excluded in the right lower lobe.      XR CHEST PORTABLE    Result Date: 2/17/2023  EXAMINATION: ONE XRAY VIEW OF THE CHEST 2/17/2023 9:18 pm    Left lower lung alveolar infiltrate consistent with pneumonia. Small bilateral pleural effusions. XR ABDOMEN FOR NG/OG/NE TUBE PLACEMENT    Result Date: 2/18/2023  EXAMINATION: ONE SUPINE XRAY VIEW(S) OF THE ABDOMEN 2/18/2023 2:45 am    Gastric tube in good position. RECOMMENDATION: Careful clinical correlation and follow up recommended. Social history: Patient lives with son Vineet Gonzalez in a ranch home  with No steps  to enter home. Tub shower  suction cup grab bar, rail clamped onto tub    Equipment owned: [employer], Bedside commode, and Shower chair,       AM-PAC Basic Mobility        AM-PAC Basic Mobility - Inpatient   How much help is needed turning from your back to your side while in a flat bed without using bedrails?: A Little  How much help is needed moving from lying on your back to sitting on the side of a flat bed without using bedrails?: A Lot  How much help is needed moving to and from a bed to a chair?: A Lot  How much help is needed standing up from a chair using your arms?: A Lot  How much help is needed walking in hospital room?: A Little  How much help is needed climbing 3-5 steps with a railing?: A Lot  AM-PAC Inpatient Mobility Raw Score : 14  AM-PAC Inpatient T-Scale Score : 38.1  Mobility Inpatient CMS 0-100% Score: 61.29  Mobility Inpatient CMS G-Code Modifier : CL    Nursing cleared patient for PT evaluation. The admitting diagnosis and active problem list as listed above have been reviewed prior to the initiation of this evaluation. OBJECTIVE;   Initial Evaluation  Date: 2/21/2023 Treatment Date:     Short Term/ Long Term   Goals   Was pt agreeable to Eval/treatment? Yes  To be met in 3 days   Pain level   0/10        Bed Mobility    Rolling: Moderate assist of 1    Supine to sit: Moderate assist of 1  log roll  Sit to supine: Not assessed     Scooting:  Moderate assist of 1    Rolling: Supervision Supine to sit: Supervision     Sit to supine: Supervision     Scooting: Supervision      Transfers Sit to stand: Moderate assist of 1 Cues for hand placement and safety   Sit to stand: Supervision      Ambulation     2 x 50 feet using  wheeled walker with Minimal assist of 1   for walker control and safety and cues for upright posture, safety, and pacing slow ashley increased Right lower extremity external rotation    75 feet using  wheeled walker with Supervision     Stair negotiation: ascended and descended   Not assessed          ROM Within functional limits    Increase range of motion 10% of affected joints    Strength BUE:  refer to OT eval  RLE:  3+/5  LLE:  3+/5  Increase strength in affected mm groups by 1/3 grade   Balance Sitting EOB:  good -  Dynamic Standing:  fair wheeled walker   Sitting EOB:  good    Dynamic Standing: fair +wheeled walker      Patient is Alert & Oriented x person, place, time, and situation and follows directions    Sensation:  Patient  denies numbness/tingling   Edema:  yes bilateral upper extremities   Endurance: fair       Vitals:  1.5 liters nasal cannula   Blood Pressure at rest  Blood Pressure during session    Heart Rate at rest 70 Heart Rate during session 70   SPO2 at rest 94%  SPO2 during session 90-96% room air during gait/activity     Patient education  Patient educated on role of Physical Therapy, risks of immobility, safety and plan of care,  importance of mobility while in hospital , purse lip breathing, and ankle pumps, quad set and glut set for edema control, blood clot prevention     Patient response to education:   Pt verbalized understanding Pt demonstrated skill Pt requires further education in this area   Yes Partial Yes      Treatment:  Patient practiced and was instructed/facilitated in the following treatment: Patient assisted to edge of bed,   Sat edge of bed 5 minutes with Supervision  to increase dynamic sitting balance and activity tolerance.  ambulated in hallway seated rest with daughters present. ambulated  back to room, assisted up to chair. Therapeutic Exercises:  ankle pumps  x 10 reps. At end of session, patient in chair with alarm and daughter present call light and phone within reach,  all lines and tubes intact, nursing notified. Patient would benefit from continued skilled Physical Therapy to improve functional independence and quality of life. Patient's/ family goals   rehab    Time in  0852  Time out  0925    Total Treatment Time  13 minutes    Evaluation time includes thorough review of current medical information, gathering information on past medical history/social history and prior level of function, completion of standardized testing/informal observation of tasks, assessment of data, and development of Plan of care and goals.      CPT codes:  Low Complexity PT evaluation (56794)  Therapeutic activities (12498)   13 minutes  1 unit(s)    Clary Kelly PT

## 2023-02-21 NOTE — PLAN OF CARE
Problem: Musculoskeletal - Adult  Goal: Return mobility to safest level of function  Outcome: Progressing     Problem: Gastrointestinal - Adult  Goal: Maintains or returns to baseline bowel function  Outcome: Progressing     Problem: Genitourinary - Adult  Goal: Urinary catheter remains patent  Outcome: Completed     Problem: Chronic Conditions and Co-morbidities  Goal: Patient's chronic conditions and co-morbidity symptoms are monitored and maintained or improved  Outcome: Progressing

## 2023-02-21 NOTE — CARE COORDINATION
Ss note: 2/21/2023 4:03 PM ADDENDUM: Neg covid on 2-17-23, Rapid covid ordered today. [facility]. Will need discharge order, Hens and signed JANE. Pt/family remain agreeable to plan. STEPHAN Sousa    Ss note: 2/21/2023 12:17 PM Neg covid on 2-17-23. Need RAPID COVID prior to discharge. Per Dale Turner with [facility], pt has been accepted and liaison submitting for [facility] for anticipated discharge tomorrow. Will need precert, rapid, hens and signed JANE for SNF.  STEPHAN Sousa

## 2023-02-22 ENCOUNTER — APPOINTMENT (OUTPATIENT)
Dept: CT IMAGING | Age: 88
End: 2023-02-22
Payer: MEDICARE

## 2023-02-22 LAB
ALBUMIN SERPL-MCNC: 2.3 G/DL (ref 3.5–5.2)
ALP BLD-CCNC: 45 U/L (ref 35–104)
ALT SERPL-CCNC: 12 U/L (ref 0–32)
ANION GAP SERPL CALCULATED.3IONS-SCNC: 9 MMOL/L (ref 7–16)
AST SERPL-CCNC: 21 U/L (ref 0–31)
BASOPHILS ABSOLUTE: 0 E9/L (ref 0–0.2)
BASOPHILS RELATIVE PERCENT: 0 % (ref 0–2)
BILIRUB SERPL-MCNC: 0.5 MG/DL (ref 0–1.2)
BUN BLDV-MCNC: 22 MG/DL (ref 6–23)
CALCIUM SERPL-MCNC: 7.5 MG/DL (ref 8.6–10.2)
CHLORIDE BLD-SCNC: 101 MMOL/L (ref 98–107)
CO2: 27 MMOL/L (ref 22–29)
CREAT SERPL-MCNC: 0.7 MG/DL (ref 0.5–1)
EKG ATRIAL RATE: 468 BPM
EKG Q-T INTERVAL: 460 MS
EKG QRS DURATION: 156 MS
EKG QTC CALCULATION (BAZETT): 496 MS
EKG R AXIS: -72 DEGREES
EKG T AXIS: 84 DEGREES
EKG VENTRICULAR RATE: 70 BPM
EOSINOPHILS ABSOLUTE: 0 E9/L (ref 0.05–0.5)
EOSINOPHILS RELATIVE PERCENT: 0 % (ref 0–6)
GFR SERPL CREATININE-BSD FRML MDRD: >60 ML/MIN/1.73
GLUCOSE BLD-MCNC: 140 MG/DL (ref 74–99)
HCT VFR BLD CALC: 39.3 % (ref 34–48)
HEMOGLOBIN: 12.1 G/DL (ref 11.5–15.5)
LYMPHOCYTES ABSOLUTE: 0.86 E9/L (ref 1.5–4)
LYMPHOCYTES RELATIVE PERCENT: 10 % (ref 20–42)
MCH RBC QN AUTO: 32.8 PG (ref 26–35)
MCHC RBC AUTO-ENTMCNC: 30.8 % (ref 32–34.5)
MCV RBC AUTO: 106.5 FL (ref 80–99.9)
METER GLUCOSE: 125 MG/DL (ref 74–99)
METER GLUCOSE: 145 MG/DL (ref 74–99)
METER GLUCOSE: 148 MG/DL (ref 74–99)
METER GLUCOSE: 165 MG/DL (ref 74–99)
MONOCYTES ABSOLUTE: 0.6 E9/L (ref 0.1–0.95)
MONOCYTES RELATIVE PERCENT: 7 % (ref 2–12)
MYELOCYTE PERCENT: 3 % (ref 0–0)
NEUTROPHILS ABSOLUTE: 7.14 E9/L (ref 1.8–7.3)
NEUTROPHILS RELATIVE PERCENT: 80 % (ref 43–80)
PDW BLD-RTO: 13.3 FL (ref 11.5–15)
PLATELET # BLD: 152 E9/L (ref 130–450)
PMV BLD AUTO: 12.5 FL (ref 7–12)
POTASSIUM SERPL-SCNC: 3.6 MMOL/L (ref 3.5–5)
RBC # BLD: 3.69 E12/L (ref 3.5–5.5)
SODIUM BLD-SCNC: 137 MMOL/L (ref 132–146)
TOTAL PROTEIN: 4.6 G/DL (ref 6.4–8.3)
TROPONIN, HIGH SENSITIVITY: 41 NG/L (ref 0–9)
WBC # BLD: 8.6 E9/L (ref 4.5–11.5)

## 2023-02-22 PROCEDURE — 97530 THERAPEUTIC ACTIVITIES: CPT

## 2023-02-22 PROCEDURE — 6370000000 HC RX 637 (ALT 250 FOR IP): Performed by: INTERNAL MEDICINE

## 2023-02-22 PROCEDURE — 36415 COLL VENOUS BLD VENIPUNCTURE: CPT

## 2023-02-22 PROCEDURE — 1200000000 HC SEMI PRIVATE

## 2023-02-22 PROCEDURE — 2700000000 HC OXYGEN THERAPY PER DAY

## 2023-02-22 PROCEDURE — 6370000000 HC RX 637 (ALT 250 FOR IP): Performed by: SURGERY

## 2023-02-22 PROCEDURE — 84484 ASSAY OF TROPONIN QUANT: CPT

## 2023-02-22 PROCEDURE — 2580000003 HC RX 258: Performed by: INTERNAL MEDICINE

## 2023-02-22 PROCEDURE — 94640 AIRWAY INHALATION TREATMENT: CPT

## 2023-02-22 PROCEDURE — 97535 SELF CARE MNGMENT TRAINING: CPT

## 2023-02-22 PROCEDURE — 6360000002 HC RX W HCPCS: Performed by: INTERNAL MEDICINE

## 2023-02-22 PROCEDURE — 6360000002 HC RX W HCPCS: Performed by: SURGERY

## 2023-02-22 PROCEDURE — 82962 GLUCOSE BLOOD TEST: CPT

## 2023-02-22 PROCEDURE — 80053 COMPREHEN METABOLIC PANEL: CPT

## 2023-02-22 PROCEDURE — 70450 CT HEAD/BRAIN W/O DYE: CPT

## 2023-02-22 PROCEDURE — 85025 COMPLETE CBC W/AUTO DIFF WBC: CPT

## 2023-02-22 RX ADMIN — IPRATROPIUM BROMIDE 0.5 MG: 0.5 SOLUTION RESPIRATORY (INHALATION) at 09:12

## 2023-02-22 RX ADMIN — IPRATROPIUM BROMIDE 0.5 MG: 0.5 SOLUTION RESPIRATORY (INHALATION) at 13:14

## 2023-02-22 RX ADMIN — IPRATROPIUM BROMIDE 0.5 MG: 0.5 SOLUTION RESPIRATORY (INHALATION) at 22:05

## 2023-02-22 RX ADMIN — IPRATROPIUM BROMIDE 0.5 MG: 0.5 SOLUTION RESPIRATORY (INHALATION) at 17:55

## 2023-02-22 RX ADMIN — AMOXICILLIN AND CLAVULANATE POTASSIUM 1 TABLET: 875; 125 TABLET, FILM COATED ORAL at 20:21

## 2023-02-22 RX ADMIN — ALBUTEROL SULFATE 2.5 MG: 2.5 SOLUTION RESPIRATORY (INHALATION) at 17:55

## 2023-02-22 RX ADMIN — METOPROLOL TARTRATE 12.5 MG: 25 TABLET, FILM COATED ORAL at 20:21

## 2023-02-22 RX ADMIN — ALBUTEROL SULFATE 2.5 MG: 2.5 SOLUTION RESPIRATORY (INHALATION) at 05:36

## 2023-02-22 RX ADMIN — AMOXICILLIN AND CLAVULANATE POTASSIUM 1 TABLET: 875; 125 TABLET, FILM COATED ORAL at 09:04

## 2023-02-22 RX ADMIN — IPRATROPIUM BROMIDE 0.5 MG: 0.5 SOLUTION RESPIRATORY (INHALATION) at 05:36

## 2023-02-22 RX ADMIN — ALBUTEROL SULFATE 2.5 MG: 2.5 SOLUTION RESPIRATORY (INHALATION) at 09:12

## 2023-02-22 RX ADMIN — ALBUTEROL SULFATE 2.5 MG: 2.5 SOLUTION RESPIRATORY (INHALATION) at 22:05

## 2023-02-22 RX ADMIN — ALBUTEROL SULFATE 2.5 MG: 2.5 SOLUTION RESPIRATORY (INHALATION) at 13:14

## 2023-02-22 RX ADMIN — ARFORMOTEROL TARTRATE 15 MCG: 15 SOLUTION RESPIRATORY (INHALATION) at 17:55

## 2023-02-22 RX ADMIN — Medication 10 ML: at 09:05

## 2023-02-22 RX ADMIN — METOPROLOL TARTRATE 12.5 MG: 25 TABLET, FILM COATED ORAL at 09:04

## 2023-02-22 RX ADMIN — SENNOSIDES 8.6 MG: 8.6 TABLET, FILM COATED ORAL at 20:21

## 2023-02-22 RX ADMIN — ARFORMOTEROL TARTRATE 15 MCG: 15 SOLUTION RESPIRATORY (INHALATION) at 05:36

## 2023-02-22 RX ADMIN — BUDESONIDE 500 MCG: 0.5 INHALANT RESPIRATORY (INHALATION) at 17:55

## 2023-02-22 RX ADMIN — BUDESONIDE 500 MCG: 0.5 INHALANT RESPIRATORY (INHALATION) at 05:36

## 2023-02-22 RX ADMIN — APIXABAN 2.5 MG: 5 TABLET, FILM COATED ORAL at 09:04

## 2023-02-22 RX ADMIN — DOCUSATE SODIUM 100 MG: 100 CAPSULE, LIQUID FILLED ORAL at 09:05

## 2023-02-22 RX ADMIN — APIXABAN 2.5 MG: 5 TABLET, FILM COATED ORAL at 20:21

## 2023-02-22 RX ADMIN — Medication 10 ML: at 20:21

## 2023-02-22 NOTE — CARE COORDINATION
Ss note: 2/22/2023 9:25 AM Neg covid on 2-21-23. PRECERT obtained for [facility] and per liaison Hilda Manzano is valid through tomorrow. Per IDR, pt had a change in status late yesterday, is on 1 liter of oxygen. SW will follow to arrange for transfer when medically stable, requires Hens and signed JANE. Family will need notified when discharge is written.  STEPHAN Tsai

## 2023-02-22 NOTE — PROGRESS NOTES
OCCUPATIONAL THERAPY BEDSIDE TREATMENT NOTE   [address] [facility]  [address]. [place]    Date:2023  Patient Name: James Foreman  MRN: [id number]  : 3/5/1929  Room: [address] [place] [address]       Evaluating OT: Sherrell Samayoa OTR/L; [id number]      Referring Provider and Specific Provider Orders/Date:      23   OT eval and treat  Start:  23,   End:  23,   ONE TIME,   Standing Count:  1 Occurrences,   R         John , DO       Placement Recommendation: Subacute         Diagnosis:   1. Pneumonia of left lung due to infectious organism, unspecified part of lung    2. MAGALY (acute kidney injury) ([facility] [address]    3. Elevated lactic acid level    4. Hyperglycemia    5. SBO (small bowel obstruction) ([facility] [address]    6.  Acute respiratory failure with hypoxia [facility])         Surgery:  incarcerated right inguinal hernia status post laparoscopic repair on 2023       Pertinent Medical History:       Past Medical History        Past Medical History:   Diagnosis Date    GERD (gastroesophageal reflux disease)      Hearing impaired      Hyperlipidemia      Hypertension      Small bowel obstruction (HCC)      Thyroid disease              Past Surgical History         Past Surgical History:   Procedure Laterality Date    APPENDECTOMY        CATARACT REMOVAL WITH IMPLANT Right 04/15/2014    CATARACT REMOVAL WITH IMPLANT Left 11/15/2016    COLECTOMY N/A 2023     LAPAROSCOPIC INCARCERATED STRANGULATED RIGHT INGUINAL HERNIA REPAIR WITH MESH performed by Sundar Joshi MD at [address] ([address] [place] [address])        LAPAROTOMY   2023     release sbo incarcerated strangulated right inguinal hernia with mesh repair    OTHER SURGICAL HISTORY   2012     RIGHT HEMITHYROIDECTOMY    PACEMAKER INSERTION        PACEMAKER PLACEMENT        Are You a Human           Precautions:  Fall Risk,  incarcerated right inguinal hernia status post laparoscopic repair on 2/18/2023, labile, confusion, pt hallucinating - see babies in the room (at eval); pt teary-eyed when card from flowers read to her     Comment: 3 adult children present in the room and states she is normally \"sharper than a tack,\" and report this is not like her (at eval)       Assessment of current deficits:     [x] Functional mobility            [x]ADLs           [x] Strength                   [x]Cognition    [x] Functional transfers          [x] IADLs          [x] Safety Awareness   [x]Endurance    [] Fine Coordination              [x] Balance      [] Vision/perception    []Sensation      []Gross Motor Coordination  [] ROM           [] Delirium                   [] Motor Control      OT PLAN OF CARE   OT POC based on physician orders, patient diagnosis and results of clinical assessment     Frequency/Duration 1-3 days/wk for 2 weeks PRN      Specific OT Treatment Interventions to include:   * Instruction/training on adapted ADL techniques and AE recommendations to increase functional independence within precautions       * Training on energy conservation strategies, correct breathing pattern and techniques to improve independence/tolerance for self-care routine  * Functional transfer/mobility training/DME recommendations for increased independence, safety, and fall prevention  * Patient/Family education to increase follow through with safety techniques and functional independence  * Recommendation of environmental modifications for increased safety with functional transfers/mobility and ADLs  * Therapeutic exercise to improve motor endurance, ROM, and functional strength for ADLs/functional transfers  * Therapeutic activities to facilitate/challenge dynamic balance, stand tolerance for increased safety and independence with ADLs  * Positioning to improve skin integrity, interaction with environment and functional independence     Recommended Adaptive Equipment: TBD at rehab       Home Living: with son Yamileth Butler); single family home, 1 story, no steps to enter, tub shower. Equipment owned: wheeled walker, bedside commode, shower chair, suction cup grab bar, rail clamped onto tub     Prior Level of Function: Independent with ADLs and IADLs; ambulated with wheeled walker     Driving: no  Occupation: retired     Pain Level: no c/o pain at this time               Cognition: A&O: 1/4; Follows 1 step directions (alert to self)              Memory: poor              Sequencing: poor              Problem solving: poor              Judgement/safety: poor     [facility] Daily Activity - Inpatient   How much help is needed for putting on and taking off regular lower body clothing?: A Lot  How much help is needed for bathing (which includes washing, rinsing, drying)?: A Lot  How much help is needed for toileting (which includes using toilet, bedpan, or urinal)?: A Lot  How much help is needed for putting on and taking off regular upper body clothing?: A Lot  How much help is needed for taking care of personal grooming?: A Little  How much help for eating meals?: None  [facility] Inpatient Daily Activity Raw Score: 15  AM-PAC Inpatient ADL T-Scale Score : 34.69  ADL Inpatient CMS 0-100% Score: 56.46  ADL Inpatient CMS G-Code Modifier : CK                Functional Assessment:     Initial Eval Status  Date: 2/20/23 Treatment Status  Date: 2/22/23 STGs = LTGs  Time frame: 10-14 days   Feeding Independent  N/T  Independent    Grooming Minimal Assist  N/T Independent    UB Dressing Maximal Assist to doff and don \[facility] [place] [facility]\"" gown while seated in bedside chair.   Max A to change gowns when seated in bed with HOB elevated 2* to gown being wet Supervision    LB Dressing Maximal Assist  Max A to change brief when seated EOB/standing and on RTB  Supervision    Bathing Maximal Assist N/T  Supervision    Toileting Maximal Assist  Max A for hygiene d/t incontinence of bowel and bladder; max A for side rolling for placement on/off bedpan Supervision    Bed Mobility  Supine to sit: N/T as pt was up in chair   Sit to supine: N/T as pt was up in chair  Mod A for supine to sit with assist to guide LE's to edge of cart and UB to sitting; scooting at mod A on transport cart; sit to supine at mod A with assist to support B LE's to supine and UB to supine; HOB elevated at end of session  Supine to sit: Independent   Sit to supine: Independent    Functional Transfers Moderate Assist from bedside chair to wheeled walker  Transfer training with verbal cues for hand placement throughout session to improve safety. Mod A for sit to stand transfer from transport cart with FWW; transfer to EOB at mod A with FWW; sit to stand from EOB at mod A to doff brief and for hygiene with HHA  Supervision    Functional Mobility Minimal assist progressing to moderate assist with wheeled walker to improve balance for household distance (20 feet x 2), verbal cues for walker sequence and safety. Mod A with FWW from transport cart in hallway to EOB with FWW; no LOB noted; slightly unsteady Modified [place]    Balance Sitting:     Static: good     Dynamic: fair   Standing: fair  with wheeled walker Sitting:     Static: good     Dynamic: fair   Standing: fair /fair minus with wheeled walker and Mod A     Activity Tolerance Fair   fair/fair minus good    Visual/  Perceptual Glasses: yes                        Hand Dominance: right      Hearing: WFL   Sensation:  No c/o numbness or tingling  Tone: WFL   Edema: no     Comments: Patient cleared by nursing staff. Upon arrival pt supine on transport cart. Pt agreeable to OT tx session. Pt educated with regards to bed mobility, hand placement, safety awareness, static sitting balance,  standing balance, transfer training, functional mobility, ADL retraining, LE/UE dressing, toileting/hygiene, ECT's.   At end of session pt seated in bed with HOB partially elevated  with all lines and tubes intact, call light within reach. Overall, pt demonstrated decreased independence and safety during completion of ADL/functional transfers/mobility tasks. Pt would benefit from continued skilled OT to increase safety and independence with completion of ADL/IADL tasks for functional independence and quality of life. Pt required cues and education as noted above for safe facilitation and completion of tasks. Therapist provided skilled monitoring of patient's response during treatment session. Prior to and at the end of session, environmental modifications  completed for patients safety and efficiency of treatment session. Overall, patient demonstrates moderate difficulties with completion of BADLs and IADLs. Factors contributing to these difficulties include bowel and bladder incontinence, decreased endurance, and generalized weakness. As noted above, patient likely to benefit from further OT intervention to increase independence, safety, and overall quality of life. Pt states she \"is having a hard time distinguishing between what is real and what is going on in her mind\". Pt states she \"feels like [she] is floating\". Treatment:     Bed mobility: Facilitated bed mobility with cues for proper body mechanics and sequencing to prepare for ADL completion. Functional transfers: Facilitated transfers from transport cart to/from/to EOB with cues for body alignment, safety and hand placement. ADL completion: Self-care retraining for the above-mentioned ADLs; training on proper hand placement, safety technique, sequencing, and energy conservation techniques. Toileting/hygiene  Postural Balance: Sitting/standing balance retraining to improve righting reactions with postural changes during ADLs.   Skilled positioning: Proper positioning to improve interaction with environment, overall functioning      Pt has made fair progress towards set goals   OT 1-3 days/wk for 2 weeks PRN     Treatment Time also includes thorough review of current medical information, gathering information on past medical history/social history and prior level of function, informal observation of tasks, assessment of data and education on plan of care and goals.     Treatment Time In: 10:53 AM     Treatment Time Out: 11:17 AM          Treatment Charges: Mins Units   ADL/Home Mgt     53201 16 1   Thera Activities     01344 8 1   Ther Ex                 [id number]       Manual Therapy    37610     Neuro Re-ed         14586     Orthotic manage/training                               60510     Non Billable Time     Total Timed Treatment 24 [address] [facility], MENDES/L #[id number]

## 2023-02-22 NOTE — PROGRESS NOTES
Internal Medicine Progress Note    [facility]    Michelle Steele., ROXYOUmmI. Deloris Banks D.O., PETE Dean D.O. Ellene Headings, MSN, APRN, NP-C  Amanuel Page. Maureen Baxter, MSN, APRN-CNP     Primary Care Physician: Manuelito Portillo MD   Admitting Physician:  Nina Davis DO  Admission date and time: 2/17/2023  8:36 PM    Room:  [address] [place] [address]  Admitting diagnosis: SBO (small bowel obstruction) ([facility] [address] [K56.609]  Hyperglycemia [R73.9]  MAGALY (acute kidney injury) ([facility] [address] [N17.9]  Acute respiratory failure with hypoxia (HCC) [J96.01]  Elevated lactic acid level [R79.89]  Pneumonia of left lung due to infectious organism, unspecified part of lung [J18.9]    Patient Name: Mj Van  MRN: [id number]    Date of Service: 2/22/2023     Subjective: Rina Salgado is a 80 y.o. female who was seen and examined today,2/22/2023, at the bedside. Rina Salgado was resting in bed at time examination. States the patient has been sleeping and restful. When she does awaken she is pleasant and no issues. Patient states she does feel better. Does admit to fatigue. She was oriented to person and place at time of examination. No family present. Review of System:   Constitutional:   Denies fever or chills, weight loss or gain, admits to fatigue. HEENT:   Denies ear pain, sore throat, sinus or eye problems. Nasal cannula oxygen is in place. Cardiovascular:   Denies any chest pain, irregular heartbeats, or palpitations. Respiratory:   Shortness of breath continues to improve. Gastrointestinal:   Denies nausea, vomiting, diarrhea, or constipation. Mild postsurgical pain. Genitourinary:    Denies any urgency, frequency, hematuria. Voiding  without difficulty. Extremities:   Denies lower extremity swelling, edema or cyanosis.    Neurology:    Denies any headache or focal neurological deficits, admits to generalized weakness and deconditioning. Psch:   Denies being anxious or depressed. Musculoskeletal:    Denies  myalgias, joint complaints or back pain. Integumentary:   Denies any rashes, ulcers, or excoriations. Denies bruising. Hematologic/Lymphatic:  Denies bruising or bleeding. Physical Exam:  No intake/output data recorded. Intake/Output Summary (Last 24 hours) at 2/22/2023 1755  Last data filed at 2/22/2023 0543  Gross per 24 hour   Intake --   Output 200 ml   Net -200 ml   I/O last 3 completed shifts:  In: -   Out: 200 [Urine:200]  Patient Vitals for the past 96 hrs (Last 3 readings):   Weight   02/19/23 0500 112 lb 1.6 oz (50.8 kg)     Vital Signs:   Blood pressure (!) 162/72, pulse 70, temperature 98 °F (36.7 °C), temperature source Axillary, resp. rate 15, height 5' 5\" (1.651 m), weight 112 lb 1.6 oz (50.8 kg), SpO2 97 %, not currently breastfeeding. General appearance:  Awake and alert. Significantly better appearing. HEENT:  Grossly normal to visual inspection. Oxygen on via nasal cannula. Patient is hard of hearing. Neck:    Supple. Trachea midline. No thyromegaly. No JVD. No bruits. Heart:    Rhythm regular. Rate controlled. 1/6 murmurs. Pacemaker. Right chest.  Lungs:    Improving aeration throughout. Abdomen:   Soft. Non-tender. Non-distended. Bowel sounds positive. No organomegaly or masses. No pain on palpation. Right inguinal tenderness  Extremities:    Peripheral pulses present. No peripheral edema. No ulcers. No cyanosis. No clubbing. Neurologic:    Alert x 3. No focal deficit. Cranial nerves grossly intact. No focal weakness. Answers all questions accordingly and follows commands. Cross the midline without difficulty. Psych:   Behavior is normal. Mood appears normal. Speech is not rapid and/or pressured. Musculoskeletal:   Spine ROM normal. Muscular strength intact. Gait not assessed. Integumentary:  No rashes  Skin normal color and texture.   Bilateral lower extremities are balbina colored. Genitalia/Breast:  Posey catheter has been removed    Medication:  Scheduled Meds:   amoxicillin-clavulanate  1 tablet Oral 2 times per day    pantoprazole  40 mg Oral QAM AC    insulin lispro  0-8 Units SubCUTAneous TID WC    insulin lispro  0-4 Units SubCUTAneous Nightly    apixaban  2.5 mg Oral BID    docusate sodium  100 mg Oral Daily    senna  1 tablet Oral Nightly    bisacodyl  10 mg Rectal Once    arformoterol tartrate  15 mcg Nebulization BID    budesonide  0.5 mg Nebulization BID    metoprolol tartrate  12.5 mg Oral BID    sodium chloride flush  5-40 mL IntraVENous BID    albuterol  2.5 mg Nebulization Q4H    And    ipratropium  0.5 mg Nebulization Q4H     Continuous Infusions:   dextrose         Objective Data:  Recent Labs     02/21/23  0635 02/22/23  0640   WBC 8.3 8.6   RBC 3.47* 3.69   HGB 12.1 12.1   HCT 37.0 39.3   .6* 106.5*   MCH 34.9 32.8   MCHC 32.7 30.8*   RDW 13.7 13.3    152   MPV 12.1* 12.5*     Recent Labs     02/21/23  0635 02/22/23  0640    137   K 3.8 3.6    101   CO2 29 27   BUN 35* 22   CREATININE 0.8 0.7   GLUCOSE 146* 140*   CALCIUM 7.8* 7.5*   PROT 4.8* 4.6*   LABALBU 2.3* 2.3*   BILITOT 0.4 0.5   ALKPHOS 47 45   AST 19 21   ALT 10 12     No results found for: TROPONINI       Assessment:  Small bowel obstruction secondary to incarcerated right inguinal hernia status post laparoscopic repair 2/18/2023  Aspiration pneumonia  Acute respiratory failure with hypoxia  Coronary artery disease with status post pacemaker insertion with elevated troponin levels suggesting demand ischemia. Acute on chronic kidney disease stage IV  Essential hypertension. Hyperlipidemia  Minute atrial fibrillation      Plan: . Patient surgical pain is well controlled. Staff reported that the early morning hours of patient was lethargic and very weak therefore CT of the head was obtained.    Results revealed suggestion of chronic atrophic brain changes and chronic microvascular disease. There were areas of low-attenuation visualized within the ameena. Recommendation was for further evaluation with an MRI of the brain to rule out acute on chronic pathology. MRI was therefore obtained. Results are pending. We will hold discharge at this time. PT/OT to evaluate and treat. Continue antibiotic therapy. Attempt was made to wean the patient off oxygen therapy however her saturations did decline therefore she is currently on 1 L/min with saturations remaining in the 90s. Tolerating diet.  for discharge planning. Patient does have pre-CERT to [facility] today. However discharge was held secondary to change in the patient's mental status in the early hours      More than 50% of my  time was spent at the bedside counseling/coordinating care with the patient and/or family with face to face contact. This time was spent reviewing notes and laboratory data as well as instructing and counseling the patient. Time I spent with the family or surrogate(s) is included only if the patient was incapable of providing the necessary information or participating in medical decisions. I also discussed the differential diagnosis and all of the proposed management plans with the patient and individuals accompanying the patient. The patient was seen, examined and then discussed with Dr. Isidoro Infante. Dion Hayes, LEANDRA - LAUREN,   2/22/2023  5:58 PM        I saw and evaluated the patient. I agree with the findings and the plan of care as documented in Dion Hayes NP-C's  note.     Michael Gary DO, D.O., FACOI  6:06 PM  2/22/2023

## 2023-02-22 NOTE — PROGRESS NOTES
Cardiology  Progress Note      SUBJECTIVE:  No chest pain. No dyspnea. She was somewhat confused when I came to see her. Daughter was at bedside.     Current Inpatient Medications  Current Facility-Administered Medications: amoxicillin-clavulanate (AUGMENTIN) 875-125 MG per tablet 1 tablet, 1 tablet, Oral, 2 times per day  pantoprazole (PROTONIX) tablet 40 mg, 40 mg, Oral, QAM AC  glucose chewable tablet 16 g, 4 tablet, Oral, PRN  dextrose bolus 10% 125 mL, 125 mL, IntraVENous, PRN **OR** dextrose bolus 10% 250 mL, 250 mL, IntraVENous, PRN  glucagon (rDNA) injection 1 mg, 1 mg, SubCUTAneous, PRN  dextrose 10 % infusion, , IntraVENous, Continuous PRN  insulin lispro (HUMALOG) injection vial 0-8 Units, 0-8 Units, SubCUTAneous, TID WC  insulin lispro (HUMALOG) injection vial 0-4 Units, 0-4 Units, SubCUTAneous, Nightly  sodium phosphate 8.13 mmol in sodium chloride 0.9 % 250 mL IVPB, 0.16 mmol/kg, IntraVENous, PRN **OR** sodium phosphate 16.26 mmol in sodium chloride 0.9 % 250 mL IVPB, 0.32 mmol/kg, IntraVENous, PRN  magnesium sulfate 2000 mg in 50 mL IVPB premix, 2,000 mg, IntraVENous, PRN  potassium chloride (KLOR-CON M) extended release tablet 40 mEq, 40 mEq, Oral, PRN **OR** potassium bicarb-citric acid (EFFER-K) effervescent tablet 40 mEq, 40 mEq, Oral, PRN **OR** potassium chloride 10 mEq/100 mL IVPB (Peripheral Line), 10 mEq, IntraVENous, PRN  apixaban (ELIQUIS) tablet 2.5 mg, 2.5 mg, Oral, BID  docusate sodium (COLACE) capsule 100 mg, 100 mg, Oral, Daily  senna (SENOKOT) tablet 8.6 mg, 1 tablet, Oral, Nightly  bisacodyl (DULCOLAX) suppository 10 mg, 10 mg, Rectal, Once  arformoterol tartrate (BROVANA) nebulizer solution 15 mcg, 15 mcg, Nebulization, BID  budesonide (PULMICORT) nebulizer suspension 500 mcg, 0.5 mg, Nebulization, BID  metoprolol tartrate (LOPRESSOR) tablet 12.5 mg, 12.5 mg, Oral, BID  sodium chloride flush 0.9 % injection 5-40 mL, 5-40 mL, IntraVENous, BID  albuterol (PROVENTIL) nebulizer solution 2.5 mg, 2.5 mg, Nebulization, Q4H **AND** ipratropium (ATROVENT) 0.02 % nebulizer solution 0.5 mg, 0.5 mg, Nebulization, Q4H      Physical  VITALS:  BP (!) 162/72   Pulse 70   Temp 98 °F (36.7 °C) (Axillary)   Resp 15   Ht 5' 5\" (1.651 m)   Wt 112 lb 1.6 oz (50.8 kg)   SpO2 97%   BMI 18.65 kg/m²   CURRENT TEMPERATURE:  Temp: 98 °F (36.7 °C)  CONSTITUTIONAL: No acute distress. EYES: Vision is intact. ENT: No sore throat. No ear drainage. NECK: No JVD. BACK: Symmetric. LUNGS:  diminished breath sounds left base  CARDIOVASCULAR:  normal S1 and S2, no S3, and no S4  ABDOMEN:  non-distended  NEUROLOGIC: No focal deficits. EXTREMITIES: No edema cyanosis or clubbing. DATA:      ECG:  I have reviewed EKG with the following interpretation:  normal sinus rhythm    Cardiology Labs:  BMP:    Lab Results   Component Value Date/Time     02/22/2023 06:40 AM    K 3.6 02/22/2023 06:40 AM    K 4.6 02/17/2023 10:01 PM     02/22/2023 06:40 AM    CO2 27 02/22/2023 06:40 AM    BUN 22 02/22/2023 06:40 AM     CBC:    Lab Results   Component Value Date/Time    WBC 8.6 02/22/2023 06:40 AM    RBC 3.69 02/22/2023 06:40 AM    HGB 12.1 02/22/2023 06:40 AM    HCT 39.3 02/22/2023 06:40 AM    .5 02/22/2023 06:40 AM    RDW 13.3 02/22/2023 06:40 AM     02/22/2023 06:40 AM     PT/INR:  No results found for: PTINR  TROPONIN:  No components found for: TROP    ASSESSMENT      1.elevation of high sensitivity troponin. Patient had downtrending troponin elevation starting at 167 going down to 120 and then to 59. This is mostly demand ischemia from her GI symptoms with type II non-STEMI. Patient is 27-year-old lady. EKG is nondiagnostic due to the presence of paced rhythm. No need for coronary workup at this point. 2.sick sinus syndrome / paroxysmal atrial fibrillation. Status post permanent pacemaker in the past.  On Eliquis. Patient was in AV paced rhythm in my office last month.   EKG on admission is consistent with atrial fibrillation with ventricular paced rhythm. 3.small bowel obstruction. Clinically better. NG tube was pulled out. Long discussion with daughter at bedside about all above. All her questions were answered.

## 2023-02-22 NOTE — PROGRESS NOTES
Physical Therapy Treatment Note/Plan of Care    Room #:  1204/8966-68  Patient Name: Bettye Morejon  YOB: 1929  MRN: [id number]    Date of Service: 2/22/2023     Tentative placement recommendation: Subacute Rehab  Equipment recommendation: To be determined      Evaluating Physical Therapist: Bailey Adkins, PT #[id number]      Specific Provider Orders/Date/Referring Provider :  02/20/23 0945    PT eval and treat  Start:  02/20/23 0945,   End:  02/20/23 0945,   ONE TIME,   Standing Count:  1 Occurrences,   R         Cleo Odor, DO     Admitting Diagnosis:   SBO (small bowel obstruction) ([facility] [K56.609]  Hyperglycemia [R73.9]  MAGALY (acute kidney injury) ([address] [N17.9]  Acute respiratory failure with hypoxia (HCC) [J96.01]  Elevated lactic acid level [R79.89]  Pneumonia of left lung due to infectious organism, unspecified part of lung [J18.9]     nausea and vomiting  Surgery:   DATE OF PROCEDURE: 2/18/2023  OPERATION: Diagnostic laparoscopy, release small bowel obstruction, incarcerated strangulated right inguinal hernia repair with mesh. SURGEON: Geetha Laguna MD  Visit Diagnoses         Codes    Pneumonia of left lung due to infectious organism, unspecified part of lung    -  Primary J18.9    MAGALY (acute kidney injury) ([facility] [address]     N17.9    Elevated lactic acid level     R79.89    Hyperglycemia     R73.9    Acute respiratory failure with hypoxia ([facility] [address]     J96.01            Patient Active Problem List   Diagnosis    Substernal thyroid    Right cataract    Left cataract    SBO (small bowel obstruction) (HCC)    Pneumonia of left lung due to infectious organism    Incarcerated inguinal hernia, unilateral        ASSESSMENT of Current Deficits Patient exhibits decreased strength, balance, and endurance impairing functional mobility, transfers, gait , gait distance, and tolerance to activity. Pt more lethargic today. Pt reports \"i'm not sure what is reality anymore. \" Pt able to take steps to transport cart, session ended early due to pt going down for testing. The patient will benefit from continued skilled therapy to increase strength and improve balance for safe functional mobility, to decrease risk of falls, and to meet goals at discharge. PHYSICAL THERAPY  PLAN OF CARE       Physical therapy plan of care is established based on physician order,  patient diagnosis and clinical assessment    Current Treatment Recommendations:    -Bed Mobility: Lower extremity exercises   -Sitting Balance: Incorporate reaching activities to activate trunk muscles   -Standing Balance: Perform strengthening exercises in standing to promote motor control with or without upper extremity support   -Transfers: Provide instruction on proper hand and foot position for adequate transfer of weight onto lower extremities and use of gait device if needed, Cues for hand placement, technique and safety. Provide stabilization to prevent fall , and Assist with extension of knees trunk and hip to accept weight transfer   -Gait: Gait training, Standing activities to improve: base of support, weight shift, weight bearing , and Pregait training to emphasize: Device control, Upright, Safety, and pacing   -Endurance: Utilize Supervised activities to increase level of endurance to allow for safe functional mobility including transfers and gait  and Use graduated activities to promote good breathing techniques and provide support and education to maximize respiratory function    PT long term treatment goals are located in below grid    Patient and or family understand(s) diagnosis, prognosis, and plan of care. Frequency of treatments: Patient will be seen  daily.          Prior Level of Function: Patient ambulated independently, with wheeled walker for outdoor and community distances    Rehab Potential: good   for baseline    Past medical history:   Past Medical History:   Diagnosis Date    GERD (gastroesophageal reflux disease)     Hearing impaired Hyperlipidemia     Hypertension     Small bowel obstruction ([facility] [address]     Thyroid disease      Past Surgical History:   Procedure Laterality Date    APPENDECTOMY      CATARACT REMOVAL WITH IMPLANT Right 04/15/2014    CATARACT REMOVAL WITH IMPLANT Left 11/15/2016    COLECTOMY N/A 2/18/2023    LAPAROSCOPIC INCARCERATED STRANGULATED RIGHT INGUINAL HERNIA REPAIR WITH MESH performed by Geetha Laguna MD at [address] ([facility])      LAPAROTOMY  02/18/2023    release sbo incarcerated strangulated right inguinal hernia with mesh repair    OTHER SURGICAL HISTORY  08/2012    RIGHT HEMITHYROIDECTOMY    PACEMAKER INSERTION      PACEMAKER PLACEMENT  1995    [employer]        SUBJECTIVE:    Precautions: Use lift equipment for lifting patient  and Please assist the patient at the bedside chair for the majority of the day , falls and alarm , s/p laparoscopy, release small bowel obstruction, incarcerated strangulated right inguinal hernia repair with mesh. Imaging results: CT ABDOMEN PELVIS WO CONTRAST Additional Contrast? None    Result Date: 2/18/2023  EXAMINATION: CT OF THE ABDOMEN AND PELVIS WITHOUT CONTRAST 2/18/2023 9:27 am    Small bowel obstruction secondary to a right inguinal hernia. The degree of small bowel dilation is stable to slightly increased compared with CT from yesterday. No evidence of bowel ischemia at this time. Small bilateral pleural effusions with bibasilar atelectasis and somewhat consolidative airspace opacities in the lingula. Lingular opacities may be secondary to atelectasis, pneumonia and or aspiration. CT ABDOMEN PELVIS WO CONTRAST Additional Contrast? None    Result Date: 2/17/2023  EXAMINATION: CT OF THE ABDOMEN AND PELVIS WITHOUT CONTRAST 2/17/2023 11:00 pm    Small-bowel obstruction due to a right inguinal hernia.      CT CHEST WO CONTRAST    Result Date: 2/17/2023  EXAMINATION: CT OF THE CHEST WITHOUT CONTRAST 2/17/2023 11:00 pm    Ill-defined opacities left lower and upper lobes are consistent with infiltrates, and to a lesser degree is not excluded in the right lower lobe. XR CHEST PORTABLE    Result Date: 2/17/2023  EXAMINATION: ONE XRAY VIEW OF THE CHEST 2/17/2023 9:18 pm    Left lower lung alveolar infiltrate consistent with pneumonia. Small bilateral pleural effusions. XR ABDOMEN FOR NG/OG/NE TUBE PLACEMENT    Result Date: 2/18/2023  EXAMINATION: ONE SUPINE XRAY VIEW(S) OF THE ABDOMEN 2/18/2023 2:45 am    Gastric tube in good position. RECOMMENDATION: Careful clinical correlation and follow up recommended. Social history: Patient lives with son Adeola Keane in a ranch home  with No steps  to enter home. Tub shower  suction cup grab bar, rail clamped onto tub    Equipment owned: Janet Ward, Bedside commode, and Shower chair,       AM-PAC Basic Mobility        AM-PAC Basic Mobility - Inpatient   How much help is needed turning from your back to your side while in a flat bed without using bedrails?: A Little  How much help is needed moving from lying on your back to sitting on the side of a flat bed without using bedrails?: A Lot  How much help is needed moving to and from a bed to a chair?: A Lot  How much help is needed standing up from a chair using your arms?: A Lot  How much help is needed walking in hospital room?: A Little  How much help is needed climbing 3-5 steps with a railing?: A Lot  AM-PAC Inpatient Mobility Raw Score : 14  AM-PAC Inpatient T-Scale Score : 38.1  Mobility Inpatient CMS 0-100% Score: 61.29  Mobility Inpatient CMS G-Code Modifier : CL    Nursing cleared patient for PT treatment. .    OBJECTIVE;   Initial Evaluation  Date: 2/21/2023 Treatment Date:    2/22/2023   Short Term/ Long Term   Goals   Was pt agreeable to Eval/treatment? Yes Yes To be met in 3 days   Pain level   0/10    0/10    Bed Mobility    Rolling:  Moderate assist of 1    Supine to sit: Moderate assist of 1  log roll  Sit to supine: Not assessed Scooting: Moderate assist of 1   Rolling: Moderate assist of 1   Supine to sit: Moderate assist of 1   Sit to supine: Moderate assist of 1   Scooting: Moderate assist of 1    Rolling: Supervision     Supine to sit: Supervision     Sit to supine: Supervision     Scooting: Supervision      Transfers Sit to stand: Moderate assist of 1 Cues for hand placement and safety  Sit to stand:  Moderate assist of 1 cues for hand and foot placement    Sit to stand: Supervision      Ambulation     2 x 50 feet using  wheeled walker with Minimal assist of 1   for walker control and safety and cues for upright posture, safety, and pacing slow ashley increased Right lower extremity external rotation 10 feet using  wheeled walker with Moderate assist of 1   for balance, multiplane instability, and safety and cues for upright posture, walker approximation, and safety     75 feet using  wheeled walker with Supervision     Stair negotiation: ascended and descended   Not assessed          ROM Within functional limits    Increase range of motion 10% of affected joints    Strength BUE:  refer to OT eval  RLE:  3+/5  LLE:  3+/5  Increase strength in affected mm groups by 1/3 grade   Balance Sitting EOB:  good -  Dynamic Standing:  fair wheeled walker  Sitting EOB: fair +  Dynamic Standing: fair    Sitting EOB:  good    Dynamic Standing: fair +wheeled walker      Patient is Alert & Oriented x person, place, time, and situation and follows directions    Sensation:  Patient  denies numbness/tingling   Edema:  yes bilateral upper extremities   Endurance: fair       Vitals:  2 liters nasal cannula   Blood Pressure at rest  Blood Pressure during session    Heart Rate at rest  Heart Rate during session    SPO2 at rest%  SPO2 during session      Patient education  Patient educated on role of Physical Therapy, risks of immobility, safety and plan of care,  importance of mobility while in hospital , purse lip breathing, and ankle pumps, quad set and glut set for edema control, blood clot prevention     Patient response to education:   Pt verbalized understanding Pt demonstrated skill Pt requires further education in this area   Yes Partial Yes      Treatment:  Patient practiced and was instructed/facilitated in the following treatment: Patient assisted to edge of bed,   Sat edge of bed 4 minutes with Minimal assist of 1 to increase dynamic sitting balance and activity tolerance. Pt stood and ambulated to transport cart. Pt assisted onto transport cart. Therapeutic Exercises:  not performed        At end of session, patient in bed with  call light and phone within reach,  all lines and tubes intact, nursing notified. Patient would benefit from continued skilled Physical Therapy to improve functional independence and quality of life.          Patient's/ family goals   rehab    Time in 0936  Time out 0945    Total Treatment Time  9 minutes      CPT codes:  Therapeutic activities (49950)   9 minutes  1 unit(s)    Estela Chaparro, [address] [facility] #[id number]

## 2023-02-22 NOTE — PROGRESS NOTES
GENERAL SURGERY  DAILY PROGRESS NOTE  2/22/2023  Chief Complaint   Patient presents with    Emesis     [place] and vomiting x 3 days; dark brown vomit per family after trying chicken broth today; denies fever or chills; no diarrhea; went to doctors yesterday and sent home with dx of stomach bug  Not diabetic,          Subjective:  Doing well. No acute events. Tolerating a diet and having bowel function    Objective:  BP (!) 159/71   Pulse 70   Temp 97.7 °F (36.5 °C) (Axillary)   Resp 16   Ht 5' 5\" (1.651 m)   Wt 112 lb 1.6 oz (50.8 kg)   SpO2 98%   BMI 18.65 kg/m²     GENERAL:  Laying in bed, awake, follows commands  HEAD: Normocephalic, atraumatic  EYES: No sclera icterus, pupils equal  LUNGS:  No increased work of breathing  CARDIOVASCULAR:  regular rate  ABDOMEN:  Soft, appropriately tender, nondistended, incisions clean dry and intact  EXTREMITIES: No edema or swelling  SKIN: Warm and dry, no rashes or lesions  Neuro: Moves all 4 extremities to command with 5 out of 5 strength. GCS 15.   Pupils equal round reactive    Assessment/Plan:  80 y.o. female admitted with an incarcerated right inguinal hernia status post laparoscopic repair on 2/18/2023    Continue diet  Okay for discharge from surgical perspective    Electronically signed by Hong Rodriguez MD on 2/22/2023 at 6:45 AM

## 2023-02-23 VITALS
HEIGHT: 65 IN | TEMPERATURE: 99 F | OXYGEN SATURATION: 95 % | RESPIRATION RATE: 16 BRPM | HEART RATE: 70 BPM | SYSTOLIC BLOOD PRESSURE: 135 MMHG | BODY MASS INDEX: 18.68 KG/M2 | DIASTOLIC BLOOD PRESSURE: 57 MMHG | WEIGHT: 112.1 LBS

## 2023-02-23 LAB
ALBUMIN SERPL-MCNC: 2.3 G/DL (ref 3.5–5.2)
ALP BLD-CCNC: 47 U/L (ref 35–104)
ALT SERPL-CCNC: 18 U/L (ref 0–32)
ANION GAP SERPL CALCULATED.3IONS-SCNC: 9 MMOL/L (ref 7–16)
AST SERPL-CCNC: 26 U/L (ref 0–31)
BASOPHILS ABSOLUTE: 0.02 E9/L (ref 0–0.2)
BASOPHILS RELATIVE PERCENT: 0.2 % (ref 0–2)
BILIRUB SERPL-MCNC: 0.5 MG/DL (ref 0–1.2)
BLOOD CULTURE, ROUTINE: NORMAL
BUN BLDV-MCNC: 15 MG/DL (ref 6–23)
CALCIUM SERPL-MCNC: 7.5 MG/DL (ref 8.6–10.2)
CHLORIDE BLD-SCNC: 99 MMOL/L (ref 98–107)
CO2: 27 MMOL/L (ref 22–29)
CREAT SERPL-MCNC: 0.6 MG/DL (ref 0.5–1)
EOSINOPHILS ABSOLUTE: 0.02 E9/L (ref 0.05–0.5)
EOSINOPHILS RELATIVE PERCENT: 0.2 % (ref 0–6)
GFR SERPL CREATININE-BSD FRML MDRD: >60 ML/MIN/1.73
GLUCOSE BLD-MCNC: 129 MG/DL (ref 74–99)
HCT VFR BLD CALC: 36 % (ref 34–48)
HEMOGLOBIN: 12 G/DL (ref 11.5–15.5)
IMMATURE GRANULOCYTES #: 0.14 E9/L
IMMATURE GRANULOCYTES %: 1.7 % (ref 0–5)
LYMPHOCYTES ABSOLUTE: 0.98 E9/L (ref 1.5–4)
LYMPHOCYTES RELATIVE PERCENT: 12.2 % (ref 20–42)
MCH RBC QN AUTO: 34.8 PG (ref 26–35)
MCHC RBC AUTO-ENTMCNC: 33.3 % (ref 32–34.5)
MCV RBC AUTO: 104.3 FL (ref 80–99.9)
METER GLUCOSE: 144 MG/DL (ref 74–99)
METER GLUCOSE: 158 MG/DL (ref 74–99)
MONOCYTES ABSOLUTE: 0.57 E9/L (ref 0.1–0.95)
MONOCYTES RELATIVE PERCENT: 7.1 % (ref 2–12)
NEUTROPHILS ABSOLUTE: 6.33 E9/L (ref 1.8–7.3)
NEUTROPHILS RELATIVE PERCENT: 78.6 % (ref 43–80)
PDW BLD-RTO: 13.4 FL (ref 11.5–15)
PLATELET # BLD: 147 E9/L (ref 130–450)
PMV BLD AUTO: 12.3 FL (ref 7–12)
POTASSIUM SERPL-SCNC: 3.9 MMOL/L (ref 3.5–5)
RBC # BLD: 3.45 E12/L (ref 3.5–5.5)
SODIUM BLD-SCNC: 135 MMOL/L (ref 132–146)
TOTAL PROTEIN: 4.5 G/DL (ref 6.4–8.3)
WBC # BLD: 8.1 E9/L (ref 4.5–11.5)

## 2023-02-23 PROCEDURE — 6360000002 HC RX W HCPCS: Performed by: SURGERY

## 2023-02-23 PROCEDURE — 97530 THERAPEUTIC ACTIVITIES: CPT

## 2023-02-23 PROCEDURE — 2580000003 HC RX 258: Performed by: INTERNAL MEDICINE

## 2023-02-23 PROCEDURE — 82962 GLUCOSE BLOOD TEST: CPT

## 2023-02-23 PROCEDURE — 2700000000 HC OXYGEN THERAPY PER DAY

## 2023-02-23 PROCEDURE — 6360000002 HC RX W HCPCS: Performed by: INTERNAL MEDICINE

## 2023-02-23 PROCEDURE — 85025 COMPLETE CBC W/AUTO DIFF WBC: CPT

## 2023-02-23 PROCEDURE — 36415 COLL VENOUS BLD VENIPUNCTURE: CPT

## 2023-02-23 PROCEDURE — 6370000000 HC RX 637 (ALT 250 FOR IP): Performed by: INTERNAL MEDICINE

## 2023-02-23 PROCEDURE — 6370000000 HC RX 637 (ALT 250 FOR IP): Performed by: SURGERY

## 2023-02-23 PROCEDURE — 94640 AIRWAY INHALATION TREATMENT: CPT

## 2023-02-23 PROCEDURE — 97110 THERAPEUTIC EXERCISES: CPT

## 2023-02-23 PROCEDURE — 80053 COMPREHEN METABOLIC PANEL: CPT

## 2023-02-23 RX ORDER — SENNA PLUS 8.6 MG/1
1 TABLET ORAL NIGHTLY
Qty: 30 TABLET | Refills: 0 | DISCHARGE
Start: 2023-02-23 | End: 2023-03-25

## 2023-02-23 RX ORDER — AMOXICILLIN AND CLAVULANATE POTASSIUM 875; 125 MG/1; MG/1
1 TABLET, FILM COATED ORAL EVERY 12 HOURS SCHEDULED
Qty: 10 TABLET | Refills: 0 | DISCHARGE
Start: 2023-02-23 | End: 2023-02-28

## 2023-02-23 RX ORDER — PSEUDOEPHEDRINE HCL 30 MG
100 TABLET ORAL DAILY
DISCHARGE
Start: 2023-02-23

## 2023-02-23 RX ORDER — PANTOPRAZOLE SODIUM 40 MG/1
40 TABLET, DELAYED RELEASE ORAL
Qty: 30 TABLET | Refills: 3 | DISCHARGE
Start: 2023-02-24

## 2023-02-23 RX ADMIN — BUDESONIDE 500 MCG: 0.5 INHALANT RESPIRATORY (INHALATION) at 06:01

## 2023-02-23 RX ADMIN — IPRATROPIUM BROMIDE 0.5 MG: 0.5 SOLUTION RESPIRATORY (INHALATION) at 06:01

## 2023-02-23 RX ADMIN — ALBUTEROL SULFATE 2.5 MG: 2.5 SOLUTION RESPIRATORY (INHALATION) at 06:01

## 2023-02-23 RX ADMIN — DOCUSATE SODIUM 100 MG: 100 CAPSULE, LIQUID FILLED ORAL at 09:23

## 2023-02-23 RX ADMIN — ALBUTEROL SULFATE 2.5 MG: 2.5 SOLUTION RESPIRATORY (INHALATION) at 14:29

## 2023-02-23 RX ADMIN — ARFORMOTEROL TARTRATE 15 MCG: 15 SOLUTION RESPIRATORY (INHALATION) at 06:01

## 2023-02-23 RX ADMIN — IPRATROPIUM BROMIDE 0.5 MG: 0.5 SOLUTION RESPIRATORY (INHALATION) at 14:29

## 2023-02-23 RX ADMIN — ALBUTEROL SULFATE 2.5 MG: 2.5 SOLUTION RESPIRATORY (INHALATION) at 10:27

## 2023-02-23 RX ADMIN — IPRATROPIUM BROMIDE 0.5 MG: 0.5 SOLUTION RESPIRATORY (INHALATION) at 10:27

## 2023-02-23 RX ADMIN — APIXABAN 2.5 MG: 5 TABLET, FILM COATED ORAL at 09:23

## 2023-02-23 RX ADMIN — METOPROLOL TARTRATE 12.5 MG: 25 TABLET, FILM COATED ORAL at 09:23

## 2023-02-23 RX ADMIN — Medication 10 ML: at 09:23

## 2023-02-23 RX ADMIN — PANTOPRAZOLE SODIUM 40 MG: 40 TABLET, DELAYED RELEASE ORAL at 05:28

## 2023-02-23 RX ADMIN — AMOXICILLIN AND CLAVULANATE POTASSIUM 1 TABLET: 875; 125 TABLET, FILM COATED ORAL at 09:24

## 2023-02-23 NOTE — PROGRESS NOTES
Cardiology  Progress Note      SUBJECTIVE:  No chest pain. No dyspnea. Patient was sitting up in bed when I came to see her. Flat affect. She appears to have some confusion.     Current Inpatient Medications  Current Facility-Administered Medications: amoxicillin-clavulanate (AUGMENTIN) 875-125 MG per tablet 1 tablet, 1 tablet, Oral, 2 times per day  pantoprazole (PROTONIX) tablet 40 mg, 40 mg, Oral, QAM AC  glucose chewable tablet 16 g, 4 tablet, Oral, PRN  dextrose bolus 10% 125 mL, 125 mL, IntraVENous, PRN **OR** dextrose bolus 10% 250 mL, 250 mL, IntraVENous, PRN  glucagon (rDNA) injection 1 mg, 1 mg, SubCUTAneous, PRN  dextrose 10 % infusion, , IntraVENous, Continuous PRN  insulin lispro (HUMALOG) injection vial 0-8 Units, 0-8 Units, SubCUTAneous, TID WC  insulin lispro (HUMALOG) injection vial 0-4 Units, 0-4 Units, SubCUTAneous, Nightly  sodium phosphate 8.13 mmol in sodium chloride 0.9 % 250 mL IVPB, 0.16 mmol/kg, IntraVENous, PRN **OR** sodium phosphate 16.26 mmol in sodium chloride 0.9 % 250 mL IVPB, 0.32 mmol/kg, IntraVENous, PRN  magnesium sulfate 2000 mg in 50 mL IVPB premix, 2,000 mg, IntraVENous, PRN  potassium chloride (KLOR-CON M) extended release tablet 40 mEq, 40 mEq, Oral, PRN **OR** potassium bicarb-citric acid (EFFER-K) effervescent tablet 40 mEq, 40 mEq, Oral, PRN **OR** potassium chloride 10 mEq/100 mL IVPB (Peripheral Line), 10 mEq, IntraVENous, PRN  apixaban (ELIQUIS) tablet 2.5 mg, 2.5 mg, Oral, BID  docusate sodium (COLACE) capsule 100 mg, 100 mg, Oral, Daily  senna (SENOKOT) tablet 8.6 mg, 1 tablet, Oral, Nightly  bisacodyl (DULCOLAX) suppository 10 mg, 10 mg, Rectal, Once  arformoterol tartrate (BROVANA) nebulizer solution 15 mcg, 15 mcg, Nebulization, BID  budesonide (PULMICORT) nebulizer suspension 500 mcg, 0.5 mg, Nebulization, BID  metoprolol tartrate (LOPRESSOR) tablet 12.5 mg, 12.5 mg, Oral, BID  sodium chloride flush 0.9 % injection 5-40 mL, 5-40 mL, IntraVENous, BID  albuterol (PROVENTIL) nebulizer solution 2.5 mg, 2.5 mg, Nebulization, Q4H **AND** ipratropium (ATROVENT) 0.02 % nebulizer solution 0.5 mg, 0.5 mg, Nebulization, Q4H      Physical  VITALS:  BP (!) 135/57   Pulse 70   Temp 99 °F (37.2 °C) (Oral)   Resp 16   Ht 5' 5\" (1.651 m)   Wt 112 lb 1.6 oz (50.8 kg)   SpO2 95%   BMI 18.65 kg/m²   CURRENT TEMPERATURE:  Temp: 99 °F (37.2 °C)  CONSTITUTIONAL: No acute distress. EYES: Vision is intact. ENT: No sore throat. No ear drainage. NECK: No JVD. BACK: Symmetric. LUNGS:  diminished breath sounds left base  CARDIOVASCULAR:  normal S1 and S2, no S3, and murmurs include systolic murmur I/VI located at left sternal border without radiation  ABDOMEN:  non-distended  NEUROLOGIC: No focal deficits. EXTREMITIES: No edema cyanosis or clubbing. DATA:          Cardiology Labs:  BMP:    Lab Results   Component Value Date/Time     02/23/2023 07:21 AM    K 3.9 02/23/2023 07:21 AM    K 4.6 02/17/2023 10:01 PM    CL 99 02/23/2023 07:21 AM    CO2 27 02/23/2023 07:21 AM    BUN 15 02/23/2023 07:21 AM     CBC:    Lab Results   Component Value Date/Time    WBC 8.1 02/23/2023 07:21 AM    RBC 3.45 02/23/2023 07:21 AM    HGB 12.0 02/23/2023 07:21 AM    HCT 36.0 02/23/2023 07:21 AM    .3 02/23/2023 07:21 AM    RDW 13.4 02/23/2023 07:21 AM     02/23/2023 07:21 AM     PT/INR:  No results found for: PTINR  TROPONIN:  No components found for: TROP    ASSESSMENT    1.elevation of high sensitivity troponin. Patient had downtrending troponin elevation starting at 167 going down to 120 and then to 59. This is mostly demand ischemia from her GI symptoms with type II non-STEMI. Patient is 77-year-old lady. EKG is nondiagnostic due to the presence of paced rhythm. No need for coronary workup at this point. 2.sick sinus syndrome / paroxysmal atrial fibrillation. Status post permanent pacemaker in the past.  On Eliquis.   Patient was in AV paced rhythm in my office last month.  EKG on admission is consistent with atrial fibrillation with ventricular paced rhythm. 3.small bowel obstruction. Clinically better. NG tube was pulled out. Long discussion with daughter at bedside about all above. All her questions were answered.

## 2023-02-23 NOTE — PROGRESS NOTES
Physical Therapy Treatment Note/Plan of Care    Room #:  6336/2596-22  Patient Name: Phoenix Saenz  YOB: 1929  MRN: [id number]    Date of Service: 2/23/2023     Tentative placement recommendation: Subacute Rehab  Equipment recommendation: To be determined      Evaluating Physical Therapist: Danelle Pacheco PT #[id number]      Specific Provider Orders/Date/Referring Provider :  02/20/23 0945    PT eval and treat  Start:  02/20/23 0945,   End:  02/20/23 0945,   ONE TIME,   Standing Count:  1 Occurrences,   R         Elle Yen DO     Admitting Diagnosis:   SBO (small bowel obstruction) ([facility] [K56.609]  Hyperglycemia [R73.9]  MAGALY (acute kidney injury) ([facility] [N17.9]  Acute respiratory failure with hypoxia (HCC) [J96.01]  Elevated lactic acid level [R79.89]  Pneumonia of left lung due to infectious organism, unspecified part of lung [J18.9]     nausea and vomiting  Surgery:   DATE OF PROCEDURE: 2/18/2023  OPERATION: Diagnostic laparoscopy, release small bowel obstruction, incarcerated strangulated right inguinal hernia repair with mesh. SURGEON: Narcisa Gonzalez MD  Visit Diagnoses         Codes    Pneumonia of left lung due to infectious organism, unspecified part of lung    -  Primary J18.9    MAGALY (acute kidney injury) ([facility]     N17.9    Elevated lactic acid level     R79.89    Hyperglycemia     R73.9    Acute respiratory failure with hypoxia ([facility]     J96.01            Patient Active Problem List   Diagnosis    Substernal thyroid    Right cataract    Left cataract    SBO (small bowel obstruction) (HCC)    Pneumonia of left lung due to infectious organism    Incarcerated inguinal hernia, unilateral        ASSESSMENT of Current Deficits Patient exhibits decreased strength, balance, and endurance impairing functional mobility, transfers, gait , gait distance, and tolerance to activity. RUE excessively weeping fluid limiting gait due to risk of falls.  Patient became dizzy upon standing on her third stand, BP was taken as in chart. The patient will benefit from continued skilled therapy to increase strength and improve balance for safe functional mobility, to decrease risk of falls, and to meet goals at discharge. PHYSICAL THERAPY  PLAN OF CARE       Physical therapy plan of care is established based on physician order,  patient diagnosis and clinical assessment    Current Treatment Recommendations:    -Bed Mobility: Lower extremity exercises   -Sitting Balance: Incorporate reaching activities to activate trunk muscles   -Standing Balance: Perform strengthening exercises in standing to promote motor control with or without upper extremity support   -Transfers: Provide instruction on proper hand and foot position for adequate transfer of weight onto lower extremities and use of gait device if needed, Cues for hand placement, technique and safety. Provide stabilization to prevent fall , and Assist with extension of knees trunk and hip to accept weight transfer   -Gait: Gait training, Standing activities to improve: base of support, weight shift, weight bearing , and Pregait training to emphasize: Device control, Upright, Safety, and pacing   -Endurance: Utilize Supervised activities to increase level of endurance to allow for safe functional mobility including transfers and gait  and Use graduated activities to promote good breathing techniques and provide support and education to maximize respiratory function    PT long term treatment goals are located in below grid    Patient and or family understand(s) diagnosis, prognosis, and plan of care. Frequency of treatments: Patient will be seen  daily.          Prior Level of Function: Patient ambulated independently, with wheeled walker for outdoor and community distances    Rehab Potential: good   for baseline    Past medical history:   Past Medical History:   Diagnosis Date    GERD (gastroesophageal reflux disease)     Hearing impaired     Hyperlipidemia Hypertension     Small bowel obstruction ([facility]     Thyroid disease      Past Surgical History:   Procedure Laterality Date    APPENDECTOMY      CATARACT REMOVAL WITH IMPLANT Right 04/15/2014    CATARACT REMOVAL WITH IMPLANT Left 11/15/2016    COLECTOMY N/A 2/18/2023    LAPAROSCOPIC INCARCERATED STRANGULATED RIGHT INGUINAL HERNIA REPAIR WITH MESH performed by Neftali Little MD at [address] ([address] [facility])      LAPAROTOMY  02/18/2023    release sbo incarcerated strangulated right inguinal hernia with mesh repair    OTHER SURGICAL HISTORY  08/2012    RIGHT HEMITHYROIDECTOMY    PACEMAKER INSERTION      PACEMAKER PLACEMENT  1995    [place]        SUBJECTIVE:    Precautions: Use lift equipment for lifting patient  and Please assist the patient at the bedside chair for the majority of the day , falls and alarm , s/p laparoscopy, release small bowel obstruction, incarcerated strangulated right inguinal hernia repair with mesh. Imaging results: CT ABDOMEN PELVIS WO CONTRAST Additional Contrast? None    Result Date: 2/18/2023  EXAMINATION: CT OF THE ABDOMEN AND PELVIS WITHOUT CONTRAST 2/18/2023 9:27 am    Small bowel obstruction secondary to a right inguinal hernia. The degree of small bowel dilation is stable to slightly increased compared with CT from yesterday. No evidence of bowel ischemia at this time. Small bilateral pleural effusions with bibasilar atelectasis and somewhat consolidative airspace opacities in the lingula. Lingular opacities may be secondary to atelectasis, pneumonia and or aspiration. CT ABDOMEN PELVIS WO CONTRAST Additional Contrast? None    Result Date: 2/17/2023  EXAMINATION: CT OF THE ABDOMEN AND PELVIS WITHOUT CONTRAST 2/17/2023 11:00 pm    Small-bowel obstruction due to a right inguinal hernia.      CT CHEST WO CONTRAST    Result Date: 2/17/2023  EXAMINATION: CT OF THE CHEST WITHOUT CONTRAST 2/17/2023 11:00 pm    Ill-defined opacities left lower and upper lobes are consistent with infiltrates, and to a lesser degree is not excluded in the right lower lobe. XR CHEST PORTABLE    Result Date: 2/17/2023  EXAMINATION: ONE XRAY VIEW OF THE CHEST 2/17/2023 9:18 pm    Left lower lung alveolar infiltrate consistent with pneumonia. Small bilateral pleural effusions. XR ABDOMEN FOR NG/OG/NE TUBE PLACEMENT    Result Date: 2/18/2023  EXAMINATION: ONE SUPINE XRAY VIEW(S) OF THE ABDOMEN 2/18/2023 2:45 am    Gastric tube in good position. RECOMMENDATION: Careful clinical correlation and follow up recommended. Social history: Patient lives with son Vineet Gonzalez in a ranch home  with No steps  to enter home. Tub shower  suction cup grab bar, rail clamped onto tub    Equipment owned: [employer], Bedside commode, and Shower chair,       AM-PAC Basic Mobility        AM-PAC Basic Mobility - Inpatient   How much help is needed turning from your back to your side while in a flat bed without using bedrails?: A Lot  How much help is needed moving from lying on your back to sitting on the side of a flat bed without using bedrails?: A Lot  How much help is needed moving to and from a bed to a chair?: A Lot  How much help is needed standing up from a chair using your arms?: A Lot  How much help is needed walking in hospital room?: A Lot  How much help is needed climbing 3-5 steps with a railing?: Total  AM-PAC Inpatient Mobility Raw Score : 11  AM-PAC Inpatient T-Scale Score : 33.86  Mobility Inpatient CMS 0-100% Score: 72.57  Mobility Inpatient CMS G-Code Modifier : CL    Nursing cleared patient for PT treatment. .  States she just \"feels so very weak\"  OBJECTIVE;   Initial Evaluation  Date: 2/21/2023 Treatment Date:    2/23/2023   Short Term/ Long Term   Goals   Was pt agreeable to Eval/treatment? Yes Yes To be met in 3 days   Pain level   0/10    0/10    Bed Mobility    Rolling:  Moderate assist of 1    Supine to sit: Moderate assist of 1  log roll  Sit to supine: Not assessed     Scooting: Moderate assist of 1   Rolling: Moderate assist of 1   Supine to sit: Moderate assist of 1   Sit to supine: Not assessed patient in chair   Scooting: Moderate assist of 1 with chux   Rolling: Supervision     Supine to sit: Supervision     Sit to supine: Supervision     Scooting: Supervision      Transfers Sit to stand: Moderate assist of 1 Cues for hand placement and safety  Sit to stand:  Moderate assist of 1 cues for hand and foot placement    Sit to stand: Supervision      Ambulation     2 x 50 feet using  wheeled walker with Minimal assist of 1   for walker control and safety and cues for upright posture, safety, and pacing slow ashley increased Right lower extremity external rotation 4 side steps and  1 steps using  wheeled walker with Moderate assist of 1   for walker approximation, balance, multiplane instability, and safety     75 feet using  wheeled walker with Supervision     Stair negotiation: ascended and descended   Not assessed  Not assessed        ROM Within functional limits    Increase range of motion 10% of affected joints    Strength BUE:  refer to OT eval  RLE:  3+/5  LLE:  3+/5  Increase strength in affected mm groups by 1/3 grade   Balance Sitting EOB:  good -  Dynamic Standing:  fair wheeled walker  Sitting EOB: fair +  Dynamic Standing: fair    Sitting EOB:  good    Dynamic Standing: fair +wheeled walker      Patient is Alert & Oriented x person, place, time, and situation and follows directions    Sensation:  Patient  denies numbness/tingling   Edema:  yes bilateral upper extremities   Endurance: fair       Vitals:  1 liters nasal cannula   Blood Pressure at rest  Blood Pressure during session 133/63   Heart Rate at rest  Heart Rate during session    SPO2 at rest%  SPO2 during session 97%     Patient education  Patient educated on role of Physical Therapy, risks of immobility, safety and plan of care,  importance of mobility while in hospital , purse lip breathing, and ankle pumps, quad set and glut set for edema control, blood clot prevention     Patient response to education:   Pt verbalized understanding Pt demonstrated skill Pt requires further education in this area   Yes Partial Yes      Treatment:  Patient practiced and was instructed/facilitated in the following treatment: Patient assisted to edge of bed,   Sat edge of bed 5 minutes with Minimal assist of 1 to increase dynamic sitting balance and activity tolerance. Doffed/donned gown due to saturation. Stood took steps to chair. Performed seated exercise. Stood took one step and assisted back to chair. Therapeutic Exercises:  ankle pumps, glut sets, hip abduction/adduction, long arc quad, and seated marching x 10-12 reps. Patient states that her BUE and BLE feel heavy; patient was presenting with good range of motion. At end of session, patient in chair with alarm call light and phone within reach,  all lines and tubes intact, nursing notified. Patient would benefit from continued skilled Physical Therapy to improve functional independence and quality of life.          Patient's/ family goals   rehab    Time in 46  Time out 1106  Total Treatment Time  23 minutes      CPT codes:  Therapeutic activities (94474)   14 minutes  1 unit(s)  Therapeutic exercises (18573)   9 minutes  1 unit(s)    Artist Starla, PTA #[id number]

## 2023-02-23 NOTE — CARE COORDINATION
Ss note: 2/23/2023 10:02 AM Neg Covid on 2-21-23. Discharge order noted. PRECERT for SOPRIYANKA Mitchell expires today. Pt is on 1 liter of oxygen. Arranged Physician Ambulance transfer to SNF today at 3:00 pm for skilled rehab. Facility liaison, nursing and pts dtr Marizol Elise aware of discharge destination and time. Hens completed.  STEPHAN Cardoso

## 2023-02-24 NOTE — DISCHARGE SUMMARY
[address]                               DISCHARGE SUMMARY    PATIENT NAME: Price Argueta                      :        1929  MED REC NO:   [id number]                            ROOM:       0424  ACCOUNT NO:   [de-identified]                           ADMIT DATE: 2023  PROVIDER:     Jaycee Alfonso DO                  DISCHARGE DATE:  2023    ADMITTING DIAGNOSIS:  Abdominal pain. FINAL DISCHARGE DIAGNOSES:  Small-bowel obstruction secondary to  incarcerated right inguinal hernia. SECONDARY DISCHARGE DIAGNOSES:  Aspiration pneumonia, acute respiratory  failure with associated hypoxemia, coronary artery disease with status  post pacemaker insertion, elevated troponin level suggesting demand  ischemia, acute on chronic kidney disease stage IV, essential  hypertension, hyperlipidemia, and paroxysmal atrial fibrillation. COMPLICATIONS:  There were no complications. OPERATION PERFORMED:  Right inguinal hernia, status post laparoscopic  repair on 2023. CONSULTATION OBTAINED:  With Dr. Sophie Hernandez, Dr. Brea Fuller, Dr. Dayana Garcia. No  OMT was given. ADMITTING PHYSICIAN:  Dr. Isidoro Infante and Dr. Pratik Tam. CHIEF COMPLAINT AND HISTORY OF CHIEF COMPLAINT:  This is a 77-year-old  white female who was admitted to [address] [place] [address]. The patient  presented to hospital on 2023. The patient presented to the  hospital here with what appeared to be abdominal pain. The patient  presented under the service of Dr. Isidoro Infante. The patient was admitted to  the hospital with a chief complaint of nausea and vomiting. The patient  states that several days ago, the patient had the symptomatology. This  got progressively worse. The patient admitted to the hospital with the  above clinical finding. The patient presented to the hospital at this  time.     PAST MEDICAL HISTORY:  Positive for the usual childhood diseases, a  history of gastroesophageal reflux disease, hypertension,  hyperlipidemia, and thyroid disorder. PHYSICAL EXAMINATION:  VITAL SIGNS:  Show height to be 5 feet 5 inches, weight is 115 pounds,  BMI 19.14. Blood pressure 134/47, pulse 80, respirations 17. GENERAL APPEARANCE:  This is a pleasant 26-year-old white female who is  resting comfortably. Nasogastric tube present. LUNGS:  Diminished. HEART:  Fairly regular. Pacemaker present on the right infraclavicular  area. Grade 1/6 systolic murmur. ABDOMEN:  Distended. Bowel sounds are diminished. EXTREMITIES:  Without any cyanosis, clubbing, or edema. While the patient was in the hospital, had following laboratory studies  performed:  Blood culture at the time of discharge showed no growth. Glucose was 158. BUN and creatinine were 15 and 0.6 respectively. Electrolytes were satisfactory. CBC and platelet levels were stable in  the hospital stay. The patient was admitted to the hospital here. The  patient was taken to Surgery at which time she had a laparoscopic repair  of the inguinal hernia which was performed by Dr. Ramona Rodriguez. The patient  tolerated the procedure well. Postoperatively, the patient was kept in  the intensive care unit. Postoperative consultation was obtained with  Dr. Jonh Benitez. The patient was kept in the intensive care unit overnight  until 01/19/2023. While the patient did relatively well, NG tube was  removed. The patient's diet was progressed as tolerated. The patient  was transported to the general telemetry floor. Over the next several  days, the patient clinically improved. The patient was going to be  discharged on 02/22/2023 but did seem somewhat fatigued. CAT scan was  obtained which showed no acute definite finding. At this time, for the  most part, optimal care was given. The following morning of 02/23/2023,  she improved dramatically and felt stable enough to be discharged to the  nursing home. At the time of discharge to the nursing home, her blood  sugar is 158. Blood culture showed no growth. BUN and creatinine were  15 and 0.6 respectively. Sodium 135, potassium 3.9, chloride 99, CO2 of  27. CMP was normal and CBC otherwise was stable. The patient at the time of discharge revealed the following. Her blood  pressure was 135/57, temperature 99, pulse 79, respirations 16, O2 sat  95%, height 5 feet 5 inches, and weight 112. The patient was discharged  on Augmentin 875/125 q.12 h. for 5 more days, Colace one q.d., Protonix  40 q.d., senna one daily. The patient will continue metoprolol,  Lopressor 12.5 mg b.i.d., Eliquis 2.5 b.i.d., lisinopril 10 q.d.,  melatonin 1 mg at bedtime, Lialda 1.2 gm daily, prednisone 5 mg q.d.,  and Zocor 40 mg q.d. at bedtime. The patient otherwise was clinically improved at the time of discharge. More than 40 minutes were spent on the day of discharge with more than  50% of the time spent face to face with the patient discussing plan of  care/therapy/treatment. She will continue physical therapy at the  nursing home with progressive improvement noted.         Lashell Tenorio DO    D: 02/23/2023 16:15:25       T: 02/24/2023 13:47:26     [email or web address]  Job#: [id number]     Doc#: [id number]    CC:

## 2023-02-27 LAB
EKG ATRIAL RATE: 468 BPM
EKG Q-T INTERVAL: 460 MS
EKG QRS DURATION: 156 MS
EKG QTC CALCULATION (BAZETT): 496 MS
EKG R AXIS: -72 DEGREES
EKG T AXIS: 84 DEGREES
EKG VENTRICULAR RATE: 70 BPM

## 2023-03-06 ENCOUNTER — OFFICE VISIT (OUTPATIENT)
Dept: SURGERY | Age: 88
End: 2023-03-06

## 2023-03-06 VITALS
TEMPERATURE: 98.1 F | SYSTOLIC BLOOD PRESSURE: 133 MMHG | HEIGHT: 65 IN | WEIGHT: 112 LBS | BODY MASS INDEX: 18.66 KG/M2 | HEART RATE: 70 BPM | DIASTOLIC BLOOD PRESSURE: 71 MMHG

## 2023-03-06 DIAGNOSIS — K40.30 INCARCERATED INGUINAL HERNIA, UNILATERAL: ICD-10-CM

## 2023-03-06 DIAGNOSIS — R60.0 LEG EDEMA, RIGHT: ICD-10-CM

## 2023-03-06 DIAGNOSIS — K56.609 SBO (SMALL BOWEL OBSTRUCTION) (HCC): Primary | ICD-10-CM

## 2023-03-06 PROCEDURE — 99024 POSTOP FOLLOW-UP VISIT: CPT | Performed by: SURGERY
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
## 2023-04-06 LAB
ALBUMIN SERPL-MCNC: 3.1 G/DL (ref 3.5–5.2)
ALP SERPL-CCNC: 89 U/L (ref 35–104)
ALT SERPL-CCNC: 14 U/L (ref 0–32)
ANION GAP SERPL CALCULATED.3IONS-SCNC: 12 MMOL/L (ref 7–16)
AST SERPL-CCNC: 20 U/L (ref 0–31)
BASOPHILS # BLD: 0.04 E9/L (ref 0–0.2)
BASOPHILS NFR BLD: 0.5 % (ref 0–2)
BILIRUB SERPL-MCNC: 0.5 MG/DL (ref 0–1.2)
BUN SERPL-MCNC: 21 MG/DL (ref 6–23)
CALCIUM SERPL-MCNC: 8.7 MG/DL (ref 8.6–10.2)
CHLORIDE SERPL-SCNC: 90 MMOL/L (ref 98–107)
CO2 SERPL-SCNC: 34 MMOL/L (ref 22–29)
CREAT SERPL-MCNC: 0.8 MG/DL (ref 0.5–1)
EOSINOPHIL # BLD: 0.01 E9/L (ref 0.05–0.5)
EOSINOPHIL NFR BLD: 0.1 % (ref 0–6)
ERYTHROCYTE [DISTWIDTH] IN BLOOD BY AUTOMATED COUNT: 15.8 FL (ref 11.5–15)
GLUCOSE SERPL-MCNC: 295 MG/DL (ref 74–99)
HCT VFR BLD AUTO: 37.7 % (ref 34–48)
HGB BLD-MCNC: 11.6 G/DL (ref 11.5–15.5)
IMM GRANULOCYTES # BLD: 0.13 E9/L
IMM GRANULOCYTES NFR BLD: 1.5 % (ref 0–5)
LYMPHOCYTES # BLD: 0.95 E9/L (ref 1.5–4)
LYMPHOCYTES NFR BLD: 10.7 % (ref 20–42)
MCH RBC QN AUTO: 33.8 PG (ref 26–35)
MCHC RBC AUTO-ENTMCNC: 30.8 % (ref 32–34.5)
MCV RBC AUTO: 109.9 FL (ref 80–99.9)
MONOCYTES # BLD: 0.77 E9/L (ref 0.1–0.95)
MONOCYTES NFR BLD: 8.7 % (ref 2–12)
NEUTROPHILS # BLD: 6.95 E9/L (ref 1.8–7.3)
NEUTS SEG NFR BLD: 78.5 % (ref 43–80)
PLATELET # BLD AUTO: 148 E9/L (ref 130–450)
PMV BLD AUTO: 13.7 FL (ref 7–12)
POTASSIUM SERPL-SCNC: 3.5 MMOL/L (ref 3.5–5)
PROT SERPL-MCNC: 5.9 G/DL (ref 6.4–8.3)
RBC # BLD AUTO: 3.43 E12/L (ref 3.5–5.5)
SODIUM SERPL-SCNC: 136 MMOL/L (ref 132–146)
WBC # BLD: 8.9 E9/L (ref 4.5–11.5)

## 2023-06-22 ENCOUNTER — APPOINTMENT (OUTPATIENT)
Dept: GENERAL RADIOLOGY | Age: 88
End: 2023-06-22
Payer: MEDICARE

## 2023-06-22 ENCOUNTER — HOSPITAL ENCOUNTER (EMERGENCY)
Age: 88
Discharge: HOME | End: 2023-06-22
Attending: EMERGENCY MEDICINE
Payer: MEDICARE

## 2023-06-22 ENCOUNTER — APPOINTMENT (OUTPATIENT)
Dept: CT IMAGING | Age: 88
End: 2023-06-22
Payer: MEDICARE

## 2023-06-22 VITALS
SYSTOLIC BLOOD PRESSURE: 190 MMHG | HEART RATE: 70 BPM | OXYGEN SATURATION: 92 % | RESPIRATION RATE: 18 BRPM | BODY MASS INDEX: 18.64 KG/M2 | TEMPERATURE: 97.8 F | DIASTOLIC BLOOD PRESSURE: 85 MMHG | WEIGHT: 112 LBS

## 2023-06-22 DIAGNOSIS — T14.8XXA MULTIPLE SKIN TEARS: Primary | ICD-10-CM

## 2023-06-22 PROCEDURE — 73070 X-RAY EXAM OF ELBOW: CPT

## 2023-06-22 PROCEDURE — 70450 CT HEAD/BRAIN W/O DYE: CPT

## 2023-06-22 PROCEDURE — 90471 IMMUNIZATION ADMIN: CPT | Performed by: EMERGENCY MEDICINE

## 2023-06-22 PROCEDURE — 6360000002 HC RX W HCPCS: Performed by: EMERGENCY MEDICINE

## 2023-06-22 PROCEDURE — 90715 TDAP VACCINE 7 YRS/> IM: CPT | Performed by: EMERGENCY MEDICINE

## 2023-06-22 PROCEDURE — 99284 EMERGENCY DEPT VISIT MOD MDM: CPT

## 2023-06-22 PROCEDURE — 72125 CT NECK SPINE W/O DYE: CPT

## 2023-06-22 RX ORDER — CEPHALEXIN 500 MG/1
500 CAPSULE ORAL 4 TIMES DAILY
Qty: 40 CAPSULE | Refills: 0 | Status: ON HOLD | OUTPATIENT
Start: 2023-06-22 | End: 2023-07-02

## 2023-06-22 RX ADMIN — TETANUS TOXOID, REDUCED DIPHTHERIA TOXOID AND ACELLULAR PERTUSSIS VACCINE, ADSORBED 0.5 ML: 5; 2.5; 8; 8; 2.5 SUSPENSION INTRAMUSCULAR at 14:00

## 2023-06-22 NOTE — ED PROVIDER NOTES
in these areas. Differential diagnoses included but not limited to elbow fracture/contusion, skin tear, intracranial hemorrhage, cervical spine fracture/subluxation. Workup in the ED appropriate imaging. The following imaging were evaluated by myself. Bilateral elbow plain films show no evidence of bony fracture or dislocation. Radiologist confirmed that there is no acute bony injury. CT of the head and cervical spine interpreted by the radiologist as showing no acute intracranial abnormality or acute injury to the cervical spine. Patient's skin tears were cleaned with saline and Steri-Stripped. Advised daughter to clean them daily and change dressings daily and watch for any signs of infection. Patient placed on Keflex prophylactically. Patient continues to be non-toxic on re-evaluation. Findings were discussed with the patient and reasons to immediately return to the ED were articulated to them. They will follow-up with their PMD.      CONSULTS: (Who and What was discussed)  None        I am the Primary Clinician of Record. FINAL IMPRESSION      1. Multiple skin tears          DISPOSITION/PLAN     DISPOSITION Decision To Discharge 06/22/2023 04:22:34 PM      PATIENT REFERRED TO:  Yareli Ruvalcaba MD  [address] [place] [address] 06     Call in 3 days  For wound re-check      DISCHARGE MEDICATIONS:  New Prescriptions    CEPHALEXIN (KEFLEX) 500 MG CAPSULE    Take 1 capsule by mouth 4 times daily for 10 days       DISCONTINUED MEDICATIONS:  Discontinued Medications    No medications on file              (Please note that portions of this note were completed with a voice recognition program.  Efforts were made to edit the dictations but occasionally words are mis-transcribed. )    Maisha Jarrett DO (electronically signed)               Maisha Jarrett DO  06/22/23 [address] [facility],   06/22/23 7622

## 2023-06-27 ENCOUNTER — HOSPITAL ENCOUNTER (INPATIENT)
Age: 88
LOS: 6 days | Discharge: NURSING, REHAB OR LONG-TERM CARE | DRG: 178 | End: 2023-07-03
Attending: STUDENT IN AN ORGANIZED HEALTH CARE EDUCATION/TRAINING PROGRAM | Admitting: INTERNAL MEDICINE
Payer: MEDICARE
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
## 2023-06-27 DIAGNOSIS — T14.8XXA OPEN WOUND: Primary | ICD-10-CM

## 2023-06-27 PROBLEM — R62.7 FAILURE TO THRIVE IN ADULT: Status: ACTIVE | Noted: 2023-06-27

## 2023-06-27 LAB
ALBUMIN SERPL-MCNC: 3 G/DL (ref 3.5–5.2)
ALP SERPL-CCNC: 90 U/L (ref 35–104)
ALT SERPL-CCNC: 15 U/L (ref 0–32)
ANION GAP SERPL CALCULATED.3IONS-SCNC: 13 MMOL/L (ref 7–16)
AST SERPL-CCNC: 26 U/L (ref 0–31)
BASOPHILS # BLD: 0.02 E9/L (ref 0–0.2)
BASOPHILS NFR BLD: 0.2 % (ref 0–2)
BILIRUB SERPL-MCNC: 0.3 MG/DL (ref 0–1.2)
BUN SERPL-MCNC: 20 MG/DL (ref 6–23)
CALCIUM SERPL-MCNC: 8.7 MG/DL (ref 8.6–10.2)
CHLORIDE SERPL-SCNC: 92 MMOL/L (ref 98–107)
CO2 SERPL-SCNC: 28 MMOL/L (ref 22–29)
CREAT SERPL-MCNC: 0.8 MG/DL (ref 0.5–1)
EOSINOPHIL # BLD: 0 E9/L (ref 0.05–0.5)
EOSINOPHIL NFR BLD: 0 % (ref 0–6)
ERYTHROCYTE [DISTWIDTH] IN BLOOD BY AUTOMATED COUNT: 14 FL (ref 11.5–15)
GLUCOSE SERPL-MCNC: 414 MG/DL (ref 74–99)
HCT VFR BLD AUTO: 40.4 % (ref 34–48)
HGB BLD-MCNC: 13.2 G/DL (ref 11.5–15.5)
IMM GRANULOCYTES # BLD: 0.17 E9/L
IMM GRANULOCYTES NFR BLD: 2 % (ref 0–5)
INR BLD: 0.9
LACTATE BLDV-SCNC: 2.1 MMOL/L (ref 0.5–2.2)
LACTATE BLDV-SCNC: 2.7 MMOL/L (ref 0.5–2.2)
LYMPHOCYTES # BLD: 1.01 E9/L (ref 1.5–4)
LYMPHOCYTES NFR BLD: 12 % (ref 20–42)
MCH RBC QN AUTO: 34.1 PG (ref 26–35)
MCHC RBC AUTO-ENTMCNC: 32.7 % (ref 32–34.5)
MCV RBC AUTO: 104.4 FL (ref 80–99.9)
MONOCYTES # BLD: 0.63 E9/L (ref 0.1–0.95)
MONOCYTES NFR BLD: 7.5 % (ref 2–12)
NEUTROPHILS # BLD: 6.58 E9/L (ref 1.8–7.3)
NEUTS SEG NFR BLD: 78.3 % (ref 43–80)
PLATELET # BLD AUTO: 184 E9/L (ref 130–450)
PMV BLD AUTO: 12.8 FL (ref 7–12)
POTASSIUM SERPL-SCNC: 3.9 MMOL/L (ref 3.5–5)
PROT SERPL-MCNC: 6.1 G/DL (ref 6.4–8.3)
PROTHROMBIN TIME: 10.7 SEC (ref 9.3–12.4)
RBC # BLD AUTO: 3.87 E12/L (ref 3.5–5.5)
REASON FOR REJECTION: NORMAL
REJECTED TEST: NORMAL
SODIUM SERPL-SCNC: 133 MMOL/L (ref 132–146)
WBC # BLD: 8.4 E9/L (ref 4.5–11.5)

## 2023-06-27 PROCEDURE — 83605 ASSAY OF LACTIC ACID: CPT

## 2023-06-27 PROCEDURE — 2580000003 HC RX 258: Performed by: INTERNAL MEDICINE

## 2023-06-27 PROCEDURE — 85610 PROTHROMBIN TIME: CPT

## 2023-06-27 PROCEDURE — 6360000002 HC RX W HCPCS: Performed by: INTERNAL MEDICINE

## 2023-06-27 PROCEDURE — 6370000000 HC RX 637 (ALT 250 FOR IP): Performed by: INTERNAL MEDICINE

## 2023-06-27 PROCEDURE — 36415 COLL VENOUS BLD VENIPUNCTURE: CPT

## 2023-06-27 PROCEDURE — 85025 COMPLETE CBC W/AUTO DIFF WBC: CPT

## 2023-06-27 PROCEDURE — 99285 EMERGENCY DEPT VISIT HI MDM: CPT

## 2023-06-27 PROCEDURE — 6370000000 HC RX 637 (ALT 250 FOR IP): Performed by: STUDENT IN AN ORGANIZED HEALTH CARE EDUCATION/TRAINING PROGRAM

## 2023-06-27 PROCEDURE — 1200000000 HC SEMI PRIVATE

## 2023-06-27 PROCEDURE — 2580000003 HC RX 258

## 2023-06-27 PROCEDURE — 80053 COMPREHEN METABOLIC PANEL: CPT

## 2023-06-27 RX ORDER — DEXTROSE MONOHYDRATE 100 MG/ML
INJECTION, SOLUTION INTRAVENOUS CONTINUOUS PRN
Status: DISCONTINUED | OUTPATIENT
Start: 2023-06-27 | End: 2023-06-28

## 2023-06-27 RX ORDER — CEPHALEXIN 500 MG/1
500 CAPSULE ORAL 4 TIMES DAILY
Status: DISCONTINUED | OUTPATIENT
Start: 2023-06-27 | End: 2023-06-29

## 2023-06-27 RX ORDER — SODIUM CHLORIDE 9 MG/ML
INJECTION, SOLUTION INTRAVENOUS
Status: COMPLETED
Start: 2023-06-27 | End: 2023-06-27

## 2023-06-27 RX ORDER — POLYETHYLENE GLYCOL 3350 17 G/17G
17 POWDER, FOR SOLUTION ORAL DAILY PRN
Status: DISCONTINUED | OUTPATIENT
Start: 2023-06-27 | End: 2023-07-03 | Stop reason: HOSPADM

## 2023-06-27 RX ORDER — PANTOPRAZOLE SODIUM 40 MG/1
40 TABLET, DELAYED RELEASE ORAL
Status: DISCONTINUED | OUTPATIENT
Start: 2023-06-28 | End: 2023-07-03 | Stop reason: HOSPADM

## 2023-06-27 RX ORDER — 0.9 % SODIUM CHLORIDE 0.9 %
500 INTRAVENOUS SOLUTION INTRAVENOUS ONCE
Status: COMPLETED | OUTPATIENT
Start: 2023-06-27 | End: 2023-06-27

## 2023-06-27 RX ORDER — MESALAMINE 400 MG/1
400 CAPSULE, DELAYED RELEASE ORAL 3 TIMES DAILY
Status: DISCONTINUED | OUTPATIENT
Start: 2023-06-27 | End: 2023-07-03 | Stop reason: HOSPADM

## 2023-06-27 RX ORDER — ACETAMINOPHEN 325 MG/1
650 TABLET ORAL EVERY 6 HOURS PRN
Status: DISCONTINUED | OUTPATIENT
Start: 2023-06-27 | End: 2023-07-03 | Stop reason: HOSPADM

## 2023-06-27 RX ORDER — ONDANSETRON 4 MG/1
4 TABLET, ORALLY DISINTEGRATING ORAL EVERY 8 HOURS PRN
Status: DISCONTINUED | OUTPATIENT
Start: 2023-06-27 | End: 2023-07-03 | Stop reason: HOSPADM

## 2023-06-27 RX ORDER — ENOXAPARIN SODIUM 100 MG/ML
30 INJECTION SUBCUTANEOUS DAILY
Status: DISCONTINUED | OUTPATIENT
Start: 2023-06-27 | End: 2023-06-27

## 2023-06-27 RX ORDER — SODIUM CHLORIDE 0.9 % (FLUSH) 0.9 %
5-40 SYRINGE (ML) INJECTION PRN
Status: DISCONTINUED | OUTPATIENT
Start: 2023-06-27 | End: 2023-07-03 | Stop reason: HOSPADM

## 2023-06-27 RX ORDER — SODIUM CHLORIDE 0.9 % (FLUSH) 0.9 %
5-40 SYRINGE (ML) INJECTION EVERY 12 HOURS SCHEDULED
Status: DISCONTINUED | OUTPATIENT
Start: 2023-06-27 | End: 2023-07-03 | Stop reason: HOSPADM

## 2023-06-27 RX ORDER — ACETAMINOPHEN 650 MG/1
650 SUPPOSITORY RECTAL EVERY 6 HOURS PRN
Status: DISCONTINUED | OUTPATIENT
Start: 2023-06-27 | End: 2023-07-03 | Stop reason: HOSPADM

## 2023-06-27 RX ORDER — ONDANSETRON 2 MG/ML
4 INJECTION INTRAMUSCULAR; INTRAVENOUS EVERY 6 HOURS PRN
Status: DISCONTINUED | OUTPATIENT
Start: 2023-06-27 | End: 2023-07-03 | Stop reason: HOSPADM

## 2023-06-27 RX ORDER — FENTANYL CITRATE 0.05 MG/ML
25 INJECTION, SOLUTION INTRAMUSCULAR; INTRAVENOUS EVERY 6 HOURS PRN
Status: DISCONTINUED | OUTPATIENT
Start: 2023-06-27 | End: 2023-06-28

## 2023-06-27 RX ORDER — FENTANYL CITRATE 0.05 MG/ML
25 INJECTION, SOLUTION INTRAMUSCULAR; INTRAVENOUS ONCE
Status: COMPLETED | OUTPATIENT
Start: 2023-06-27 | End: 2023-06-27

## 2023-06-27 RX ORDER — OXYCODONE HYDROCHLORIDE AND ACETAMINOPHEN 5; 325 MG/1; MG/1
1 TABLET ORAL ONCE
Status: COMPLETED | OUTPATIENT
Start: 2023-06-27 | End: 2023-06-27

## 2023-06-27 RX ORDER — SODIUM CHLORIDE 9 MG/ML
INJECTION, SOLUTION INTRAVENOUS PRN
Status: DISCONTINUED | OUTPATIENT
Start: 2023-06-27 | End: 2023-07-03 | Stop reason: HOSPADM

## 2023-06-27 RX ORDER — PREDNISONE 10 MG/1
10 TABLET ORAL DAILY
Status: DISCONTINUED | OUTPATIENT
Start: 2023-06-28 | End: 2023-07-03 | Stop reason: HOSPADM

## 2023-06-27 RX ADMIN — FENTANYL CITRATE 25 MCG: 0.05 INJECTION, SOLUTION INTRAMUSCULAR; INTRAVENOUS at 22:27

## 2023-06-27 RX ADMIN — OXYCODONE AND ACETAMINOPHEN 1 TABLET: 5; 325 TABLET ORAL at 17:26

## 2023-06-27 RX ADMIN — SODIUM CHLORIDE, PRESERVATIVE FREE 10 ML: 5 INJECTION INTRAVENOUS at 22:34

## 2023-06-27 RX ADMIN — METOPROLOL TARTRATE 12.5 MG: 25 TABLET, FILM COATED ORAL at 22:34

## 2023-06-27 RX ADMIN — MESALAMINE 400 MG: 400 CAPSULE, DELAYED RELEASE ORAL at 22:39

## 2023-06-27 RX ADMIN — FENTANYL CITRATE 25 MCG: 0.05 INJECTION, SOLUTION INTRAMUSCULAR; INTRAVENOUS at 23:25

## 2023-06-27 RX ADMIN — APIXABAN 2.5 MG: 2.5 TABLET, FILM COATED ORAL at 22:34

## 2023-06-27 RX ADMIN — CEPHALEXIN 500 MG: 500 CAPSULE ORAL at 22:33

## 2023-06-27 RX ADMIN — SODIUM CHLORIDE 500 ML: 9 INJECTION, SOLUTION INTRAVENOUS at 19:58

## 2023-06-27 RX ADMIN — Medication 500 ML: at 19:58

## 2023-06-27 ASSESSMENT — ENCOUNTER SYMPTOMS
ABDOMINAL PAIN: 0
CHEST TIGHTNESS: 0
SHORTNESS OF BREATH: 0
PHOTOPHOBIA: 0
VOMITING: 0
NAUSEA: 0
COUGH: 0
DIARRHEA: 0
ABDOMINAL DISTENTION: 0

## 2023-06-27 ASSESSMENT — PAIN DESCRIPTION - LOCATION
LOCATION: ARM;ELBOW
LOCATION: ARM;ELBOW

## 2023-06-27 ASSESSMENT — PAIN DESCRIPTION - ORIENTATION
ORIENTATION: RIGHT;LEFT
ORIENTATION: RIGHT;LEFT

## 2023-06-27 ASSESSMENT — PAIN DESCRIPTION - DESCRIPTORS
DESCRIPTORS: TENDER
DESCRIPTORS: TENDER;SORE;DISCOMFORT

## 2023-06-27 ASSESSMENT — PAIN - FUNCTIONAL ASSESSMENT
PAIN_FUNCTIONAL_ASSESSMENT: PREVENTS OR INTERFERES SOME ACTIVE ACTIVITIES AND ADLS
PAIN_FUNCTIONAL_ASSESSMENT: PREVENTS OR INTERFERES SOME ACTIVE ACTIVITIES AND ADLS
PAIN_FUNCTIONAL_ASSESSMENT: NONE - DENIES PAIN

## 2023-06-27 ASSESSMENT — PAIN SCALES - GENERAL
PAINLEVEL_OUTOF10: 10
PAINLEVEL_OUTOF10: 9
PAINLEVEL_OUTOF10: 10

## 2023-06-27 ASSESSMENT — LIFESTYLE VARIABLES: HOW OFTEN DO YOU HAVE A DRINK CONTAINING ALCOHOL: NEVER

## 2023-06-28 PROBLEM — E44.0 MODERATE PROTEIN-CALORIE MALNUTRITION (HCC): Chronic | Status: ACTIVE | Noted: 2023-06-28

## 2023-06-28 LAB
ALBUMIN SERPL-MCNC: 2.8 G/DL (ref 3.5–5.2)
ALP SERPL-CCNC: 77 U/L (ref 35–104)
ALT SERPL-CCNC: 12 U/L (ref 0–32)
ANION GAP SERPL CALCULATED.3IONS-SCNC: 9 MMOL/L (ref 7–16)
ANTISTREPTOLYSIN-O: 21 IU/ML (ref 0–200)
AST SERPL-CCNC: 15 U/L (ref 0–31)
B PARAP IS1001 DNA NPH QL NAA+NON-PROBE: NOT DETECTED
B PERT.PT PRMT NPH QL NAA+NON-PROBE: NOT DETECTED
BASOPHILS # BLD: 0.03 E9/L (ref 0–0.2)
BASOPHILS NFR BLD: 0.5 % (ref 0–2)
BILIRUB SERPL-MCNC: 0.3 MG/DL (ref 0–1.2)
BUN SERPL-MCNC: 13 MG/DL (ref 6–23)
C PNEUM DNA NPH QL NAA+NON-PROBE: NOT DETECTED
CALCIUM SERPL-MCNC: 8.3 MG/DL (ref 8.6–10.2)
CHLORIDE SERPL-SCNC: 95 MMOL/L (ref 98–107)
CO2 SERPL-SCNC: 30 MMOL/L (ref 22–29)
CREAT SERPL-MCNC: 0.6 MG/DL (ref 0.5–1)
CRP SERPL HS-MCNC: 1.6 MG/DL (ref 0–0.4)
EOSINOPHIL # BLD: 0 E9/L (ref 0.05–0.5)
EOSINOPHIL NFR BLD: 0 % (ref 0–6)
ERYTHROCYTE [DISTWIDTH] IN BLOOD BY AUTOMATED COUNT: 13.7 FL (ref 11.5–15)
ERYTHROCYTE [SEDIMENTATION RATE] IN BLOOD BY WESTERGREN METHOD: 41 MM/HR (ref 0–20)
FLUAV RNA NPH QL NAA+NON-PROBE: NOT DETECTED
FLUBV RNA NPH QL NAA+NON-PROBE: NOT DETECTED
GLUCOSE SERPL-MCNC: 200 MG/DL (ref 74–99)
HADV DNA NPH QL NAA+NON-PROBE: NOT DETECTED
HCOV 229E RNA NPH QL NAA+NON-PROBE: NOT DETECTED
HCOV HKU1 RNA NPH QL NAA+NON-PROBE: NOT DETECTED
HCOV NL63 RNA NPH QL NAA+NON-PROBE: NOT DETECTED
HCOV OC43 RNA NPH QL NAA+NON-PROBE: NOT DETECTED
HCT VFR BLD AUTO: 35.1 % (ref 34–48)
HGB BLD-MCNC: 11.4 G/DL (ref 11.5–15.5)
HMPV RNA NPH QL NAA+NON-PROBE: NOT DETECTED
HPIV1 RNA NPH QL NAA+NON-PROBE: NOT DETECTED
HPIV2 RNA NPH QL NAA+NON-PROBE: NOT DETECTED
HPIV3 RNA NPH QL NAA+NON-PROBE: NOT DETECTED
HPIV4 RNA NPH QL NAA+NON-PROBE: NOT DETECTED
IMM GRANULOCYTES # BLD: 0.09 E9/L
IMM GRANULOCYTES NFR BLD: 1.5 % (ref 0–5)
LYMPHOCYTES # BLD: 0.99 E9/L (ref 1.5–4)
LYMPHOCYTES NFR BLD: 16.7 % (ref 20–42)
M PNEUMO DNA NPH QL NAA+NON-PROBE: NOT DETECTED
MAGNESIUM SERPL-MCNC: 1.4 MG/DL (ref 1.6–2.6)
MCH RBC QN AUTO: 33.6 PG (ref 26–35)
MCHC RBC AUTO-ENTMCNC: 32.5 % (ref 32–34.5)
MCV RBC AUTO: 103.5 FL (ref 80–99.9)
METER GLUCOSE: 170 MG/DL (ref 74–99)
METER GLUCOSE: 271 MG/DL (ref 74–99)
METER GLUCOSE: 338 MG/DL (ref 74–99)
METER GLUCOSE: 354 MG/DL (ref 74–99)
METER GLUCOSE: 391 MG/DL (ref 74–99)
MONOCYTES # BLD: 0.61 E9/L (ref 0.1–0.95)
MONOCYTES NFR BLD: 10.3 % (ref 2–12)
NEUTROPHILS # BLD: 4.22 E9/L (ref 1.8–7.3)
NEUTS SEG NFR BLD: 71 % (ref 43–80)
PHOSPHATE SERPL-MCNC: 2.9 MG/DL (ref 2.5–4.5)
PLATELET # BLD AUTO: 147 E9/L (ref 130–450)
PMV BLD AUTO: 12.6 FL (ref 7–12)
POTASSIUM SERPL-SCNC: 2.8 MMOL/L (ref 3.5–5)
POTASSIUM SERPL-SCNC: 5.2 MMOL/L (ref 3.5–5)
PROCALCITONIN: 0.07 NG/ML (ref 0–0.08)
PROT SERPL-MCNC: 5.3 G/DL (ref 6.4–8.3)
RBC # BLD AUTO: 3.39 E12/L (ref 3.5–5.5)
RSV RNA NPH QL NAA+NON-PROBE: NOT DETECTED
RV+EV RNA NPH QL NAA+NON-PROBE: NOT DETECTED
SARS-COV-2 RNA NPH QL NAA+NON-PROBE: DETECTED
SODIUM SERPL-SCNC: 134 MMOL/L (ref 132–146)
T4 FREE SERPL-MCNC: 1.32 NG/DL (ref 0.93–1.7)
TSH SERPL-MCNC: 1.63 UIU/ML (ref 0.27–4.2)
WBC # BLD: 5.9 E9/L (ref 4.5–11.5)

## 2023-06-28 PROCEDURE — 1200000000 HC SEMI PRIVATE

## 2023-06-28 PROCEDURE — 86140 C-REACTIVE PROTEIN: CPT

## 2023-06-28 PROCEDURE — 84439 ASSAY OF FREE THYROXINE: CPT

## 2023-06-28 PROCEDURE — 6360000002 HC RX W HCPCS: Performed by: INTERNAL MEDICINE

## 2023-06-28 PROCEDURE — 97530 THERAPEUTIC ACTIVITIES: CPT | Performed by: PHYSICAL THERAPIST

## 2023-06-28 PROCEDURE — 6370000000 HC RX 637 (ALT 250 FOR IP): Performed by: INTERNAL MEDICINE

## 2023-06-28 PROCEDURE — 84100 ASSAY OF PHOSPHORUS: CPT

## 2023-06-28 PROCEDURE — 97161 PT EVAL LOW COMPLEX 20 MIN: CPT | Performed by: PHYSICAL THERAPIST

## 2023-06-28 PROCEDURE — 83036 HEMOGLOBIN GLYCOSYLATED A1C: CPT

## 2023-06-28 PROCEDURE — 85651 RBC SED RATE NONAUTOMATED: CPT

## 2023-06-28 PROCEDURE — 83735 ASSAY OF MAGNESIUM: CPT

## 2023-06-28 PROCEDURE — 82962 GLUCOSE BLOOD TEST: CPT

## 2023-06-28 PROCEDURE — 84443 ASSAY THYROID STIM HORMONE: CPT

## 2023-06-28 PROCEDURE — 85025 COMPLETE CBC W/AUTO DIFF WBC: CPT

## 2023-06-28 PROCEDURE — 80053 COMPREHEN METABOLIC PANEL: CPT

## 2023-06-28 PROCEDURE — 36415 COLL VENOUS BLD VENIPUNCTURE: CPT

## 2023-06-28 PROCEDURE — 86060 ANTISTREPTOLYSIN O TITER: CPT

## 2023-06-28 PROCEDURE — 2580000003 HC RX 258: Performed by: INTERNAL MEDICINE

## 2023-06-28 PROCEDURE — 0202U NFCT DS 22 TRGT SARS-COV-2: CPT

## 2023-06-28 PROCEDURE — 84132 ASSAY OF SERUM POTASSIUM: CPT

## 2023-06-28 PROCEDURE — 84145 PROCALCITONIN (PCT): CPT

## 2023-06-28 RX ORDER — MAGNESIUM SULFATE IN WATER 40 MG/ML
2000 INJECTION, SOLUTION INTRAVENOUS PRN
Status: DISCONTINUED | OUTPATIENT
Start: 2023-06-28 | End: 2023-07-03 | Stop reason: HOSPADM

## 2023-06-28 RX ORDER — POTASSIUM CHLORIDE 7.45 MG/ML
10 INJECTION INTRAVENOUS PRN
Status: DISCONTINUED | OUTPATIENT
Start: 2023-06-28 | End: 2023-07-03 | Stop reason: HOSPADM

## 2023-06-28 RX ORDER — INSULIN LISPRO 100 [IU]/ML
0-4 INJECTION, SOLUTION INTRAVENOUS; SUBCUTANEOUS
Status: DISCONTINUED | OUTPATIENT
Start: 2023-06-28 | End: 2023-06-29

## 2023-06-28 RX ORDER — SODIUM CHLORIDE AND POTASSIUM CHLORIDE 300; 900 MG/100ML; MG/100ML
INJECTION, SOLUTION INTRAVENOUS CONTINUOUS
Status: DISCONTINUED | OUTPATIENT
Start: 2023-06-28 | End: 2023-06-28

## 2023-06-28 RX ORDER — DEXTROSE MONOHYDRATE 100 MG/ML
INJECTION, SOLUTION INTRAVENOUS CONTINUOUS PRN
Status: DISCONTINUED | OUTPATIENT
Start: 2023-06-28 | End: 2023-07-03 | Stop reason: HOSPADM

## 2023-06-28 RX ORDER — TORSEMIDE 10 MG/1
10 TABLET ORAL DAILY
COMMUNITY

## 2023-06-28 RX ORDER — LISINOPRIL 10 MG/1
10 TABLET ORAL DAILY
Status: DISCONTINUED | OUTPATIENT
Start: 2023-06-28 | End: 2023-06-29

## 2023-06-28 RX ORDER — INSULIN LISPRO 100 [IU]/ML
0-4 INJECTION, SOLUTION INTRAVENOUS; SUBCUTANEOUS NIGHTLY
Status: DISCONTINUED | OUTPATIENT
Start: 2023-06-28 | End: 2023-06-29

## 2023-06-28 RX ORDER — POTASSIUM CHLORIDE 20 MEQ/1
40 TABLET, EXTENDED RELEASE ORAL PRN
Status: DISCONTINUED | OUTPATIENT
Start: 2023-06-28 | End: 2023-07-03 | Stop reason: HOSPADM

## 2023-06-28 RX ORDER — HYDROMORPHONE HYDROCHLORIDE 1 MG/ML
0.5 INJECTION, SOLUTION INTRAMUSCULAR; INTRAVENOUS; SUBCUTANEOUS EVERY 4 HOURS PRN
Status: DISCONTINUED | OUTPATIENT
Start: 2023-06-28 | End: 2023-07-02

## 2023-06-28 RX ADMIN — CEPHALEXIN 500 MG: 500 CAPSULE ORAL at 15:02

## 2023-06-28 RX ADMIN — MESALAMINE 400 MG: 400 CAPSULE, DELAYED RELEASE ORAL at 10:48

## 2023-06-28 RX ADMIN — MESALAMINE 400 MG: 400 CAPSULE, DELAYED RELEASE ORAL at 22:00

## 2023-06-28 RX ADMIN — POTASSIUM CHLORIDE AND SODIUM CHLORIDE: 900; 300 INJECTION, SOLUTION INTRAVENOUS at 10:44

## 2023-06-28 RX ADMIN — LISINOPRIL 10 MG: 10 TABLET ORAL at 10:48

## 2023-06-28 RX ADMIN — INSULIN LISPRO 2 UNITS: 100 INJECTION, SOLUTION INTRAVENOUS; SUBCUTANEOUS at 11:40

## 2023-06-28 RX ADMIN — POTASSIUM CHLORIDE 40 MEQ: 2 INJECTION, SOLUTION, CONCENTRATE INTRAVENOUS at 10:47

## 2023-06-28 RX ADMIN — APIXABAN 2.5 MG: 2.5 TABLET, FILM COATED ORAL at 10:48

## 2023-06-28 RX ADMIN — MAGNESIUM SULFATE HEPTAHYDRATE 2000 MG: 40 INJECTION, SOLUTION INTRAVENOUS at 10:45

## 2023-06-28 RX ADMIN — METOPROLOL TARTRATE 12.5 MG: 25 TABLET, FILM COATED ORAL at 22:00

## 2023-06-28 RX ADMIN — APIXABAN 2.5 MG: 2.5 TABLET, FILM COATED ORAL at 22:00

## 2023-06-28 RX ADMIN — MESALAMINE 400 MG: 400 CAPSULE, DELAYED RELEASE ORAL at 15:03

## 2023-06-28 RX ADMIN — INSULIN LISPRO 4 UNITS: 100 INJECTION, SOLUTION INTRAVENOUS; SUBCUTANEOUS at 22:01

## 2023-06-28 RX ADMIN — METOPROLOL TARTRATE 12.5 MG: 25 TABLET, FILM COATED ORAL at 10:48

## 2023-06-28 RX ADMIN — POTASSIUM CHLORIDE 20 MEQ: 2 INJECTION, SOLUTION, CONCENTRATE INTRAVENOUS at 15:03

## 2023-06-28 RX ADMIN — INSULIN LISPRO 4 UNITS: 100 INJECTION, SOLUTION INTRAVENOUS; SUBCUTANEOUS at 17:11

## 2023-06-28 RX ADMIN — CEPHALEXIN 500 MG: 500 CAPSULE ORAL at 22:00

## 2023-06-28 RX ADMIN — CEPHALEXIN 500 MG: 500 CAPSULE ORAL at 10:48

## 2023-06-28 RX ADMIN — SODIUM CHLORIDE, PRESERVATIVE FREE 10 ML: 5 INJECTION INTRAVENOUS at 10:49

## 2023-06-28 RX ADMIN — CEPHALEXIN 500 MG: 500 CAPSULE ORAL at 17:12

## 2023-06-28 RX ADMIN — PREDNISONE 10 MG: 10 TABLET ORAL at 10:48

## 2023-06-28 RX ADMIN — PANTOPRAZOLE SODIUM 40 MG: 40 TABLET, DELAYED RELEASE ORAL at 06:21

## 2023-06-28 ASSESSMENT — PAIN SCALES - WONG BAKER: WONGBAKER_NUMERICALRESPONSE: 6

## 2023-06-29 PROBLEM — U07.1 COVID-19: Status: ACTIVE | Noted: 2023-06-29

## 2023-06-29 LAB
ALBUMIN SERPL-MCNC: 3.1 G/DL (ref 3.5–5.2)
ALP SERPL-CCNC: 95 U/L (ref 35–104)
ALT SERPL-CCNC: 21 U/L (ref 0–32)
ANION GAP SERPL CALCULATED.3IONS-SCNC: 8 MMOL/L (ref 7–16)
AST SERPL-CCNC: 27 U/L (ref 0–31)
BACTERIA URNS QL MICRO: ABNORMAL /HPF
BASOPHILS # BLD: 0.04 E9/L (ref 0–0.2)
BASOPHILS NFR BLD: 0.4 % (ref 0–2)
BILIRUB SERPL-MCNC: 0.4 MG/DL (ref 0–1.2)
BILIRUB UR QL STRIP: NEGATIVE
BUN SERPL-MCNC: 19 MG/DL (ref 6–23)
CALCIUM SERPL-MCNC: 9 MG/DL (ref 8.6–10.2)
CHLORIDE SERPL-SCNC: 100 MMOL/L (ref 98–107)
CLARITY UR: ABNORMAL
CO2 SERPL-SCNC: 27 MMOL/L (ref 22–29)
COLOR UR: ABNORMAL
CORTIS SERPL-MCNC: 10.52 MCG/DL (ref 2.68–18.4)
CREAT SERPL-MCNC: 0.7 MG/DL (ref 0.5–1)
EOSINOPHIL # BLD: 0 E9/L (ref 0.05–0.5)
EOSINOPHIL NFR BLD: 0 % (ref 0–6)
ERYTHROCYTE [DISTWIDTH] IN BLOOD BY AUTOMATED COUNT: 14.1 FL (ref 11.5–15)
GLUCOSE SERPL-MCNC: 176 MG/DL (ref 74–99)
GLUCOSE UR STRIP-MCNC: 250 MG/DL
HCT VFR BLD AUTO: 40.5 % (ref 34–48)
HGB BLD-MCNC: 13.3 G/DL (ref 11.5–15.5)
HGB UR QL STRIP: ABNORMAL
IMM GRANULOCYTES # BLD: 0.07 E9/L
IMM GRANULOCYTES NFR BLD: 0.8 % (ref 0–5)
KETONES UR STRIP-MCNC: NEGATIVE MG/DL
LEUKOCYTE ESTERASE UR QL STRIP: ABNORMAL
LYMPHOCYTES # BLD: 1.5 E9/L (ref 1.5–4)
LYMPHOCYTES NFR BLD: 16.3 % (ref 20–42)
MAGNESIUM SERPL-MCNC: 1.9 MG/DL (ref 1.6–2.6)
MCH RBC QN AUTO: 34.4 PG (ref 26–35)
MCHC RBC AUTO-ENTMCNC: 32.8 % (ref 32–34.5)
MCV RBC AUTO: 104.7 FL (ref 80–99.9)
METER GLUCOSE: 184 MG/DL (ref 74–99)
METER GLUCOSE: 253 MG/DL (ref 74–99)
METER GLUCOSE: 433 MG/DL (ref 74–99)
METER GLUCOSE: 439 MG/DL (ref 74–99)
MONOCYTES # BLD: 0.97 E9/L (ref 0.1–0.95)
MONOCYTES NFR BLD: 10.5 % (ref 2–12)
NEUTROPHILS # BLD: 6.63 E9/L (ref 1.8–7.3)
NEUTS SEG NFR BLD: 72 % (ref 43–80)
NITRITE UR QL STRIP: POSITIVE
PH UR STRIP: 5 [PH] (ref 5–9)
PHOSPHATE SERPL-MCNC: 2.8 MG/DL (ref 2.5–4.5)
PLATELET # BLD AUTO: 159 E9/L (ref 130–450)
PMV BLD AUTO: 12.4 FL (ref 7–12)
POTASSIUM SERPL-SCNC: 4.3 MMOL/L (ref 3.5–5)
PROT SERPL-MCNC: 6 G/DL (ref 6.4–8.3)
PROT UR STRIP-MCNC: 100 MG/DL
RBC # BLD AUTO: 3.87 E12/L (ref 3.5–5.5)
RBC #/AREA URNS HPF: >20 /HPF (ref 0–2)
SODIUM SERPL-SCNC: 135 MMOL/L (ref 132–146)
SP GR UR STRIP: 1.02 (ref 1–1.03)
UROBILINOGEN UR STRIP-ACNC: 0.2 E.U./DL
WBC # BLD: 9.2 E9/L (ref 4.5–11.5)
WBC #/AREA URNS HPF: >20 /HPF (ref 0–5)
YEAST URNS QL MICRO: PRESENT /HPF

## 2023-06-29 PROCEDURE — 97530 THERAPEUTIC ACTIVITIES: CPT

## 2023-06-29 PROCEDURE — 83735 ASSAY OF MAGNESIUM: CPT

## 2023-06-29 PROCEDURE — 81001 URINALYSIS AUTO W/SCOPE: CPT

## 2023-06-29 PROCEDURE — 6370000000 HC RX 637 (ALT 250 FOR IP): Performed by: NURSE PRACTITIONER

## 2023-06-29 PROCEDURE — 36415 COLL VENOUS BLD VENIPUNCTURE: CPT

## 2023-06-29 PROCEDURE — 2580000003 HC RX 258: Performed by: INTERNAL MEDICINE

## 2023-06-29 PROCEDURE — 2700000000 HC OXYGEN THERAPY PER DAY

## 2023-06-29 PROCEDURE — 6360000002 HC RX W HCPCS: Performed by: NURSE PRACTITIONER

## 2023-06-29 PROCEDURE — 94640 AIRWAY INHALATION TREATMENT: CPT

## 2023-06-29 PROCEDURE — 84100 ASSAY OF PHOSPHORUS: CPT

## 2023-06-29 PROCEDURE — 85025 COMPLETE CBC W/AUTO DIFF WBC: CPT

## 2023-06-29 PROCEDURE — 86140 C-REACTIVE PROTEIN: CPT

## 2023-06-29 PROCEDURE — 82533 TOTAL CORTISOL: CPT

## 2023-06-29 PROCEDURE — 97110 THERAPEUTIC EXERCISES: CPT

## 2023-06-29 PROCEDURE — 1200000000 HC SEMI PRIVATE

## 2023-06-29 PROCEDURE — 80053 COMPREHEN METABOLIC PANEL: CPT

## 2023-06-29 PROCEDURE — 6370000000 HC RX 637 (ALT 250 FOR IP): Performed by: INTERNAL MEDICINE

## 2023-06-29 PROCEDURE — 94664 DEMO&/EVAL PT USE INHALER: CPT

## 2023-06-29 PROCEDURE — 2580000003 HC RX 258: Performed by: NURSE PRACTITIONER

## 2023-06-29 PROCEDURE — 82962 GLUCOSE BLOOD TEST: CPT

## 2023-06-29 RX ORDER — INSULIN LISPRO 100 [IU]/ML
0-8 INJECTION, SOLUTION INTRAVENOUS; SUBCUTANEOUS
Status: DISCONTINUED | OUTPATIENT
Start: 2023-06-29 | End: 2023-07-03 | Stop reason: HOSPADM

## 2023-06-29 RX ORDER — ZINC GLUCONATE 50 MG
50 TABLET ORAL DAILY
Status: DISCONTINUED | OUTPATIENT
Start: 2023-06-29 | End: 2023-07-03 | Stop reason: HOSPADM

## 2023-06-29 RX ORDER — DEXAMETHASONE SODIUM PHOSPHATE 10 MG/ML
6 INJECTION INTRAMUSCULAR; INTRAVENOUS EVERY 24 HOURS
Status: DISCONTINUED | OUTPATIENT
Start: 2023-06-29 | End: 2023-07-03 | Stop reason: HOSPADM

## 2023-06-29 RX ORDER — LISINOPRIL 20 MG/1
20 TABLET ORAL DAILY
Status: DISCONTINUED | OUTPATIENT
Start: 2023-06-29 | End: 2023-07-03 | Stop reason: HOSPADM

## 2023-06-29 RX ORDER — INSULIN LISPRO 100 [IU]/ML
0-4 INJECTION, SOLUTION INTRAVENOUS; SUBCUTANEOUS NIGHTLY
Status: DISCONTINUED | OUTPATIENT
Start: 2023-06-29 | End: 2023-07-03 | Stop reason: HOSPADM

## 2023-06-29 RX ORDER — ALBUTEROL SULFATE 90 UG/1
2 AEROSOL, METERED RESPIRATORY (INHALATION) EVERY 4 HOURS PRN
Status: DISCONTINUED | OUTPATIENT
Start: 2023-06-29 | End: 2023-07-03 | Stop reason: HOSPADM

## 2023-06-29 RX ORDER — ASCORBIC ACID 500 MG
1000 TABLET ORAL DAILY
Status: DISCONTINUED | OUTPATIENT
Start: 2023-06-29 | End: 2023-07-03 | Stop reason: HOSPADM

## 2023-06-29 RX ORDER — DOXYCYCLINE HYCLATE 100 MG/1
100 CAPSULE ORAL EVERY 12 HOURS SCHEDULED
Status: DISCONTINUED | OUTPATIENT
Start: 2023-06-29 | End: 2023-07-03 | Stop reason: HOSPADM

## 2023-06-29 RX ORDER — VITAMIN B COMPLEX
2000 TABLET ORAL DAILY
Status: DISCONTINUED | OUTPATIENT
Start: 2023-06-29 | End: 2023-07-03 | Stop reason: HOSPADM

## 2023-06-29 RX ORDER — BUDESONIDE AND FORMOTEROL FUMARATE DIHYDRATE 160; 4.5 UG/1; UG/1
2 AEROSOL RESPIRATORY (INHALATION) 2 TIMES DAILY
Status: DISCONTINUED | OUTPATIENT
Start: 2023-06-29 | End: 2023-07-03 | Stop reason: HOSPADM

## 2023-06-29 RX ADMIN — INSULIN LISPRO 4 UNITS: 100 INJECTION, SOLUTION INTRAVENOUS; SUBCUTANEOUS at 21:25

## 2023-06-29 RX ADMIN — DEXAMETHASONE SODIUM PHOSPHATE 6 MG: 10 INJECTION INTRAMUSCULAR; INTRAVENOUS at 09:49

## 2023-06-29 RX ADMIN — METOPROLOL TARTRATE 12.5 MG: 25 TABLET, FILM COATED ORAL at 09:57

## 2023-06-29 RX ADMIN — MESALAMINE 400 MG: 400 CAPSULE, DELAYED RELEASE ORAL at 15:39

## 2023-06-29 RX ADMIN — METOPROLOL TARTRATE 12.5 MG: 25 TABLET, FILM COATED ORAL at 21:13

## 2023-06-29 RX ADMIN — MESALAMINE 400 MG: 400 CAPSULE, DELAYED RELEASE ORAL at 09:46

## 2023-06-29 RX ADMIN — WATER 1000 MG: 1 INJECTION INTRAMUSCULAR; INTRAVENOUS; SUBCUTANEOUS at 09:48

## 2023-06-29 RX ADMIN — MESALAMINE 400 MG: 400 CAPSULE, DELAYED RELEASE ORAL at 21:13

## 2023-06-29 RX ADMIN — IPRATROPIUM BROMIDE AND ALBUTEROL 1 PUFF: 20; 100 SPRAY, METERED RESPIRATORY (INHALATION) at 22:04

## 2023-06-29 RX ADMIN — SODIUM CHLORIDE, PRESERVATIVE FREE 10 ML: 5 INJECTION INTRAVENOUS at 09:51

## 2023-06-29 RX ADMIN — LISINOPRIL 20 MG: 20 TABLET ORAL at 09:47

## 2023-06-29 RX ADMIN — DOXYCYCLINE HYCLATE 100 MG: 100 CAPSULE ORAL at 09:46

## 2023-06-29 RX ADMIN — PANTOPRAZOLE SODIUM 40 MG: 40 TABLET, DELAYED RELEASE ORAL at 05:33

## 2023-06-29 RX ADMIN — APIXABAN 2.5 MG: 2.5 TABLET, FILM COATED ORAL at 21:13

## 2023-06-29 RX ADMIN — SODIUM CHLORIDE, PRESERVATIVE FREE 10 ML: 5 INJECTION INTRAVENOUS at 21:29

## 2023-06-29 RX ADMIN — OXYCODONE HYDROCHLORIDE AND ACETAMINOPHEN 1000 MG: 500 TABLET ORAL at 09:47

## 2023-06-29 RX ADMIN — BUDESONIDE AND FORMOTEROL FUMARATE DIHYDRATE 2 PUFF: 160; 4.5 AEROSOL RESPIRATORY (INHALATION) at 17:43

## 2023-06-29 RX ADMIN — DOXYCYCLINE HYCLATE 100 MG: 100 CAPSULE ORAL at 21:13

## 2023-06-29 RX ADMIN — Medication 50 MG: at 09:46

## 2023-06-29 RX ADMIN — IPRATROPIUM BROMIDE AND ALBUTEROL 1 PUFF: 20; 100 SPRAY, METERED RESPIRATORY (INHALATION) at 10:32

## 2023-06-29 RX ADMIN — Medication 2000 UNITS: at 09:47

## 2023-06-29 RX ADMIN — APIXABAN 2.5 MG: 2.5 TABLET, FILM COATED ORAL at 09:50

## 2023-06-29 RX ADMIN — INSULIN LISPRO 4 UNITS: 100 INJECTION, SOLUTION INTRAVENOUS; SUBCUTANEOUS at 13:31

## 2023-06-29 RX ADMIN — BUDESONIDE AND FORMOTEROL FUMARATE DIHYDRATE 2 PUFF: 160; 4.5 AEROSOL RESPIRATORY (INHALATION) at 10:32

## 2023-06-29 RX ADMIN — IPRATROPIUM BROMIDE AND ALBUTEROL 1 PUFF: 20; 100 SPRAY, METERED RESPIRATORY (INHALATION) at 17:43

## 2023-06-29 RX ADMIN — INSULIN LISPRO 8 UNITS: 100 INJECTION, SOLUTION INTRAVENOUS; SUBCUTANEOUS at 17:04

## 2023-06-29 ASSESSMENT — PAIN SCALES - WONG BAKER
WONGBAKER_NUMERICALRESPONSE: 2
WONGBAKER_NUMERICALRESPONSE: 2

## 2023-06-29 ASSESSMENT — PAIN DESCRIPTION - LOCATION: LOCATION: ARM;ELBOW

## 2023-06-29 ASSESSMENT — PAIN SCALES - GENERAL: PAINLEVEL_OUTOF10: 4

## 2023-06-30 LAB
ALBUMIN SERPL-MCNC: 3.4 G/DL (ref 3.5–5.2)
ALP SERPL-CCNC: 103 U/L (ref 35–104)
ALT SERPL-CCNC: 24 U/L (ref 0–32)
ANION GAP SERPL CALCULATED.3IONS-SCNC: 14 MMOL/L (ref 7–16)
AST SERPL-CCNC: 23 U/L (ref 0–31)
BASOPHILS # BLD: 0.02 E9/L (ref 0–0.2)
BASOPHILS NFR BLD: 0.4 % (ref 0–2)
BILIRUB SERPL-MCNC: 0.4 MG/DL (ref 0–1.2)
BUN SERPL-MCNC: 27 MG/DL (ref 6–23)
CALCIUM SERPL-MCNC: 9.3 MG/DL (ref 8.6–10.2)
CHLORIDE SERPL-SCNC: 95 MMOL/L (ref 98–107)
CO2 SERPL-SCNC: 26 MMOL/L (ref 22–29)
CREAT SERPL-MCNC: 0.8 MG/DL (ref 0.5–1)
CRP SERPL HS-MCNC: 1.9 MG/DL (ref 0–0.4)
CRP SERPL HS-MCNC: 2.9 MG/DL (ref 0–0.4)
EOSINOPHIL # BLD: 0 E9/L (ref 0.05–0.5)
EOSINOPHIL NFR BLD: 0 % (ref 0–6)
ERYTHROCYTE [DISTWIDTH] IN BLOOD BY AUTOMATED COUNT: 14.1 FL (ref 11.5–15)
GLUCOSE SERPL-MCNC: 303 MG/DL (ref 74–99)
HBA1C MFR BLD HPLC: 12.6 %
HCT VFR BLD AUTO: 40 % (ref 34–48)
HGB BLD-MCNC: 12.9 G/DL (ref 11.5–15.5)
IMM GRANULOCYTES # BLD: 0.2 E9/L
IMM GRANULOCYTES NFR BLD: 3.6 % (ref 0–5)
LYMPHOCYTES # BLD: 0.62 E9/L (ref 1.5–4)
LYMPHOCYTES NFR BLD: 11.3 % (ref 20–42)
MCH RBC QN AUTO: 33.9 PG (ref 26–35)
MCHC RBC AUTO-ENTMCNC: 32.3 % (ref 32–34.5)
MCV RBC AUTO: 105 FL (ref 80–99.9)
METER GLUCOSE: 186 MG/DL (ref 74–99)
METER GLUCOSE: 262 MG/DL (ref 74–99)
METER GLUCOSE: 265 MG/DL (ref 74–99)
METER GLUCOSE: 316 MG/DL (ref 74–99)
MONOCYTES # BLD: 0.52 E9/L (ref 0.1–0.95)
MONOCYTES NFR BLD: 9.5 % (ref 2–12)
NEUTROPHILS # BLD: 4.12 E9/L (ref 1.8–7.3)
NEUTS SEG NFR BLD: 75.2 % (ref 43–80)
PLATELET # BLD AUTO: 166 E9/L (ref 130–450)
PMV BLD AUTO: 12.5 FL (ref 7–12)
POTASSIUM SERPL-SCNC: 4.6 MMOL/L (ref 3.5–5)
PROT SERPL-MCNC: 6.6 G/DL (ref 6.4–8.3)
RBC # BLD AUTO: 3.81 E12/L (ref 3.5–5.5)
SODIUM SERPL-SCNC: 135 MMOL/L (ref 132–146)
WBC # BLD: 5.5 E9/L (ref 4.5–11.5)

## 2023-06-30 PROCEDURE — 80053 COMPREHEN METABOLIC PANEL: CPT

## 2023-06-30 PROCEDURE — 6360000002 HC RX W HCPCS: Performed by: NURSE PRACTITIONER

## 2023-06-30 PROCEDURE — 86140 C-REACTIVE PROTEIN: CPT

## 2023-06-30 PROCEDURE — 82962 GLUCOSE BLOOD TEST: CPT

## 2023-06-30 PROCEDURE — 36415 COLL VENOUS BLD VENIPUNCTURE: CPT

## 2023-06-30 PROCEDURE — 85025 COMPLETE CBC W/AUTO DIFF WBC: CPT

## 2023-06-30 PROCEDURE — 92526 ORAL FUNCTION THERAPY: CPT

## 2023-06-30 PROCEDURE — 6370000000 HC RX 637 (ALT 250 FOR IP)

## 2023-06-30 PROCEDURE — 97530 THERAPEUTIC ACTIVITIES: CPT

## 2023-06-30 PROCEDURE — 97110 THERAPEUTIC EXERCISES: CPT

## 2023-06-30 PROCEDURE — 1200000000 HC SEMI PRIVATE

## 2023-06-30 PROCEDURE — 2580000003 HC RX 258: Performed by: NURSE PRACTITIONER

## 2023-06-30 PROCEDURE — 6370000000 HC RX 637 (ALT 250 FOR IP): Performed by: NURSE PRACTITIONER

## 2023-06-30 PROCEDURE — 2580000003 HC RX 258: Performed by: INTERNAL MEDICINE

## 2023-06-30 PROCEDURE — 6370000000 HC RX 637 (ALT 250 FOR IP): Performed by: INTERNAL MEDICINE

## 2023-06-30 PROCEDURE — 92610 EVALUATE SWALLOWING FUNCTION: CPT

## 2023-06-30 PROCEDURE — 94761 N-INVAS EAR/PLS OXIMETRY MLT: CPT

## 2023-06-30 PROCEDURE — 94640 AIRWAY INHALATION TREATMENT: CPT

## 2023-06-30 PROCEDURE — 2700000000 HC OXYGEN THERAPY PER DAY

## 2023-06-30 PROCEDURE — 97165 OT EVAL LOW COMPLEX 30 MIN: CPT

## 2023-06-30 RX ORDER — PETROLATUM 42 G/100G
OINTMENT TOPICAL 2 TIMES DAILY
Status: DISCONTINUED | OUTPATIENT
Start: 2023-06-30 | End: 2023-06-30 | Stop reason: CLARIF

## 2023-06-30 RX ORDER — INSULIN GLARGINE 100 [IU]/ML
15 INJECTION, SOLUTION SUBCUTANEOUS NIGHTLY
Status: DISCONTINUED | OUTPATIENT
Start: 2023-06-30 | End: 2023-07-01

## 2023-06-30 RX ORDER — PETROLATUM 420 MG/G
1 OINTMENT TOPICAL 2 TIMES DAILY
Status: DISCONTINUED | OUTPATIENT
Start: 2023-06-30 | End: 2023-07-03 | Stop reason: HOSPADM

## 2023-06-30 RX ORDER — PETROLATUM 42 G/100G
OINTMENT TOPICAL 3 TIMES DAILY PRN
Status: DISCONTINUED | OUTPATIENT
Start: 2023-06-30 | End: 2023-06-30 | Stop reason: CLARIF

## 2023-06-30 RX ORDER — PETROLATUM 420 MG/G
1 OINTMENT TOPICAL 3 TIMES DAILY PRN
Status: DISCONTINUED | OUTPATIENT
Start: 2023-06-30 | End: 2023-07-03 | Stop reason: HOSPADM

## 2023-06-30 RX ADMIN — DOXYCYCLINE HYCLATE 100 MG: 100 CAPSULE ORAL at 08:15

## 2023-06-30 RX ADMIN — INSULIN GLARGINE 15 UNITS: 100 INJECTION, SOLUTION SUBCUTANEOUS at 22:00

## 2023-06-30 RX ADMIN — METOPROLOL TARTRATE 12.5 MG: 25 TABLET, FILM COATED ORAL at 21:45

## 2023-06-30 RX ADMIN — MESALAMINE 400 MG: 400 CAPSULE, DELAYED RELEASE ORAL at 21:45

## 2023-06-30 RX ADMIN — APIXABAN 2.5 MG: 2.5 TABLET, FILM COATED ORAL at 21:49

## 2023-06-30 RX ADMIN — OXYCODONE HYDROCHLORIDE AND ACETAMINOPHEN 1000 MG: 500 TABLET ORAL at 08:16

## 2023-06-30 RX ADMIN — PANTOPRAZOLE SODIUM 40 MG: 40 TABLET, DELAYED RELEASE ORAL at 06:32

## 2023-06-30 RX ADMIN — DOXYCYCLINE HYCLATE 100 MG: 100 CAPSULE ORAL at 21:45

## 2023-06-30 RX ADMIN — MESALAMINE 400 MG: 400 CAPSULE, DELAYED RELEASE ORAL at 08:15

## 2023-06-30 RX ADMIN — MESALAMINE 400 MG: 400 CAPSULE, DELAYED RELEASE ORAL at 16:25

## 2023-06-30 RX ADMIN — IPRATROPIUM BROMIDE AND ALBUTEROL 1 PUFF: 20; 100 SPRAY, METERED RESPIRATORY (INHALATION) at 11:52

## 2023-06-30 RX ADMIN — INSULIN LISPRO 4 UNITS: 100 INJECTION, SOLUTION INTRAVENOUS; SUBCUTANEOUS at 08:16

## 2023-06-30 RX ADMIN — LISINOPRIL 20 MG: 20 TABLET ORAL at 08:15

## 2023-06-30 RX ADMIN — IPRATROPIUM BROMIDE AND ALBUTEROL 1 PUFF: 20; 100 SPRAY, METERED RESPIRATORY (INHALATION) at 17:55

## 2023-06-30 RX ADMIN — BUDESONIDE AND FORMOTEROL FUMARATE DIHYDRATE 2 PUFF: 160; 4.5 AEROSOL RESPIRATORY (INHALATION) at 06:21

## 2023-06-30 RX ADMIN — IPRATROPIUM BROMIDE AND ALBUTEROL 1 PUFF: 20; 100 SPRAY, METERED RESPIRATORY (INHALATION) at 06:21

## 2023-06-30 RX ADMIN — SODIUM CHLORIDE, PRESERVATIVE FREE 10 ML: 5 INJECTION INTRAVENOUS at 08:23

## 2023-06-30 RX ADMIN — Medication 2000 UNITS: at 08:15

## 2023-06-30 RX ADMIN — INSULIN LISPRO 4 UNITS: 100 INJECTION, SOLUTION INTRAVENOUS; SUBCUTANEOUS at 17:23

## 2023-06-30 RX ADMIN — APIXABAN 2.5 MG: 2.5 TABLET, FILM COATED ORAL at 08:15

## 2023-06-30 RX ADMIN — BUDESONIDE AND FORMOTEROL FUMARATE DIHYDRATE 2 PUFF: 160; 4.5 AEROSOL RESPIRATORY (INHALATION) at 17:55

## 2023-06-30 RX ADMIN — METOPROLOL TARTRATE 12.5 MG: 25 TABLET, FILM COATED ORAL at 08:15

## 2023-06-30 RX ADMIN — DEXAMETHASONE SODIUM PHOSPHATE 6 MG: 10 INJECTION INTRAMUSCULAR; INTRAVENOUS at 14:04

## 2023-06-30 RX ADMIN — INSULIN LISPRO 6 UNITS: 100 INJECTION, SOLUTION INTRAVENOUS; SUBCUTANEOUS at 12:00

## 2023-06-30 RX ADMIN — Medication 50 MG: at 16:24

## 2023-06-30 RX ADMIN — WATER 1000 MG: 1 INJECTION INTRAMUSCULAR; INTRAVENOUS; SUBCUTANEOUS at 08:16

## 2023-07-01 LAB
ALBUMIN SERPL-MCNC: 3.1 G/DL (ref 3.5–5.2)
ALP SERPL-CCNC: 85 U/L (ref 35–104)
ALT SERPL-CCNC: 20 U/L (ref 0–32)
ANION GAP SERPL CALCULATED.3IONS-SCNC: 11 MMOL/L (ref 7–16)
AST SERPL-CCNC: 23 U/L (ref 0–31)
BASOPHILS # BLD: 0 E9/L (ref 0–0.2)
BASOPHILS NFR BLD: 0.3 % (ref 0–2)
BILIRUB SERPL-MCNC: 0.4 MG/DL (ref 0–1.2)
BUN SERPL-MCNC: 28 MG/DL (ref 6–23)
CALCIUM SERPL-MCNC: 9 MG/DL (ref 8.6–10.2)
CHLORIDE SERPL-SCNC: 97 MMOL/L (ref 98–107)
CO2 SERPL-SCNC: 27 MMOL/L (ref 22–29)
CREAT SERPL-MCNC: 0.8 MG/DL (ref 0.5–1)
EOSINOPHIL # BLD: 0 E9/L (ref 0.05–0.5)
EOSINOPHIL NFR BLD: 0 % (ref 0–6)
ERYTHROCYTE [DISTWIDTH] IN BLOOD BY AUTOMATED COUNT: 14.4 FL (ref 11.5–15)
GLUCOSE SERPL-MCNC: 123 MG/DL (ref 74–99)
HCT VFR BLD AUTO: 36.3 % (ref 34–48)
HGB BLD-MCNC: 11.8 G/DL (ref 11.5–15.5)
LYMPHOCYTES # BLD: 0.31 E9/L (ref 1.5–4)
LYMPHOCYTES NFR BLD: 2.6 % (ref 20–42)
MCH RBC QN AUTO: 34 PG (ref 26–35)
MCHC RBC AUTO-ENTMCNC: 32.5 % (ref 32–34.5)
MCV RBC AUTO: 104.6 FL (ref 80–99.9)
METER GLUCOSE: 126 MG/DL (ref 74–99)
METER GLUCOSE: 163 MG/DL (ref 74–99)
METER GLUCOSE: 260 MG/DL (ref 74–99)
METER GLUCOSE: 272 MG/DL (ref 74–99)
METER GLUCOSE: 62 MG/DL (ref 74–99)
METER GLUCOSE: 62 MG/DL (ref 74–99)
METER GLUCOSE: 91 MG/DL (ref 74–99)
MONOCYTES # BLD: 0.1 E9/L (ref 0.1–0.95)
MONOCYTES NFR BLD: 0.9 % (ref 2–12)
NEUTROPHILS # BLD: 10.09 E9/L (ref 1.8–7.3)
NEUTS SEG NFR BLD: 96.5 % (ref 43–80)
PLATELET # BLD AUTO: 167 E9/L (ref 130–450)
PMV BLD AUTO: 12.5 FL (ref 7–12)
POTASSIUM SERPL-SCNC: 4 MMOL/L (ref 3.5–5)
PROT SERPL-MCNC: 5.8 G/DL (ref 6.4–8.3)
RBC # BLD AUTO: 3.47 E12/L (ref 3.5–5.5)
SODIUM SERPL-SCNC: 135 MMOL/L (ref 132–146)
WBC # BLD: 10.4 E9/L (ref 4.5–11.5)

## 2023-07-01 PROCEDURE — 80053 COMPREHEN METABOLIC PANEL: CPT

## 2023-07-01 PROCEDURE — 2700000000 HC OXYGEN THERAPY PER DAY

## 2023-07-01 PROCEDURE — 85025 COMPLETE CBC W/AUTO DIFF WBC: CPT

## 2023-07-01 PROCEDURE — 1200000000 HC SEMI PRIVATE

## 2023-07-01 PROCEDURE — 82962 GLUCOSE BLOOD TEST: CPT

## 2023-07-01 PROCEDURE — 6370000000 HC RX 637 (ALT 250 FOR IP): Performed by: NURSE PRACTITIONER

## 2023-07-01 PROCEDURE — 6360000002 HC RX W HCPCS: Performed by: INTERNAL MEDICINE

## 2023-07-01 PROCEDURE — 6360000002 HC RX W HCPCS: Performed by: NURSE PRACTITIONER

## 2023-07-01 PROCEDURE — 2580000003 HC RX 258: Performed by: INTERNAL MEDICINE

## 2023-07-01 PROCEDURE — 2580000003 HC RX 258: Performed by: NURSE PRACTITIONER

## 2023-07-01 PROCEDURE — 36415 COLL VENOUS BLD VENIPUNCTURE: CPT

## 2023-07-01 PROCEDURE — 6370000000 HC RX 637 (ALT 250 FOR IP): Performed by: INTERNAL MEDICINE

## 2023-07-01 PROCEDURE — 94640 AIRWAY INHALATION TREATMENT: CPT

## 2023-07-01 RX ORDER — INSULIN GLARGINE 100 [IU]/ML
8 INJECTION, SOLUTION SUBCUTANEOUS NIGHTLY
Status: DISCONTINUED | OUTPATIENT
Start: 2023-07-01 | End: 2023-07-03 | Stop reason: HOSPADM

## 2023-07-01 RX ADMIN — BUDESONIDE AND FORMOTEROL FUMARATE DIHYDRATE 2 PUFF: 160; 4.5 AEROSOL RESPIRATORY (INHALATION) at 07:07

## 2023-07-01 RX ADMIN — INSULIN GLARGINE 8 UNITS: 100 INJECTION, SOLUTION SUBCUTANEOUS at 20:42

## 2023-07-01 RX ADMIN — DOXYCYCLINE HYCLATE 100 MG: 100 CAPSULE ORAL at 09:19

## 2023-07-01 RX ADMIN — IPRATROPIUM BROMIDE AND ALBUTEROL 1 PUFF: 20; 100 SPRAY, METERED RESPIRATORY (INHALATION) at 18:14

## 2023-07-01 RX ADMIN — OXYCODONE HYDROCHLORIDE AND ACETAMINOPHEN 1000 MG: 500 TABLET ORAL at 09:19

## 2023-07-01 RX ADMIN — APIXABAN 2.5 MG: 2.5 TABLET, FILM COATED ORAL at 20:34

## 2023-07-01 RX ADMIN — BUDESONIDE AND FORMOTEROL FUMARATE DIHYDRATE 2 PUFF: 160; 4.5 AEROSOL RESPIRATORY (INHALATION) at 18:14

## 2023-07-01 RX ADMIN — IPRATROPIUM BROMIDE AND ALBUTEROL 1 PUFF: 20; 100 SPRAY, METERED RESPIRATORY (INHALATION) at 22:07

## 2023-07-01 RX ADMIN — DOXYCYCLINE HYCLATE 100 MG: 100 CAPSULE ORAL at 20:33

## 2023-07-01 RX ADMIN — METOPROLOL TARTRATE 12.5 MG: 25 TABLET, FILM COATED ORAL at 20:33

## 2023-07-01 RX ADMIN — SODIUM CHLORIDE, PRESERVATIVE FREE 10 ML: 5 INJECTION INTRAVENOUS at 09:20

## 2023-07-01 RX ADMIN — DEXAMETHASONE SODIUM PHOSPHATE 6 MG: 10 INJECTION INTRAMUSCULAR; INTRAVENOUS at 09:20

## 2023-07-01 RX ADMIN — LISINOPRIL 20 MG: 20 TABLET ORAL at 09:19

## 2023-07-01 RX ADMIN — INSULIN LISPRO 4 UNITS: 100 INJECTION, SOLUTION INTRAVENOUS; SUBCUTANEOUS at 17:56

## 2023-07-01 RX ADMIN — IPRATROPIUM BROMIDE AND ALBUTEROL 1 PUFF: 20; 100 SPRAY, METERED RESPIRATORY (INHALATION) at 07:07

## 2023-07-01 RX ADMIN — SODIUM CHLORIDE, PRESERVATIVE FREE 10 ML: 5 INJECTION INTRAVENOUS at 20:34

## 2023-07-01 RX ADMIN — MESALAMINE 400 MG: 400 CAPSULE, DELAYED RELEASE ORAL at 20:33

## 2023-07-01 RX ADMIN — PETROLATUM 1 APPLICATION: 420 OINTMENT TOPICAL at 05:37

## 2023-07-01 RX ADMIN — Medication 50 MG: at 09:19

## 2023-07-01 RX ADMIN — IPRATROPIUM BROMIDE AND ALBUTEROL 1 PUFF: 20; 100 SPRAY, METERED RESPIRATORY (INHALATION) at 01:19

## 2023-07-01 RX ADMIN — HYDROMORPHONE HYDROCHLORIDE 0.5 MG: 1 INJECTION, SOLUTION INTRAMUSCULAR; INTRAVENOUS; SUBCUTANEOUS at 16:54

## 2023-07-01 RX ADMIN — METOPROLOL TARTRATE 12.5 MG: 25 TABLET, FILM COATED ORAL at 09:20

## 2023-07-01 RX ADMIN — MESALAMINE 400 MG: 400 CAPSULE, DELAYED RELEASE ORAL at 15:01

## 2023-07-01 RX ADMIN — WATER 1000 MG: 1 INJECTION INTRAMUSCULAR; INTRAVENOUS; SUBCUTANEOUS at 09:20

## 2023-07-01 RX ADMIN — PETROLATUM 1 APPLICATION: 420 OINTMENT TOPICAL at 20:34

## 2023-07-01 RX ADMIN — MESALAMINE 400 MG: 400 CAPSULE, DELAYED RELEASE ORAL at 09:19

## 2023-07-01 RX ADMIN — PANTOPRAZOLE SODIUM 40 MG: 40 TABLET, DELAYED RELEASE ORAL at 05:38

## 2023-07-01 RX ADMIN — PETROLATUM 1 APPLICATION: 420 OINTMENT TOPICAL at 09:25

## 2023-07-01 RX ADMIN — Medication 2000 UNITS: at 09:19

## 2023-07-01 RX ADMIN — APIXABAN 2.5 MG: 2.5 TABLET, FILM COATED ORAL at 09:19

## 2023-07-01 RX ADMIN — IPRATROPIUM BROMIDE AND ALBUTEROL 1 PUFF: 20; 100 SPRAY, METERED RESPIRATORY (INHALATION) at 10:10

## 2023-07-01 RX ADMIN — DEXTROSE MONOHYDRATE 125 ML: 100 INJECTION, SOLUTION INTRAVENOUS at 05:49

## 2023-07-01 ASSESSMENT — PAIN SCALES - GENERAL: PAINLEVEL_OUTOF10: 10

## 2023-07-02 LAB
ALBUMIN SERPL-MCNC: 2.9 G/DL (ref 3.5–5.2)
ALP SERPL-CCNC: 79 U/L (ref 35–104)
ALT SERPL-CCNC: 16 U/L (ref 0–32)
ANION GAP SERPL CALCULATED.3IONS-SCNC: 9 MMOL/L (ref 7–16)
AST SERPL-CCNC: 16 U/L (ref 0–31)
BASOPHILS # BLD: 0.01 E9/L (ref 0–0.2)
BASOPHILS NFR BLD: 0.1 % (ref 0–2)
BILIRUB SERPL-MCNC: 0.2 MG/DL (ref 0–1.2)
BUN SERPL-MCNC: 28 MG/DL (ref 6–23)
CALCIUM SERPL-MCNC: 8.9 MG/DL (ref 8.6–10.2)
CHLORIDE SERPL-SCNC: 99 MMOL/L (ref 98–107)
CO2 SERPL-SCNC: 28 MMOL/L (ref 22–29)
CREAT SERPL-MCNC: 0.7 MG/DL (ref 0.5–1)
CRP SERPL HS-MCNC: 1.2 MG/DL (ref 0–0.4)
EOSINOPHIL # BLD: 0 E9/L (ref 0.05–0.5)
EOSINOPHIL NFR BLD: 0 % (ref 0–6)
ERYTHROCYTE [DISTWIDTH] IN BLOOD BY AUTOMATED COUNT: 14.4 FL (ref 11.5–15)
GLUCOSE SERPL-MCNC: 166 MG/DL (ref 74–99)
HCT VFR BLD AUTO: 35.1 % (ref 34–48)
HGB BLD-MCNC: 11.5 G/DL (ref 11.5–15.5)
IMM GRANULOCYTES # BLD: 0.21 E9/L
IMM GRANULOCYTES NFR BLD: 2.3 % (ref 0–5)
LYMPHOCYTES # BLD: 0.87 E9/L (ref 1.5–4)
LYMPHOCYTES NFR BLD: 9.7 % (ref 20–42)
MCH RBC QN AUTO: 34.3 PG (ref 26–35)
MCHC RBC AUTO-ENTMCNC: 32.8 % (ref 32–34.5)
MCV RBC AUTO: 104.8 FL (ref 80–99.9)
METER GLUCOSE: 163 MG/DL (ref 74–99)
METER GLUCOSE: 262 MG/DL (ref 74–99)
METER GLUCOSE: 359 MG/DL (ref 74–99)
METER GLUCOSE: 412 MG/DL (ref 74–99)
MONOCYTES # BLD: 0.66 E9/L (ref 0.1–0.95)
MONOCYTES NFR BLD: 7.3 % (ref 2–12)
NEUTROPHILS # BLD: 7.23 E9/L (ref 1.8–7.3)
NEUTS SEG NFR BLD: 80.6 % (ref 43–80)
PLATELET # BLD AUTO: 182 E9/L (ref 130–450)
PMV BLD AUTO: 12.2 FL (ref 7–12)
POTASSIUM SERPL-SCNC: 4.3 MMOL/L (ref 3.5–5)
PROT SERPL-MCNC: 5.5 G/DL (ref 6.4–8.3)
RBC # BLD AUTO: 3.35 E12/L (ref 3.5–5.5)
SODIUM SERPL-SCNC: 136 MMOL/L (ref 132–146)
WBC # BLD: 9 E9/L (ref 4.5–11.5)

## 2023-07-02 PROCEDURE — 82962 GLUCOSE BLOOD TEST: CPT

## 2023-07-02 PROCEDURE — 6360000002 HC RX W HCPCS: Performed by: NURSE PRACTITIONER

## 2023-07-02 PROCEDURE — 86140 C-REACTIVE PROTEIN: CPT

## 2023-07-02 PROCEDURE — 6370000000 HC RX 637 (ALT 250 FOR IP): Performed by: INTERNAL MEDICINE

## 2023-07-02 PROCEDURE — 2580000003 HC RX 258: Performed by: NURSE PRACTITIONER

## 2023-07-02 PROCEDURE — 2580000003 HC RX 258: Performed by: INTERNAL MEDICINE

## 2023-07-02 PROCEDURE — 85025 COMPLETE CBC W/AUTO DIFF WBC: CPT

## 2023-07-02 PROCEDURE — 97530 THERAPEUTIC ACTIVITIES: CPT

## 2023-07-02 PROCEDURE — 6370000000 HC RX 637 (ALT 250 FOR IP): Performed by: NURSE PRACTITIONER

## 2023-07-02 PROCEDURE — 36415 COLL VENOUS BLD VENIPUNCTURE: CPT

## 2023-07-02 PROCEDURE — 2700000000 HC OXYGEN THERAPY PER DAY

## 2023-07-02 PROCEDURE — 1200000000 HC SEMI PRIVATE

## 2023-07-02 PROCEDURE — 94640 AIRWAY INHALATION TREATMENT: CPT

## 2023-07-02 PROCEDURE — 80053 COMPREHEN METABOLIC PANEL: CPT

## 2023-07-02 RX ADMIN — MESALAMINE 400 MG: 400 CAPSULE, DELAYED RELEASE ORAL at 15:44

## 2023-07-02 RX ADMIN — LISINOPRIL 20 MG: 20 TABLET ORAL at 09:12

## 2023-07-02 RX ADMIN — APIXABAN 2.5 MG: 2.5 TABLET, FILM COATED ORAL at 09:13

## 2023-07-02 RX ADMIN — MESALAMINE 400 MG: 400 CAPSULE, DELAYED RELEASE ORAL at 09:13

## 2023-07-02 RX ADMIN — IPRATROPIUM BROMIDE AND ALBUTEROL 1 PUFF: 20; 100 SPRAY, METERED RESPIRATORY (INHALATION) at 11:06

## 2023-07-02 RX ADMIN — INSULIN LISPRO 4 UNITS: 100 INJECTION, SOLUTION INTRAVENOUS; SUBCUTANEOUS at 12:33

## 2023-07-02 RX ADMIN — MESALAMINE 400 MG: 400 CAPSULE, DELAYED RELEASE ORAL at 20:07

## 2023-07-02 RX ADMIN — IPRATROPIUM BROMIDE AND ALBUTEROL 1 PUFF: 20; 100 SPRAY, METERED RESPIRATORY (INHALATION) at 18:32

## 2023-07-02 RX ADMIN — INSULIN LISPRO 4 UNITS: 100 INJECTION, SOLUTION INTRAVENOUS; SUBCUTANEOUS at 20:09

## 2023-07-02 RX ADMIN — METOPROLOL TARTRATE 12.5 MG: 25 TABLET, FILM COATED ORAL at 09:12

## 2023-07-02 RX ADMIN — DOXYCYCLINE HYCLATE 100 MG: 100 CAPSULE ORAL at 20:08

## 2023-07-02 RX ADMIN — SODIUM CHLORIDE, PRESERVATIVE FREE 10 ML: 5 INJECTION INTRAVENOUS at 20:06

## 2023-07-02 RX ADMIN — PETROLATUM 1 APPLICATION: 420 OINTMENT TOPICAL at 09:18

## 2023-07-02 RX ADMIN — BUDESONIDE AND FORMOTEROL FUMARATE DIHYDRATE 2 PUFF: 160; 4.5 AEROSOL RESPIRATORY (INHALATION) at 06:07

## 2023-07-02 RX ADMIN — Medication 50 MG: at 09:21

## 2023-07-02 RX ADMIN — METOPROLOL TARTRATE 12.5 MG: 25 TABLET, FILM COATED ORAL at 20:07

## 2023-07-02 RX ADMIN — IPRATROPIUM BROMIDE AND ALBUTEROL 1 PUFF: 20; 100 SPRAY, METERED RESPIRATORY (INHALATION) at 21:27

## 2023-07-02 RX ADMIN — OXYCODONE HYDROCHLORIDE AND ACETAMINOPHEN 1000 MG: 500 TABLET ORAL at 09:13

## 2023-07-02 RX ADMIN — Medication 2000 UNITS: at 09:13

## 2023-07-02 RX ADMIN — DOXYCYCLINE HYCLATE 100 MG: 100 CAPSULE ORAL at 09:12

## 2023-07-02 RX ADMIN — DEXAMETHASONE SODIUM PHOSPHATE 6 MG: 10 INJECTION INTRAMUSCULAR; INTRAVENOUS at 09:13

## 2023-07-02 RX ADMIN — IPRATROPIUM BROMIDE AND ALBUTEROL 1 PUFF: 20; 100 SPRAY, METERED RESPIRATORY (INHALATION) at 06:07

## 2023-07-02 RX ADMIN — BUDESONIDE AND FORMOTEROL FUMARATE DIHYDRATE 2 PUFF: 160; 4.5 AEROSOL RESPIRATORY (INHALATION) at 18:32

## 2023-07-02 RX ADMIN — SODIUM CHLORIDE, PRESERVATIVE FREE 10 ML: 5 INJECTION INTRAVENOUS at 09:12

## 2023-07-02 RX ADMIN — WATER 1000 MG: 1 INJECTION INTRAMUSCULAR; INTRAVENOUS; SUBCUTANEOUS at 09:13

## 2023-07-02 RX ADMIN — INSULIN LISPRO 8 UNITS: 100 INJECTION, SOLUTION INTRAVENOUS; SUBCUTANEOUS at 16:49

## 2023-07-02 RX ADMIN — INSULIN GLARGINE 8 UNITS: 100 INJECTION, SOLUTION SUBCUTANEOUS at 20:09

## 2023-07-02 RX ADMIN — APIXABAN 2.5 MG: 2.5 TABLET, FILM COATED ORAL at 20:08

## 2023-07-02 ASSESSMENT — PAIN SCALES - GENERAL: PAINLEVEL_OUTOF10: 0

## 2023-07-03 VITALS
TEMPERATURE: 98.1 F | HEART RATE: 70 BPM | HEIGHT: 65 IN | WEIGHT: 115 LBS | OXYGEN SATURATION: 97 % | RESPIRATION RATE: 20 BRPM | DIASTOLIC BLOOD PRESSURE: 65 MMHG | BODY MASS INDEX: 19.16 KG/M2 | SYSTOLIC BLOOD PRESSURE: 131 MMHG

## 2023-07-03 LAB
ALBUMIN SERPL-MCNC: 3.1 G/DL (ref 3.5–5.2)
ALP SERPL-CCNC: 85 U/L (ref 35–104)
ALT SERPL-CCNC: 23 U/L (ref 0–32)
ANION GAP SERPL CALCULATED.3IONS-SCNC: 12 MMOL/L (ref 7–16)
AST SERPL-CCNC: 21 U/L (ref 0–31)
BASOPHILS # BLD: 0 E9/L (ref 0–0.2)
BASOPHILS NFR BLD: 0.2 % (ref 0–2)
BILIRUB SERPL-MCNC: 0.2 MG/DL (ref 0–1.2)
BUN SERPL-MCNC: 30 MG/DL (ref 6–23)
BURR CELLS: ABNORMAL
CALCIUM SERPL-MCNC: 9.1 MG/DL (ref 8.6–10.2)
CHLORIDE SERPL-SCNC: 98 MMOL/L (ref 98–107)
CO2 SERPL-SCNC: 26 MMOL/L (ref 22–29)
CREAT SERPL-MCNC: 1 MG/DL (ref 0.5–1)
EOSINOPHIL # BLD: 0 E9/L (ref 0.05–0.5)
EOSINOPHIL NFR BLD: 0 % (ref 0–6)
ERYTHROCYTE [DISTWIDTH] IN BLOOD BY AUTOMATED COUNT: 14.6 FL (ref 11.5–15)
GLUCOSE SERPL-MCNC: 118 MG/DL (ref 74–99)
HCT VFR BLD AUTO: 40.6 % (ref 34–48)
HGB BLD-MCNC: 13 G/DL (ref 11.5–15.5)
LYMPHOCYTES # BLD: 1.03 E9/L (ref 1.5–4)
LYMPHOCYTES NFR BLD: 9.6 % (ref 20–42)
MCH RBC QN AUTO: 33.7 PG (ref 26–35)
MCHC RBC AUTO-ENTMCNC: 32 % (ref 32–34.5)
MCV RBC AUTO: 105.2 FL (ref 80–99.9)
METER GLUCOSE: 110 MG/DL (ref 74–99)
METER GLUCOSE: 178 MG/DL (ref 74–99)
MONOCYTES # BLD: 0.82 E9/L (ref 0.1–0.95)
MONOCYTES NFR BLD: 7.8 % (ref 2–12)
MYELOCYTES NFR BLD MANUAL: 0.9 % (ref 0–0)
NEUTROPHILS # BLD: 8.55 E9/L (ref 1.8–7.3)
NEUTS SEG NFR BLD: 81.7 % (ref 43–80)
PLATELET # BLD AUTO: 189 E9/L (ref 130–450)
PMV BLD AUTO: 12.5 FL (ref 7–12)
POIKILOCYTES: ABNORMAL
POLYCHROMASIA: ABNORMAL
POTASSIUM SERPL-SCNC: 4.5 MMOL/L (ref 3.5–5)
PROT SERPL-MCNC: 5.8 G/DL (ref 6.4–8.3)
RBC # BLD AUTO: 3.86 E12/L (ref 3.5–5.5)
SODIUM SERPL-SCNC: 136 MMOL/L (ref 132–146)
WBC # BLD: 10.3 E9/L (ref 4.5–11.5)

## 2023-07-03 PROCEDURE — 97530 THERAPEUTIC ACTIVITIES: CPT

## 2023-07-03 PROCEDURE — 6370000000 HC RX 637 (ALT 250 FOR IP): Performed by: INTERNAL MEDICINE

## 2023-07-03 PROCEDURE — 2580000003 HC RX 258: Performed by: INTERNAL MEDICINE

## 2023-07-03 PROCEDURE — 80053 COMPREHEN METABOLIC PANEL: CPT

## 2023-07-03 PROCEDURE — 94640 AIRWAY INHALATION TREATMENT: CPT

## 2023-07-03 PROCEDURE — 85025 COMPLETE CBC W/AUTO DIFF WBC: CPT

## 2023-07-03 PROCEDURE — 82962 GLUCOSE BLOOD TEST: CPT

## 2023-07-03 PROCEDURE — 6370000000 HC RX 637 (ALT 250 FOR IP): Performed by: NURSE PRACTITIONER

## 2023-07-03 PROCEDURE — 2700000000 HC OXYGEN THERAPY PER DAY

## 2023-07-03 PROCEDURE — 2580000003 HC RX 258: Performed by: NURSE PRACTITIONER

## 2023-07-03 PROCEDURE — 36415 COLL VENOUS BLD VENIPUNCTURE: CPT

## 2023-07-03 PROCEDURE — 6360000002 HC RX W HCPCS: Performed by: NURSE PRACTITIONER

## 2023-07-03 PROCEDURE — 97110 THERAPEUTIC EXERCISES: CPT

## 2023-07-03 RX ORDER — BUDESONIDE AND FORMOTEROL FUMARATE DIHYDRATE 160; 4.5 UG/1; UG/1
2 AEROSOL RESPIRATORY (INHALATION) 2 TIMES DAILY
Qty: 10.2 G | Refills: 3 | Status: SHIPPED | OUTPATIENT
Start: 2023-07-03

## 2023-07-03 RX ORDER — PREDNISOLONE 5 MG/1
10 TABLET ORAL DAILY
Qty: 90 TABLET | Refills: 0 | Status: SHIPPED | OUTPATIENT
Start: 2023-07-13

## 2023-07-03 RX ORDER — DEXAMETHASONE 6 MG/1
6 TABLET ORAL
Qty: 7 TABLET | Refills: 0 | Status: SHIPPED | OUTPATIENT
Start: 2023-07-03 | End: 2023-07-10

## 2023-07-03 RX ORDER — DOXYCYCLINE HYCLATE 100 MG/1
100 CAPSULE ORAL EVERY 12 HOURS SCHEDULED
Qty: 14 CAPSULE | Refills: 0 | Status: SHIPPED | OUTPATIENT
Start: 2023-07-03 | End: 2023-07-10

## 2023-07-03 RX ORDER — INSULIN LISPRO 100 [IU]/ML
0-8 INJECTION, SOLUTION INTRAVENOUS; SUBCUTANEOUS
DISCHARGE
Start: 2023-07-03

## 2023-07-03 RX ORDER — ZINC GLUCONATE 50 MG
50 TABLET ORAL DAILY
Qty: 30 TABLET | Refills: 0 | Status: SHIPPED | OUTPATIENT
Start: 2023-07-04

## 2023-07-03 RX ORDER — ALBUTEROL SULFATE 90 UG/1
2 AEROSOL, METERED RESPIRATORY (INHALATION) EVERY 4 HOURS PRN
Qty: 18 G | Refills: 3 | Status: SHIPPED | OUTPATIENT
Start: 2023-07-03

## 2023-07-03 RX ORDER — INSULIN GLARGINE 100 [IU]/ML
8 INJECTION, SOLUTION SUBCUTANEOUS NIGHTLY
Qty: 10 ML | Refills: 3 | Status: SHIPPED | OUTPATIENT
Start: 2023-07-03

## 2023-07-03 RX ORDER — LISINOPRIL 20 MG/1
20 TABLET ORAL DAILY
Qty: 30 TABLET | Refills: 3 | Status: SHIPPED | OUTPATIENT
Start: 2023-07-04

## 2023-07-03 RX ORDER — CHOLECALCIFEROL (VITAMIN D3) 50 MCG
2000 TABLET ORAL DAILY
Qty: 30 TABLET | Refills: 0 | Status: SHIPPED | OUTPATIENT
Start: 2023-07-04 | End: 2023-08-03

## 2023-07-03 RX ADMIN — IPRATROPIUM BROMIDE AND ALBUTEROL 1 PUFF: 20; 100 SPRAY, METERED RESPIRATORY (INHALATION) at 10:27

## 2023-07-03 RX ADMIN — WATER 1000 MG: 1 INJECTION INTRAMUSCULAR; INTRAVENOUS; SUBCUTANEOUS at 09:16

## 2023-07-03 RX ADMIN — MESALAMINE 400 MG: 400 CAPSULE, DELAYED RELEASE ORAL at 09:17

## 2023-07-03 RX ADMIN — DOXYCYCLINE HYCLATE 100 MG: 100 CAPSULE ORAL at 09:17

## 2023-07-03 RX ADMIN — LISINOPRIL 20 MG: 20 TABLET ORAL at 09:17

## 2023-07-03 RX ADMIN — IPRATROPIUM BROMIDE AND ALBUTEROL 1 PUFF: 20; 100 SPRAY, METERED RESPIRATORY (INHALATION) at 06:56

## 2023-07-03 RX ADMIN — APIXABAN 2.5 MG: 2.5 TABLET, FILM COATED ORAL at 09:17

## 2023-07-03 RX ADMIN — Medication 2000 UNITS: at 09:16

## 2023-07-03 RX ADMIN — OXYCODONE HYDROCHLORIDE AND ACETAMINOPHEN 1000 MG: 500 TABLET ORAL at 09:17

## 2023-07-03 RX ADMIN — BUDESONIDE AND FORMOTEROL FUMARATE DIHYDRATE 2 PUFF: 160; 4.5 AEROSOL RESPIRATORY (INHALATION) at 06:56

## 2023-07-03 RX ADMIN — SODIUM CHLORIDE, PRESERVATIVE FREE 10 ML: 5 INJECTION INTRAVENOUS at 09:25

## 2023-07-03 RX ADMIN — DEXAMETHASONE SODIUM PHOSPHATE 6 MG: 10 INJECTION INTRAMUSCULAR; INTRAVENOUS at 09:16

## 2023-07-03 RX ADMIN — PANTOPRAZOLE SODIUM 40 MG: 40 TABLET, DELAYED RELEASE ORAL at 06:32

## 2023-07-03 RX ADMIN — PETROLATUM 1 APPLICATION: 420 OINTMENT TOPICAL at 09:25

## 2023-07-03 RX ADMIN — METOPROLOL TARTRATE 12.5 MG: 25 TABLET, FILM COATED ORAL at 09:16

## 2023-07-03 RX ADMIN — Medication 50 MG: at 09:17

## 2023-07-03 NOTE — CARE COORDINATION
Patient is COVID POSITIVE. Auth obtained for REBECCA Oconnell to DC per .  Have arranged transport via [facility] for 11am, transport form complete. Have notified daughter Norma Due of DC and time. HENS done, JANE signed.

## 2023-07-03 NOTE — DISCHARGE SUMMARY
Internal Medicine Progress Note     [facility]     Reji Gillespie. Cheryle Polk., ADELSO.A.C.OUmmI. Nataly Doherty D.O., ROXYORAUDEL. Rebecca Morley D.O. Jose Figueroa, MSN, APRN, NP-C  Sissy Leyden. Sandee Martinez, MSN, [address] [place] [address]       Internal Medicine  Discharge Summary    NAME: Claire Carter  :  3/5/1929  MRN:  [id number]  Jessee Garner MD  ADMITTED: 2023      DISCHARGED: 7/3/23    ADMITTING PHYSICIAN: Reji Rodriguez DO    CONSULTANT(S):   IP CONSULT TO SOCIAL WORK  IP CONSULT TO PHARMACY     ADMITTING DIAGNOSIS:   Open wound [T14. 8XXA]  Failure to thrive in adult [R62.7]  Fall, initial encounter [W19. XXXA]     DISCHARGE DIAGNOSES:   Acute COVID-19 infection with failure to thrive symptomatology   Hypertensive urgency with underlying essential hypertension  Extensive upper and lower extremity wounds  Acute cystitis   History of atrial fibrillation/sick sinus syndrome status post pacemaker placement on chronic Eliquis therapy  Non-insulin-dependent diabetes mellitus type 2  Gastroesophageal reflux disease  Hyperlipidemia  Hypothyroidism    BRIEF HISTORY OF PRESENT ILLNESS:   Patient is 80-year-old female presented to the ED from home due to pain associated with dressing wounds. Patient has wounds to her bilateral upper extremities. She had fallen about 5 days ago and suffered multiple skin tears. She has multiple wounds to her upper extremities. Additionally she has blisters to her upper extremities and groin/medial thigh bilaterally. Patient has been having significant pain with dressing changes. Patient denies any fever or chills.     LABS[de-identified]  Lab Results   Component Value Date    WBC 9.0 2023    HGB 11.5 2023    HCT 35.1 2023     2023     2023    K 4.5 2023    CL 98 2023    CREATININE 1.0 2023    BUN 30 (H) 2023    CO2 26 2023    GLUCOSE 118 (H) 2023    ALT 23 2023    AST 21

## 2023-07-07 NOTE — PROGRESS NOTES
Internal Medicine Progress Note    [facility]    Ibrahima Rg. Med العلي., ROXYOUmmI. Mickie Robertson D.O., CHON Sotelo D.O. Guerline Barrett, MSN, APRN, NP-C  Ayo Duke, MSN, APRN-CNP     Primary Care Physician: Tristen Hernández MD   Admitting Physician:  Kenyatta Villalba DO  Admission date and time: 6/27/2023  5:01 PM    Room:  [address] [place] [address]  Admitting diagnosis: Open wound [T14. 8XXA]  Failure to thrive in adult [R62.7]  Fall, initial encounter [W19. XXXA]    Patient Name: Maria Elena Webster  MRN: [id number]    Date of Service: 7/5/2023     Subjective: Yancy Block is a 80 y.o. female who was seen and examined today,7/5/2023, at the bedside. Patient doing better today. Working with physical therapy. Blood sugars and appetite better. Probably discharge tomorrow    No family member present      Review of System:   Constitutional:   Positive for significant fatigue and malaise   HEENT:   Denies ear pain, sore throat, sinus or eye problems. Cardiovascular:   Denies any chest pain, irregular heartbeats, or palpitations. Respiratory:   - dyspnea at rest, - dyspnea on exertion, + coughing, - sputum, - hemoptysis  Gastrointestinal:   Denies nausea, vomiting. - diarrhea, - constipation. + poor appetite and poor intake. Denies any abdominal pain. Genitourinary:    Denies any urgency, frequency, hematuria. Voiding  without difficulty. Extremities:   Denies lower extremity swelling, edema or cyanosis. Neurology:    Denies any headache or focal neurological deficits, positive for generalized weakness and fatigue without focal component  Psch:   Denies being anxious or depressed. Musculoskeletal:    Denies  myalgias, joint complaints or back pain. Integumentary:   Wounds as documented. Denies any rashes, ulcers, or excoriations. Denies bruising. Hematologic/Lymphatic:  Denies bruising or bleeding.     Physical Exam:  I/O this shift:  In: 240 [P.O.:240]  Out:
Nurse to nurse given to Emily Bingham RN from [facility].
Physical Therapy Treatment Note/Plan of Care    Room #:  0391/9790-56  Patient Name: Isaias Melgoza  YOB: 1929  MRN: [id number]    Date of Service: 7/3/2023     Tentative placement recommendation: Subacute Rehab  Equipment recommendation: To be determined      Evaluating Physical Therapist: Shelbie Zee, PT #[id number]      Specific Provider Orders/Date/Referring Provider :  06/28/23 0400    PT eval and treat  Start:  06/28/23 0400,   End:  06/28/23 0400,   ONE TIME,   Standing Count:  1 Occurrences,   R         Giancarlo Rios DO     Admitting Diagnosis:   Open wound [T14. 8XXA]  Failure to thrive in adult [R62.7]  Fall, initial encounter [W19. XXXA]     wounds to both upper extremities. Patient was seen in the emergency department 5 days ago for a mechanical fall and skin tears. Surgery: none  Visit Diagnoses         Codes    Open wound    -  Primary T14. Mikle Angle, initial encounter     Extension Karol Ansari. XXXA            Patient Active Problem List   Diagnosis    Substernal thyroid    Right cataract    Left cataract    SBO (small bowel obstruction) ([facility])    Pneumonia of left lung due to infectious organism    Incarcerated inguinal hernia, unilateral    Failure to thrive in adult    Moderate protein-calorie malnutrition ([address])    COVID-19        ASSESSMENT of Current Deficits Patient exhibits decreased strength, balance, and endurance impairing functional mobility, transfers, gait , gait distance, and tolerance to activity. Patient requires minimal assist for all function this session. Pt ambulated on 4 L O2, spo2 ranging 97-99%. Pt demonstrated decreased ashley  and step length during gait. Pt educated and performed use of spirometer. Patient requires continued skilled physical therapy to address concerns listed above for increased safety and function at discharge.          PHYSICAL THERAPY  PLAN OF CARE       Physical therapy plan of care is established based on physician order,  patient diagnosis and clinical
prednisone ,   Multiple vitamins and inhaler therapy, doxycycline, PT/OT, ECF placement,   4->3.4-4-2LNC noted in the Epic documentation    Thank you, Malia Spears, RN, CDS ([phone])      Acute Respiratory Failure Clinical Indicators per  MS-DRG Training Guide and   Quick Reference Guide:  pO2 < 60 mmHg or SpO2 (pulse oximetry) < 91% breathing room air  pCO2 > 50 and pH < 7.35  P/F ratio (pO2 / FIO2) < 300  pO2 decrease or pCO2 increase by 10 mmHg from baseline (if known)  Supplemental oxygen of 40% or more  Presence of respiratory distress, tachypnea, dyspnea, shortness of breath,   wheezing  Unable to speak in complete sentences  Use of accessory muscles to breathe  Extreme anxiety and feeling of impending doom  Tripod position  Confusion/altered mental status/obtunded  Options provided:  -- Acute Respiratory Failure as evidenced by, Please document evidence.   -- Acute Respiratory Failure ruled out after study  -- Other - I will add my own diagnosis  -- Disagree - Not applicable / Not valid  -- Disagree - Clinically unable to determine / Unknown  -- Refer to Clinical Documentation Reviewer    PROVIDER RESPONSE TEXT:    This patient is in acute respiratory failure as evidenced by    Query created by: Malia Spears on 7/6/2023 2:13 PM      Electronically signed by:  Rita Sandoval DO 7/7/2023 2:42 PM

## 2024-01-01 ENCOUNTER — APPOINTMENT (OUTPATIENT)
Dept: GENERAL RADIOLOGY | Age: 89
DRG: 177 | End: 2024-01-01
Payer: MEDICARE

## 2024-01-01 ENCOUNTER — APPOINTMENT (OUTPATIENT)
Dept: CT IMAGING | Age: 89
DRG: 177 | End: 2024-01-01
Payer: MEDICARE

## 2024-01-01 ENCOUNTER — HOSPITAL ENCOUNTER (INPATIENT)
Age: 89
LOS: 4 days | Discharge: HOSPICE | DRG: 177 | End: 2024-01-05
Attending: EMERGENCY MEDICINE | Admitting: INTERNAL MEDICINE
Payer: MEDICARE

## 2024-01-01 DIAGNOSIS — E87.6 HYPOKALEMIA: ICD-10-CM

## 2024-01-01 DIAGNOSIS — E16.2 HYPOGLYCEMIA: ICD-10-CM

## 2024-01-01 DIAGNOSIS — R41.82 ALTERED MENTAL STATUS, UNSPECIFIED ALTERED MENTAL STATUS TYPE: Primary | ICD-10-CM
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
## 2024-01-01 DIAGNOSIS — I50.31 ACUTE DIASTOLIC CHF (CONGESTIVE HEART FAILURE) (HCC): ICD-10-CM

## 2024-01-01 DIAGNOSIS — S81.812A SKIN TEAR OF LEFT LOWER LEG WITHOUT COMPLICATION, INITIAL ENCOUNTER: ICD-10-CM

## 2024-01-01 DIAGNOSIS — N39.0 URINARY TRACT INFECTION WITHOUT HEMATURIA, SITE UNSPECIFIED: ICD-10-CM

## 2024-01-01 DIAGNOSIS — J90 BILATERAL PLEURAL EFFUSION: ICD-10-CM

## 2024-01-01 LAB
ALBUMIN SERPL-MCNC: 3.3 G/DL (ref 3.5–5.2)
ALP SERPL-CCNC: 68 U/L (ref 35–104)
ALT SERPL-CCNC: 10 U/L (ref 0–32)
AMPHET UR QL SCN: NEGATIVE
ANION GAP SERPL CALCULATED.3IONS-SCNC: 15 MMOL/L (ref 7–16)
APAP SERPL-MCNC: <5 UG/ML (ref 10–30)
AST SERPL-CCNC: 19 U/L (ref 0–31)
BACTERIA URNS QL MICRO: ABNORMAL
BARBITURATES UR QL SCN: NEGATIVE
BASOPHILS # BLD: 0.04 K/UL (ref 0–0.2)
BASOPHILS NFR BLD: 0 % (ref 0–2)
BENZODIAZ UR QL: NEGATIVE
BILIRUB SERPL-MCNC: 0.5 MG/DL (ref 0–1.2)
BILIRUB UR QL STRIP: NEGATIVE
BNP SERPL-MCNC: 1757 PG/ML (ref 0–450)
BUN SERPL-MCNC: 22 MG/DL (ref 6–23)
BUPRENORPHINE UR QL: NEGATIVE
CALCIUM SERPL-MCNC: 9 MG/DL (ref 8.6–10.2)
CANNABINOIDS UR QL SCN: NEGATIVE
CHLORIDE SERPL-SCNC: 96 MMOL/L (ref 98–107)
CK SERPL-CCNC: 40 U/L (ref 20–180)
CLARITY UR: ABNORMAL
CO2 SERPL-SCNC: 33 MMOL/L (ref 22–29)
COCAINE UR QL SCN: NEGATIVE
COLOR UR: YELLOW
CREAT SERPL-MCNC: 0.8 MG/DL (ref 0.5–1)
EOSINOPHIL # BLD: 0.01 K/UL (ref 0.05–0.5)
EOSINOPHILS RELATIVE PERCENT: 0 % (ref 0–6)
EPI CELLS #/AREA URNS HPF: ABNORMAL /HPF
ERYTHROCYTE [DISTWIDTH] IN BLOOD BY AUTOMATED COUNT: 14.6 % (ref 11.5–15)
ETHANOLAMINE SERPL-MCNC: <10 MG/DL
FENTANYL UR QL: NEGATIVE
GFR SERPL CREATININE-BSD FRML MDRD: >60 ML/MIN/1.73M2
GLUCOSE BLD-MCNC: 136 MG/DL (ref 74–99)
GLUCOSE BLD-MCNC: 152 MG/DL (ref 74–99)
GLUCOSE BLD-MCNC: 47 MG/DL (ref 74–99)
GLUCOSE BLD-MCNC: 58 MG/DL (ref 74–99)
GLUCOSE BLD-MCNC: 60 MG/DL (ref 74–99)
GLUCOSE BLD-MCNC: 71 MG/DL (ref 74–99)
GLUCOSE SERPL-MCNC: 41 MG/DL (ref 74–99)
GLUCOSE UR STRIP-MCNC: NEGATIVE MG/DL
HCT VFR BLD AUTO: 37.5 % (ref 34–48)
HGB BLD-MCNC: 12.1 G/DL (ref 11.5–15.5)
HGB UR QL STRIP.AUTO: ABNORMAL
INFLUENZA A BY PCR: NOT DETECTED
INFLUENZA B BY PCR: NOT DETECTED
KETONES UR STRIP-MCNC: ABNORMAL MG/DL
LACTATE BLDV-SCNC: 0.8 MMOL/L (ref 0.5–2.2)
LEUKOCYTE ESTERASE UR QL STRIP: ABNORMAL
LIPASE SERPL-CCNC: 14 U/L (ref 13–60)
LYMPHOCYTES NFR BLD: 1.61 K/UL (ref 1.5–4)
LYMPHOCYTES RELATIVE PERCENT: 17 % (ref 20–42)
MAGNESIUM SERPL-MCNC: 2 MG/DL (ref 1.6–2.6)
MCH RBC QN AUTO: 35.3 PG (ref 26–35)
MCHC RBC AUTO-ENTMCNC: 32.3 G/DL (ref 32–34.5)
MCV RBC AUTO: 109.3 FL (ref 80–99.9)
METHADONE UR QL: NEGATIVE
MONOCYTES NFR BLD: 0.86 K/UL (ref 0.1–0.95)
MONOCYTES NFR BLD: 9 % (ref 2–12)
MUCOUS THREADS URNS QL MICRO: PRESENT
NEUTROPHILS NFR BLD: 69 % (ref 43–80)
NEUTS SEG NFR BLD: 6.41 K/UL (ref 1.8–7.3)
NITRITE UR QL STRIP: NEGATIVE
OPIATES UR QL SCN: NEGATIVE
OXYCODONE UR QL SCN: NEGATIVE
PCP UR QL SCN: NEGATIVE
PH UR STRIP: 7 [PH] (ref 5–9)
PLATELET # BLD AUTO: 157 K/UL (ref 130–450)
PMV BLD AUTO: 13.3 FL (ref 7–12)
POTASSIUM SERPL-SCNC: 3.2 MMOL/L (ref 3.5–5)
PROT SERPL-MCNC: 5.9 G/DL (ref 6.4–8.3)
PROT UR STRIP-MCNC: 30 MG/DL
RBC # BLD AUTO: 3.43 M/UL (ref 3.5–5.5)
RBC #/AREA URNS HPF: ABNORMAL /HPF
SALICYLATES SERPL-MCNC: <0.3 MG/DL (ref 0–30)
SARS-COV-2 RDRP RESP QL NAA+PROBE: NOT DETECTED
SODIUM SERPL-SCNC: 144 MMOL/L (ref 132–146)
SP GR UR STRIP: 1.01 (ref 1–1.03)
SPECIMEN DESCRIPTION: NORMAL
T4 FREE SERPL-MCNC: 1.5 NG/DL (ref 0.9–1.7)
TEST INFORMATION: NORMAL
TOXIC TRICYCLIC SC,BLOOD: NEGATIVE
TROPONIN I SERPL HS-MCNC: 29 NG/L (ref 0–9)
TROPONIN I SERPL HS-MCNC: 55 NG/L (ref 0–9)
TSH SERPL DL<=0.05 MIU/L-ACNC: 0.65 UIU/ML (ref 0.27–4.2)
UROBILINOGEN UR STRIP-ACNC: 0.2 EU/DL (ref 0–1)
WBC #/AREA URNS HPF: ABNORMAL /HPF
WBC OTHER # BLD: 9.3 K/UL (ref 4.5–11.5)

## 2024-01-01 PROCEDURE — 73502 X-RAY EXAM HIP UNI 2-3 VIEWS: CPT

## 2024-01-01 PROCEDURE — 84443 ASSAY THYROID STIM HORMONE: CPT

## 2024-01-01 PROCEDURE — 6360000004 HC RX CONTRAST MEDICATION: Performed by: RADIOLOGY

## 2024-01-01 PROCEDURE — 0202U NFCT DS 22 TRGT SARS-COV-2: CPT

## 2024-01-01 PROCEDURE — 96374 THER/PROPH/DIAG INJ IV PUSH: CPT

## 2024-01-01 PROCEDURE — 70450 CT HEAD/BRAIN W/O DYE: CPT

## 2024-01-01 PROCEDURE — 83605 ASSAY OF LACTIC ACID: CPT

## 2024-01-01 PROCEDURE — 84145 PROCALCITONIN (PCT): CPT

## 2024-01-01 PROCEDURE — 99285 EMERGENCY DEPT VISIT HI MDM: CPT

## 2024-01-01 PROCEDURE — 87449 NOS EACH ORGANISM AG IA: CPT

## 2024-01-01 PROCEDURE — 71045 X-RAY EXAM CHEST 1 VIEW: CPT

## 2024-01-01 PROCEDURE — 6360000002 HC RX W HCPCS: Performed by: EMERGENCY MEDICINE

## 2024-01-01 PROCEDURE — 84439 ASSAY OF FREE THYROXINE: CPT

## 2024-01-01 PROCEDURE — 83880 ASSAY OF NATRIURETIC PEPTIDE: CPT

## 2024-01-01 PROCEDURE — 87899 AGENT NOS ASSAY W/OPTIC: CPT

## 2024-01-01 PROCEDURE — 81001 URINALYSIS AUTO W/SCOPE: CPT

## 2024-01-01 PROCEDURE — 80179 DRUG ASSAY SALICYLATE: CPT

## 2024-01-01 PROCEDURE — 85025 COMPLETE CBC W/AUTO DIFF WBC: CPT

## 2024-01-01 PROCEDURE — 87086 URINE CULTURE/COLONY COUNT: CPT

## 2024-01-01 PROCEDURE — 72125 CT NECK SPINE W/O DYE: CPT

## 2024-01-01 PROCEDURE — 83690 ASSAY OF LIPASE: CPT

## 2024-01-01 PROCEDURE — 73560 X-RAY EXAM OF KNEE 1 OR 2: CPT

## 2024-01-01 PROCEDURE — 96375 TX/PRO/DX INJ NEW DRUG ADDON: CPT

## 2024-01-01 PROCEDURE — 82962 GLUCOSE BLOOD TEST: CPT

## 2024-01-01 PROCEDURE — 71260 CT THORAX DX C+: CPT

## 2024-01-01 PROCEDURE — 82550 ASSAY OF CK (CPK): CPT

## 2024-01-01 PROCEDURE — G0480 DRUG TEST DEF 1-7 CLASSES: HCPCS

## 2024-01-01 PROCEDURE — 6360000002 HC RX W HCPCS: Performed by: INTERNAL MEDICINE

## 2024-01-01 PROCEDURE — 83036 HEMOGLOBIN GLYCOSYLATED A1C: CPT

## 2024-01-01 PROCEDURE — 83735 ASSAY OF MAGNESIUM: CPT

## 2024-01-01 PROCEDURE — 80307 DRUG TEST PRSMV CHEM ANLYZR: CPT

## 2024-01-01 PROCEDURE — 6360000002 HC RX W HCPCS: Performed by: STUDENT IN AN ORGANIZED HEALTH CARE EDUCATION/TRAINING PROGRAM

## 2024-01-01 PROCEDURE — 2580000003 HC RX 258: Performed by: STUDENT IN AN ORGANIZED HEALTH CARE EDUCATION/TRAINING PROGRAM

## 2024-01-01 PROCEDURE — 87502 INFLUENZA DNA AMP PROBE: CPT

## 2024-01-01 PROCEDURE — 96361 HYDRATE IV INFUSION ADD-ON: CPT

## 2024-01-01 PROCEDURE — 84484 ASSAY OF TROPONIN QUANT: CPT

## 2024-01-01 PROCEDURE — 80143 DRUG ASSAY ACETAMINOPHEN: CPT

## 2024-01-01 PROCEDURE — 2580000003 HC RX 258: Performed by: INTERNAL MEDICINE

## 2024-01-01 PROCEDURE — 80053 COMPREHEN METABOLIC PANEL: CPT

## 2024-01-01 PROCEDURE — 87635 SARS-COV-2 COVID-19 AMP PRB: CPT

## 2024-01-01 PROCEDURE — 73590 X-RAY EXAM OF LOWER LEG: CPT

## 2024-01-01 PROCEDURE — 93005 ELECTROCARDIOGRAM TRACING: CPT | Performed by: STUDENT IN AN ORGANIZED HEALTH CARE EDUCATION/TRAINING PROGRAM

## 2024-01-01 PROCEDURE — 2060000000 HC ICU INTERMEDIATE R&B

## 2024-01-01 PROCEDURE — 36415 COLL VENOUS BLD VENIPUNCTURE: CPT

## 2024-01-01 PROCEDURE — 6370000000 HC RX 637 (ALT 250 FOR IP): Performed by: INTERNAL MEDICINE

## 2024-01-01 RX ORDER — POTASSIUM CHLORIDE 20 MEQ/1
40 TABLET, EXTENDED RELEASE ORAL PRN
Status: DISCONTINUED | OUTPATIENT
Start: 2024-01-01 | End: 2024-01-05 | Stop reason: HOSPADM

## 2024-01-01 RX ORDER — MAGNESIUM SULFATE IN WATER 40 MG/ML
2000 INJECTION, SOLUTION INTRAVENOUS PRN
Status: DISCONTINUED | OUTPATIENT
Start: 2024-01-01 | End: 2024-01-05 | Stop reason: HOSPADM

## 2024-01-01 RX ORDER — ATORVASTATIN CALCIUM 10 MG/1
20 TABLET, FILM COATED ORAL DAILY
Status: DISCONTINUED | OUTPATIENT
Start: 2024-01-02 | End: 2024-01-05 | Stop reason: HOSPADM

## 2024-01-01 RX ORDER — ONDANSETRON 2 MG/ML
4 INJECTION INTRAMUSCULAR; INTRAVENOUS EVERY 6 HOURS PRN
Status: DISCONTINUED | OUTPATIENT
Start: 2024-01-01 | End: 2024-01-01 | Stop reason: ALTCHOICE

## 2024-01-01 RX ORDER — SIMVASTATIN 40 MG
40 TABLET ORAL NIGHTLY
Status: DISCONTINUED | OUTPATIENT
Start: 2024-01-01 | End: 2024-01-01 | Stop reason: CLARIF

## 2024-01-01 RX ORDER — INSULIN LISPRO 100 [IU]/ML
0-4 INJECTION, SOLUTION INTRAVENOUS; SUBCUTANEOUS
Status: DISCONTINUED | OUTPATIENT
Start: 2024-01-02 | End: 2024-01-05 | Stop reason: HOSPADM

## 2024-01-01 RX ORDER — LISINOPRIL 20 MG/1
20 TABLET ORAL DAILY
Status: DISCONTINUED | OUTPATIENT
Start: 2024-01-02 | End: 2024-01-05 | Stop reason: HOSPADM

## 2024-01-01 RX ORDER — INSULIN LISPRO 100 [IU]/ML
0-4 INJECTION, SOLUTION INTRAVENOUS; SUBCUTANEOUS NIGHTLY
Status: DISCONTINUED | OUTPATIENT
Start: 2024-01-01 | End: 2024-01-05 | Stop reason: HOSPADM

## 2024-01-01 RX ORDER — PANTOPRAZOLE SODIUM 40 MG/1
40 TABLET, DELAYED RELEASE ORAL
Status: DISCONTINUED | OUTPATIENT
Start: 2024-01-02 | End: 2024-01-05 | Stop reason: HOSPADM

## 2024-01-01 RX ORDER — PREDNISOLONE 5 MG/1
10 TABLET ORAL DAILY
Status: DISCONTINUED | OUTPATIENT
Start: 2024-01-02 | End: 2024-01-02

## 2024-01-01 RX ORDER — MESALAMINE 1.2 G/1
1200 TABLET, DELAYED RELEASE ORAL
Status: DISCONTINUED | OUTPATIENT
Start: 2024-01-02 | End: 2024-01-01 | Stop reason: CLARIF

## 2024-01-01 RX ORDER — DEXTROSE MONOHYDRATE 100 MG/ML
INJECTION, SOLUTION INTRAVENOUS CONTINUOUS PRN
Status: DISCONTINUED | OUTPATIENT
Start: 2024-01-01 | End: 2024-01-01 | Stop reason: SDUPTHER

## 2024-01-01 RX ORDER — POTASSIUM CHLORIDE 7.45 MG/ML
10 INJECTION INTRAVENOUS PRN
Status: DISCONTINUED | OUTPATIENT
Start: 2024-01-01 | End: 2024-01-05 | Stop reason: HOSPADM

## 2024-01-01 RX ORDER — MECOBALAMIN 5000 MCG
5 TABLET,DISINTEGRATING ORAL
Status: DISCONTINUED | OUTPATIENT
Start: 2024-01-01 | End: 2024-01-05 | Stop reason: HOSPADM

## 2024-01-01 RX ORDER — POTASSIUM CHLORIDE 7.45 MG/ML
10 INJECTION INTRAVENOUS
Status: DISPENSED | OUTPATIENT
Start: 2024-01-01 | End: 2024-01-01

## 2024-01-01 RX ORDER — ALBUTEROL SULFATE 2.5 MG/3ML
2.5 SOLUTION RESPIRATORY (INHALATION) EVERY 6 HOURS PRN
Status: DISCONTINUED | OUTPATIENT
Start: 2024-01-01 | End: 2024-01-05 | Stop reason: HOSPADM

## 2024-01-01 RX ORDER — SODIUM CHLORIDE 9 MG/ML
INJECTION, SOLUTION INTRAVENOUS PRN
Status: DISCONTINUED | OUTPATIENT
Start: 2024-01-01 | End: 2024-01-05 | Stop reason: HOSPADM

## 2024-01-01 RX ORDER — ENOXAPARIN SODIUM 100 MG/ML
40 INJECTION SUBCUTANEOUS DAILY
Status: DISCONTINUED | OUTPATIENT
Start: 2024-01-02 | End: 2024-01-04

## 2024-01-01 RX ORDER — SODIUM CHLORIDE 0.9 % (FLUSH) 0.9 %
5-40 SYRINGE (ML) INJECTION EVERY 12 HOURS SCHEDULED
Status: DISCONTINUED | OUTPATIENT
Start: 2024-01-01 | End: 2024-01-05 | Stop reason: HOSPADM

## 2024-01-01 RX ORDER — MESALAMINE 400 MG/1
400 CAPSULE, DELAYED RELEASE ORAL 3 TIMES DAILY
Status: DISCONTINUED | OUTPATIENT
Start: 2024-01-01 | End: 2024-01-05 | Stop reason: HOSPADM

## 2024-01-01 RX ORDER — PROCHLORPERAZINE EDISYLATE 5 MG/ML
10 INJECTION INTRAMUSCULAR; INTRAVENOUS EVERY 6 HOURS PRN
Status: DISCONTINUED | OUTPATIENT
Start: 2024-01-01 | End: 2024-01-05 | Stop reason: HOSPADM

## 2024-01-01 RX ORDER — AZITHROMYCIN 250 MG/1
500 TABLET, FILM COATED ORAL DAILY
Status: DISCONTINUED | OUTPATIENT
Start: 2024-01-01 | End: 2024-01-05 | Stop reason: HOSPADM

## 2024-01-01 RX ORDER — SODIUM CHLORIDE 0.9 % (FLUSH) 0.9 %
5-40 SYRINGE (ML) INJECTION PRN
Status: DISCONTINUED | OUTPATIENT
Start: 2024-01-01 | End: 2024-01-05 | Stop reason: HOSPADM

## 2024-01-01 RX ORDER — FUROSEMIDE 10 MG/ML
40 INJECTION INTRAMUSCULAR; INTRAVENOUS DAILY
Status: DISCONTINUED | OUTPATIENT
Start: 2024-01-02 | End: 2024-01-02

## 2024-01-01 RX ORDER — DEXTROSE MONOHYDRATE 100 MG/ML
INJECTION, SOLUTION INTRAVENOUS CONTINUOUS PRN
Status: DISCONTINUED | OUTPATIENT
Start: 2024-01-01 | End: 2024-01-05 | Stop reason: HOSPADM

## 2024-01-01 RX ORDER — GLUCAGON 1 MG/ML
1 KIT INJECTION PRN
Status: DISCONTINUED | OUTPATIENT
Start: 2024-01-01 | End: 2024-01-05 | Stop reason: HOSPADM

## 2024-01-01 RX ADMIN — DEXTROSE MONOHYDRATE: 100 INJECTION, SOLUTION INTRAVENOUS at 23:40

## 2024-01-01 RX ADMIN — Medication 10 ML: at 22:52

## 2024-01-01 RX ADMIN — DEXTROSE MONOHYDRATE 125 ML: 100 INJECTION, SOLUTION INTRAVENOUS at 19:45

## 2024-01-01 RX ADMIN — AZITHROMYCIN DIHYDRATE 500 MG: 250 TABLET ORAL at 22:58

## 2024-01-01 RX ADMIN — DEXTROSE MONOHYDRATE 125 ML: 100 INJECTION, SOLUTION INTRAVENOUS at 15:32

## 2024-01-01 RX ADMIN — Medication 5 MG: at 22:58

## 2024-01-01 RX ADMIN — POTASSIUM CHLORIDE 10 MEQ: 7.46 INJECTION, SOLUTION INTRAVENOUS at 22:33

## 2024-01-01 RX ADMIN — POTASSIUM CHLORIDE 10 MEQ: 7.46 INJECTION, SOLUTION INTRAVENOUS at 20:28

## 2024-01-01 RX ADMIN — WATER 1000 MG: 1 INJECTION INTRAMUSCULAR; INTRAVENOUS; SUBCUTANEOUS at 21:15

## 2024-01-01 RX ADMIN — METOPROLOL TARTRATE 12.5 MG: 25 TABLET, FILM COATED ORAL at 22:58

## 2024-01-01 RX ADMIN — MESALAMINE 400 MG: 400 CAPSULE, DELAYED RELEASE ORAL at 22:58

## 2024-01-01 RX ADMIN — IOPAMIDOL 75 ML: 755 INJECTION, SOLUTION INTRAVENOUS at 17:29

## 2024-01-01 ASSESSMENT — PAIN - FUNCTIONAL ASSESSMENT: PAIN_FUNCTIONAL_ASSESSMENT: 0-10

## 2024-01-01 NOTE — ED PROVIDER NOTES
Name: Chetna Lundy    MRN: [id number]     Date / Time Roomed:  1/1/2024  2:28 PM  ED Bed Assignment:  06/06    ------------------ History of Present Illness --------------------  1/1/24, Time: 3:04 PM EST   Chief Complaint   Patient presents with    Fall    Altered Mental Status      HPI    Chetna Lundy is a 89 y.o. female, with hx of GERD, diabetes, hearing impairment, hyperlipidemia, hypertension, bowel obstruction with surgery last year, thyroid disease, on eloquis, who presents to the ED today for altered mental status and concern for a fall last night.  Patient here with daughter who is at bedside.  Patient is from nursing care facility.  Daughter answering questions as patient is altered.  Daughter states that over the weekend she has not been herself, eating less, little appetite, seemed confused at times.  She states last night patient was found on the floor by her bed and nursing staff and helped her back up to bed and today she did seem more altered and less responsive therefore was sent to the ED for evaluation.  Patient is alert however not oriented to questioning.  Review of systems felt unreliable.  Primary assessment reassuring, vitals stable at this time.  Daughter stated that patient has been at the nursing care facility over the past year since having her bowel surgery and daughter states that she has seemed to be declining mentally and physically over the past year.    PCP: Shaggy Jean MD.    -------------------- [facility] --------------------    Past Medical History:   has a past medical history of Colitis, Diabetes mellitus (HCC), GERD (gastroesophageal reflux disease), Hearing impaired, Hyperlipidemia, Hypertension, Small bowel obstruction (HCC), and Thyroid disease.     Surgical History:  Past Surgical History:   Procedure Laterality Date    APPENDECTOMY      CATARACT REMOVAL WITH IMPLANT Right 04/15/2014    CATARACT REMOVAL WITH IMPLANT Left 11/15/2016    COLECTOMY N/A 2/18/2023

## 2024-01-02 PROBLEM — R41.82 ALTERED MENTAL STATUS: Status: ACTIVE | Noted: 2024-01-02

## 2024-01-02 LAB
ALBUMIN SERPL-MCNC: 2.6 G/DL (ref 3.5–5.2)
ALP SERPL-CCNC: 62 U/L (ref 35–104)
ALT SERPL-CCNC: 11 U/L (ref 0–32)
ANION GAP SERPL CALCULATED.3IONS-SCNC: 7 MMOL/L (ref 7–16)
AST SERPL-CCNC: 23 U/L (ref 0–31)
B PARAP IS1001 DNA NPH QL NAA+NON-PROBE: NOT DETECTED
B PERT DNA SPEC QL NAA+PROBE: NOT DETECTED
BASOPHILS # BLD: 0.03 K/UL (ref 0–0.2)
BASOPHILS NFR BLD: 1 % (ref 0–2)
BILIRUB SERPL-MCNC: 0.3 MG/DL (ref 0–1.2)
BUN SERPL-MCNC: 15 MG/DL (ref 6–23)
C PNEUM DNA NPH QL NAA+NON-PROBE: NOT DETECTED
CALCIUM SERPL-MCNC: 7.1 MG/DL (ref 8.6–10.2)
CHLORIDE SERPL-SCNC: 106 MMOL/L (ref 98–107)
CHOLEST SERPL-MCNC: 96 MG/DL
CHP ED QC CHECK: NORMAL
CO2 SERPL-SCNC: 30 MMOL/L (ref 22–29)
CREAT SERPL-MCNC: 0.6 MG/DL (ref 0.5–1)
EKG ATRIAL RATE: 55 BPM
EKG Q-T INTERVAL: 546 MS
EKG QRS DURATION: 160 MS
EKG QTC CALCULATION (BAZETT): 497 MS
EKG R AXIS: -74 DEGREES
EKG T AXIS: 80 DEGREES
EKG VENTRICULAR RATE: 50 BPM
EOSINOPHIL # BLD: 0.01 K/UL (ref 0.05–0.5)
EOSINOPHILS RELATIVE PERCENT: 0 % (ref 0–6)
ERYTHROCYTE [DISTWIDTH] IN BLOOD BY AUTOMATED COUNT: 14.3 % (ref 11.5–15)
FLUAV RNA NPH QL NAA+NON-PROBE: NOT DETECTED
FLUBV RNA NPH QL NAA+NON-PROBE: NOT DETECTED
GFR SERPL CREATININE-BSD FRML MDRD: >60 ML/MIN/1.73M2
GLUCOSE BLD-MCNC: 101 MG/DL
GLUCOSE BLD-MCNC: 101 MG/DL (ref 74–99)
GLUCOSE BLD-MCNC: 116 MG/DL
GLUCOSE BLD-MCNC: 116 MG/DL (ref 74–99)
GLUCOSE BLD-MCNC: 143 MG/DL (ref 74–99)
GLUCOSE BLD-MCNC: 157 MG/DL (ref 74–99)
GLUCOSE BLD-MCNC: 78 MG/DL
GLUCOSE BLD-MCNC: 78 MG/DL (ref 74–99)
GLUCOSE BLD-MCNC: 79 MG/DL (ref 74–99)
GLUCOSE BLD-MCNC: 81 MG/DL
GLUCOSE BLD-MCNC: 81 MG/DL (ref 74–99)
GLUCOSE SERPL-MCNC: 78 MG/DL (ref 74–99)
HADV DNA NPH QL NAA+NON-PROBE: NOT DETECTED
HBA1C MFR BLD: 7.9 % (ref 4–5.6)
HCOV 229E RNA NPH QL NAA+NON-PROBE: NOT DETECTED
HCOV HKU1 RNA NPH QL NAA+NON-PROBE: NOT DETECTED
HCOV NL63 RNA NPH QL NAA+NON-PROBE: NOT DETECTED
HCOV OC43 RNA NPH QL NAA+NON-PROBE: NOT DETECTED
HCT VFR BLD AUTO: 36.5 % (ref 34–48)
HDLC SERPL-MCNC: 37 MG/DL
HGB BLD-MCNC: 11.6 G/DL (ref 11.5–15.5)
HMPV RNA NPH QL NAA+NON-PROBE: NOT DETECTED
HPIV1 RNA NPH QL NAA+NON-PROBE: NOT DETECTED
HPIV2 RNA NPH QL NAA+NON-PROBE: NOT DETECTED
HPIV3 RNA NPH QL NAA+NON-PROBE: NOT DETECTED
HPIV4 RNA NPH QL NAA+NON-PROBE: NOT DETECTED
IMM GRANULOCYTES # BLD AUTO: 0.26 K/UL (ref 0–0.58)
IMM GRANULOCYTES NFR BLD: 4 % (ref 0–5)
LDH SERPL-CCNC: 278 U/L (ref 135–214)
LDLC SERPL CALC-MCNC: 44 MG/DL
LYMPHOCYTES NFR BLD: 0.86 K/UL (ref 1.5–4)
LYMPHOCYTES RELATIVE PERCENT: 13 % (ref 20–42)
M PNEUMO DNA NPH QL NAA+NON-PROBE: NOT DETECTED
MAGNESIUM SERPL-MCNC: 1.6 MG/DL (ref 1.6–2.6)
MCH RBC QN AUTO: 36 PG (ref 26–35)
MCHC RBC AUTO-ENTMCNC: 31.8 G/DL (ref 32–34.5)
MCV RBC AUTO: 113.4 FL (ref 80–99.9)
MONOCYTES NFR BLD: 0.61 K/UL (ref 0.1–0.95)
MONOCYTES NFR BLD: 9 % (ref 2–12)
NEUTROPHILS NFR BLD: 73 % (ref 43–80)
NEUTS SEG NFR BLD: 4.84 K/UL (ref 1.8–7.3)
PHOSPHATE SERPL-MCNC: 3 MG/DL (ref 2.5–4.5)
PLATELET # BLD AUTO: 124 K/UL (ref 130–450)
PMV BLD AUTO: 13 FL (ref 7–12)
POTASSIUM SERPL-SCNC: 3.4 MMOL/L (ref 3.5–5)
PROCALCITONIN SERPL-MCNC: 0.07 NG/ML (ref 0–0.08)
PROCALCITONIN SERPL-MCNC: 0.07 NG/ML (ref 0–0.08)
PROT SERPL-MCNC: 4.6 G/DL (ref 6.4–8.3)
RBC # BLD AUTO: 3.22 M/UL (ref 3.5–5.5)
RBC # BLD: NORMAL 10*6/UL
RSV RNA NPH QL NAA+NON-PROBE: NOT DETECTED
RV+EV RNA NPH QL NAA+NON-PROBE: NOT DETECTED
SARS-COV-2 RNA NPH QL NAA+NON-PROBE: DETECTED
SODIUM SERPL-SCNC: 143 MMOL/L (ref 132–146)
SPECIMEN DESCRIPTION: ABNORMAL
TRIGL SERPL-MCNC: 77 MG/DL
TSH SERPL DL<=0.05 MIU/L-ACNC: 0.42 UIU/ML (ref 0.27–4.2)
VLDLC SERPL CALC-MCNC: 15 MG/DL
WBC OTHER # BLD: 6.6 K/UL (ref 4.5–11.5)

## 2024-01-02 PROCEDURE — 80061 LIPID PANEL: CPT

## 2024-01-02 PROCEDURE — 2060000000 HC ICU INTERMEDIATE R&B

## 2024-01-02 PROCEDURE — 85025 COMPLETE CBC W/AUTO DIFF WBC: CPT

## 2024-01-02 PROCEDURE — 6360000002 HC RX W HCPCS: Performed by: NURSE PRACTITIONER

## 2024-01-02 PROCEDURE — 84100 ASSAY OF PHOSPHORUS: CPT

## 2024-01-02 PROCEDURE — 93010 ELECTROCARDIOGRAM REPORT: CPT | Performed by: INTERNAL MEDICINE

## 2024-01-02 PROCEDURE — 6370000000 HC RX 637 (ALT 250 FOR IP): Performed by: INTERNAL MEDICINE

## 2024-01-02 PROCEDURE — P9047 ALBUMIN (HUMAN), 25%, 50ML: HCPCS | Performed by: NURSE PRACTITIONER

## 2024-01-02 PROCEDURE — 6360000002 HC RX W HCPCS: Performed by: INTERNAL MEDICINE

## 2024-01-02 PROCEDURE — 84443 ASSAY THYROID STIM HORMONE: CPT

## 2024-01-02 PROCEDURE — 99223 1ST HOSP IP/OBS HIGH 75: CPT | Performed by: INTERNAL MEDICINE

## 2024-01-02 PROCEDURE — 82962 GLUCOSE BLOOD TEST: CPT

## 2024-01-02 PROCEDURE — 2580000003 HC RX 258: Performed by: INTERNAL MEDICINE

## 2024-01-02 PROCEDURE — 83735 ASSAY OF MAGNESIUM: CPT

## 2024-01-02 PROCEDURE — 80053 COMPREHEN METABOLIC PANEL: CPT

## 2024-01-02 PROCEDURE — 83615 LACTATE (LD) (LDH) ENZYME: CPT

## 2024-01-02 RX ORDER — INSULIN LISPRO 100 [IU]/ML
0-12 INJECTION, SOLUTION INTRAVENOUS; SUBCUTANEOUS
Status: ON HOLD | COMMUNITY

## 2024-01-02 RX ORDER — ACETAMINOPHEN 325 MG/1
650 TABLET ORAL EVERY 6 HOURS PRN
Status: DISCONTINUED | OUTPATIENT
Start: 2024-01-02 | End: 2024-01-05 | Stop reason: HOSPADM

## 2024-01-02 RX ORDER — ZINC SULFATE 50(220)MG
50 CAPSULE ORAL DAILY
Status: DISCONTINUED | OUTPATIENT
Start: 2024-01-02 | End: 2024-01-05 | Stop reason: HOSPADM

## 2024-01-02 RX ORDER — TRAMADOL HYDROCHLORIDE 50 MG/1
25 TABLET ORAL DAILY PRN
Status: ON HOLD | COMMUNITY

## 2024-01-02 RX ORDER — VITAMIN B COMPLEX
2000 TABLET ORAL DAILY
Status: DISCONTINUED | OUTPATIENT
Start: 2024-01-02 | End: 2024-01-05 | Stop reason: HOSPADM

## 2024-01-02 RX ORDER — PETROLATUM,WHITE/LANOLIN
1 OINTMENT (GRAM) TOPICAL PRN
Status: ON HOLD | COMMUNITY

## 2024-01-02 RX ORDER — CHOLECALCIFEROL (VITAMIN D3) 50 MCG
2000 TABLET ORAL DAILY
Status: ON HOLD | COMMUNITY

## 2024-01-02 RX ORDER — FUROSEMIDE 10 MG/ML
20 INJECTION INTRAMUSCULAR; INTRAVENOUS 2 TIMES DAILY
Status: COMPLETED | OUTPATIENT
Start: 2024-01-02 | End: 2024-01-03

## 2024-01-02 RX ORDER — ASCORBIC ACID 500 MG
1000 TABLET ORAL DAILY
Status: DISCONTINUED | OUTPATIENT
Start: 2024-01-02 | End: 2024-01-05 | Stop reason: HOSPADM

## 2024-01-02 RX ORDER — ACETAMINOPHEN 325 MG/1
650 TABLET ORAL EVERY 4 HOURS PRN
Status: ON HOLD | COMMUNITY

## 2024-01-02 RX ORDER — ALBUMIN (HUMAN) 12.5 G/50ML
25 SOLUTION INTRAVENOUS 2 TIMES DAILY
Status: COMPLETED | OUTPATIENT
Start: 2024-01-02 | End: 2024-01-03

## 2024-01-02 RX ORDER — PETROLATUM,WHITE/LANOLIN
1 OINTMENT (GRAM) TOPICAL 2 TIMES DAILY
Status: ON HOLD | COMMUNITY

## 2024-01-02 RX ORDER — POTASSIUM CHLORIDE 20 MEQ/1
20 TABLET, EXTENDED RELEASE ORAL DAILY
Status: ON HOLD | COMMUNITY

## 2024-01-02 RX ORDER — BISACODYL 10 MG
10 SUPPOSITORY, RECTAL RECTAL DAILY PRN
Status: ON HOLD | COMMUNITY

## 2024-01-02 RX ORDER — DEXAMETHASONE SODIUM PHOSPHATE 10 MG/ML
6 INJECTION INTRAMUSCULAR; INTRAVENOUS EVERY 24 HOURS
Status: DISCONTINUED | OUTPATIENT
Start: 2024-01-02 | End: 2024-01-05 | Stop reason: HOSPADM

## 2024-01-02 RX ADMIN — ACETAMINOPHEN 650 MG: 325 TABLET ORAL at 20:40

## 2024-01-02 RX ADMIN — Medication 2000 UNITS: at 17:18

## 2024-01-02 RX ADMIN — FUROSEMIDE 20 MG: 10 INJECTION, SOLUTION INTRAMUSCULAR; INTRAVENOUS at 08:09

## 2024-01-02 RX ADMIN — AZITHROMYCIN DIHYDRATE 500 MG: 250 TABLET ORAL at 08:32

## 2024-01-02 RX ADMIN — MESALAMINE 400 MG: 400 CAPSULE, DELAYED RELEASE ORAL at 08:31

## 2024-01-02 RX ADMIN — ZINC SULFATE 220 MG (50 MG) CAPSULE 50 MG: CAPSULE at 17:18

## 2024-01-02 RX ADMIN — MESALAMINE 400 MG: 400 CAPSULE, DELAYED RELEASE ORAL at 21:43

## 2024-01-02 RX ADMIN — Medication 10 ML: at 20:40

## 2024-01-02 RX ADMIN — ENOXAPARIN SODIUM 40 MG: 100 INJECTION SUBCUTANEOUS at 08:32

## 2024-01-02 RX ADMIN — ALBUMIN (HUMAN) 25 G: 0.25 INJECTION, SOLUTION INTRAVENOUS at 18:20

## 2024-01-02 RX ADMIN — DEXAMETHASONE SODIUM PHOSPHATE 6 MG: 10 INJECTION INTRAMUSCULAR; INTRAVENOUS at 17:19

## 2024-01-02 RX ADMIN — OXYCODONE HYDROCHLORIDE AND ACETAMINOPHEN 1000 MG: 500 TABLET ORAL at 17:18

## 2024-01-02 RX ADMIN — FUROSEMIDE 20 MG: 10 INJECTION, SOLUTION INTRAMUSCULAR; INTRAVENOUS at 18:17

## 2024-01-02 RX ADMIN — PANTOPRAZOLE SODIUM 40 MG: 40 TABLET, DELAYED RELEASE ORAL at 08:08

## 2024-01-02 RX ADMIN — ALBUMIN (HUMAN) 25 G: 0.25 INJECTION, SOLUTION INTRAVENOUS at 08:18

## 2024-01-02 RX ADMIN — Medication 5 MG: at 20:40

## 2024-01-02 RX ADMIN — ATORVASTATIN CALCIUM 20 MG: 20 TABLET, FILM COATED ORAL at 08:09

## 2024-01-02 RX ADMIN — LISINOPRIL 20 MG: 10 TABLET ORAL at 08:08

## 2024-01-02 RX ADMIN — METOPROLOL TARTRATE 12.5 MG: 25 TABLET, FILM COATED ORAL at 08:07

## 2024-01-02 RX ADMIN — Medication 10 ML: at 08:10

## 2024-01-02 RX ADMIN — WATER 1000 MG: 1 INJECTION INTRAMUSCULAR; INTRAVENOUS; SUBCUTANEOUS at 20:49

## 2024-01-02 RX ADMIN — MESALAMINE 400 MG: 400 CAPSULE, DELAYED RELEASE ORAL at 14:21

## 2024-01-02 ASSESSMENT — PAIN DESCRIPTION - ORIENTATION: ORIENTATION: LEFT

## 2024-01-02 ASSESSMENT — PAIN SCALES - WONG BAKER: WONGBAKER_NUMERICALRESPONSE: 4

## 2024-01-02 ASSESSMENT — PAIN DESCRIPTION - DESCRIPTORS: DESCRIPTORS: ACHING;SORE

## 2024-01-02 ASSESSMENT — PAIN DESCRIPTION - LOCATION: LOCATION: LEG;ARM

## 2024-01-02 ASSESSMENT — PAIN SCALES - GENERAL: PAINLEVEL_OUTOF10: 4

## 2024-01-02 NOTE — CONSULTS
Assessment and Plan  Patient is a 89 y.o. female with the following medical Problems:   Acute respiratory failure with hypoxia acutely decompensated diastolic congestive heart failure with associated large bilateral pleural effusions  Urinary tract infection, recurrent  Acute metabolic encephalopathy vs delirium with previously undiagnosed vascular dementia?  (Extensive vascular disease related changes, atrophy and prior strokes on admission CT)  Atrial fibrillation on chronic Eliquis  CKD stage II  Sick sinus syndrome status post pacemaker placement  Non-insulin-dependent diabetes mellitus type 2 with hypoglycemia on arrival  Hypertensive urgency with essential hypertension  Hyperlipidemia  Hypothyroidism    Plan of care:  Continue with ceftriaxone and azithromycin for 5 days.  We will proceed with right-sided thoracentesis to facilitate weaning of supplemental oxygen.  Hold Eliquis.  Patient is currently on Lovenox for DVT prophylaxis.  Will proceed with bilateral thoracentesis in 2 consecutive days.  Legionella urinary antigen and strep urinary antigen.  Patient is not a candidate for advanced therapy for COVID-19 since her symptoms are most likely related to congestive heart failure of her COVID-19.  Will continue to monitor her progress.  Judicious diuresis with close monitoring of kidney function  Protonix for GI prophylaxis  Speech therapy evaluation and PT OT    History of Present Illness:   Patient is a 89-year-old pleasant woman who was admitted on January 2, 2023 with progressive shortness of breath.  CT scan showed worsening bilateral pleural effusions compared to a CT scan of the chest from February 17, 2023.  Patient has been on supplemental oxygen however she is a poor candidate for advanced therapy for COVID-19 due to her age and the likelihood that her symptoms are mostly related to heart failure.       Past Medical History:  Past Medical History:   Diagnosis Date    Colitis     Diabetes mellitus

## 2024-01-02 NOTE — PROGRESS NOTES
Pharmacy consulted for the benefit of covid therapy    Patient currently on 5 L/min NC supplemental oxygen. On 1-4 L/min at baseline per NH MAR. LDH mildly elevated.    Ordered CRP tomorrow morning. If elevated, may consider baricitinib therapy      Suresh Thakur, PharmD 1/2/2024 7:00 PM   [phone]

## 2024-01-02 NOTE — H&P
PENDING 01/02/2024 06:02 AM     BMP:    Lab Results   Component Value Date/Time     01/01/2024 03:38 PM    K 3.2 01/01/2024 03:38 PM    K 3.9 06/27/2023 07:20 PM    CL 96 01/01/2024 03:38 PM    CO2 33 01/01/2024 03:38 PM    BUN 22 01/01/2024 03:38 PM    LABALBU 3.3 01/01/2024 03:38 PM    CREATININE 0.8 01/01/2024 03:38 PM    CALCIUM 9.0 01/01/2024 03:38 PM    GFRAA >60 09/27/2018 02:01 PM    LABGLOM >60 01/01/2024 03:38 PM    GLUCOSE 78 01/02/2024 06:12 AM     Recent Labs     01/01/24  1930   TROPHS 29*     ASSESSMENT:  Acute respiratory failure with hypoxia acutely decompensated diastolic congestive heart failure with associated large bilateral pleural effusions  Urinary tract infection, recurrent  Acute metabolic encephalopathy vs delirium with previously undiagnosed vascular dementia?  (Extensive vascular disease related changes, atrophy and prior strokes on admission CT)  Atrial fibrillation on chronic Eliquis  CKD stage IIIb  Sick sinus syndrome status post pacemaker placement  Non-insulin-dependent diabetes mellitus type 2 with hypoglycemia on arrival  Hypertensive urgency with essential hypertension  Hyperlipidemia  Hypothyroidism    PLAN:  Chetna Lundy is a 89-year-old female patient who presented to [facility] for evaluation of increased shortness of breath.  ER workup revealed decompensated CHF with pleural effusions.  Diuresis is being undertaken and we will prepare for thoracentesis and fluid analysis.  Echocardiogram will be assessed.  Cardiac medical optimization is being undertaken.  Also identified is urinary tract infection with history of recurrent UTIs.  IV antibiotics have been implemented while blood and urine cultures are pending.  Anticipate de-escalation depending upon result.  Encephalopathy is identified with confusion above baseline.  Patient's daughter denies any known history of dementia however she has extensive vascular disease related changes, atrophy and prior strokes on CT  head suggesting the likelihood of underlying vascular dementia.  We will assess for clearing of sensorium with treatment of the above-mentioned processes.  Patient's Eliquis has been placed on hold as we planned for thoracentesis.  It can be resumed once the procedures have been completed.  With the bilateral nature of the pleural effusions however it may need help for multiple days.  Lovenox will be utilized in its stead.  Patient was hypoglycemic on admission, diabetic medications are being held and sliding scale insulin will be utilized.  Due to known history of oropharyngeal dysphagia the patient has been placed on her baseline modified diet of minced and moist consistency solids and honey thickened liquids.  I have reviewed the patient's transfer form and DNR paperwork and have continued her DNR CCA order from the skilled nursing facility residence.  PT, OT, speech and social work will follow for discharge planning.  Underlying co-morbidites will be addressed during hospitalization as well. Labs and vital signs will be monitored closely and addressed accordingly. See additional orders for details.     Due to high hospital inpatient census and bed limitations, along with staff shortages, we have had a jose discussion with the patient/family regarding the likelihood of prolonged ER boarding status and potential delays/disruptions in care related to this.  They understand and agree to maintain hospitalization at this facility while accepting these risks.  We have made every attempt to coordinate care with the ER nursing staff as well to avoid any disruptions in care.    Due to high incidence and prevalence of highly contagious viruses and other infections at this facility, hospital inpatient census and bed limitations, we have had a jose discussion with the patient/family regarding the concern for development of nosocomial infection while hospitalized.  Development of nosocomial infections could result in  negative...

## 2024-01-02 NOTE — ED NOTES
Potassium slowed down to 50 mL/hour and fluids started. Pt saying \"ouch\" after potassium started.

## 2024-01-03 ENCOUNTER — APPOINTMENT (OUTPATIENT)
Dept: INTERVENTIONAL RADIOLOGY/VASCULAR | Age: 89
DRG: 177 | End: 2024-01-03
Payer: MEDICARE

## 2024-01-03 ENCOUNTER — APPOINTMENT (OUTPATIENT)
Dept: GENERAL RADIOLOGY | Age: 89
DRG: 177 | End: 2024-01-03
Payer: MEDICARE

## 2024-01-03 ENCOUNTER — APPOINTMENT (OUTPATIENT)
Age: 89
DRG: 177 | End: 2024-01-03
Attending: INTERNAL MEDICINE
Payer: MEDICARE

## 2024-01-03 LAB
ALBUMIN SERPL-MCNC: 3.8 G/DL (ref 3.5–5.2)
ALP SERPL-CCNC: 90 U/L (ref 35–104)
ALT SERPL-CCNC: 17 U/L (ref 0–32)
ANION GAP SERPL CALCULATED.3IONS-SCNC: 20 MMOL/L (ref 7–16)
APPEARANCE FLD: CLEAR
AST SERPL-CCNC: 25 U/L (ref 0–31)
BASOPHILS # BLD: 0.01 K/UL (ref 0–0.2)
BASOPHILS NFR BLD: 0 % (ref 0–2)
BILIRUB SERPL-MCNC: 0.4 MG/DL (ref 0–1.2)
BODY FLD TYPE: NORMAL
BUN SERPL-MCNC: 24 MG/DL (ref 6–23)
CALCIUM SERPL-MCNC: 9.1 MG/DL (ref 8.6–10.2)
CHLORIDE SERPL-SCNC: 97 MMOL/L (ref 98–107)
CLOT CHECK: NORMAL
CO2 SERPL-SCNC: 24 MMOL/L (ref 22–29)
COLOR FLD: YELLOW
CREAT SERPL-MCNC: 0.9 MG/DL (ref 0.5–1)
CRITICAL: ABNORMAL
CRP SERPL HS-MCNC: 8 MG/L (ref 0–5)
DATE ANALYZED: ABNORMAL
DATE OF COLLECTION: ABNORMAL
ECHO AV CUSP MM: 1.7 CM
ECHO AV MEAN GRADIENT: 3 MMHG
ECHO AV MEAN VELOCITY: 0.8 M/S
ECHO AV PEAK GRADIENT: 8 MMHG
ECHO AV PEAK VELOCITY: 1.5 M/S
ECHO AV VELOCITY RATIO: 0.53
ECHO AV VTI: 31.1 CM
ECHO BSA: 1.59 M2
ECHO EST RA PRESSURE: 15 MMHG
ECHO LA DIAMETER INDEX: 2.69 CM/M2
ECHO LA DIAMETER: 4.3 CM
ECHO LV FRACTIONAL SHORTENING: 37 % (ref 28–44)
ECHO LV INTERNAL DIMENSION DIASTOLE INDEX: 2.38 CM/M2
ECHO LV INTERNAL DIMENSION DIASTOLIC: 3.8 CM (ref 3.9–5.3)
ECHO LV INTERNAL DIMENSION SYSTOLIC INDEX: 1.5 CM/M2
ECHO LV INTERNAL DIMENSION SYSTOLIC: 2.4 CM
ECHO LV IVSD: 1 CM (ref 0.6–0.9)
ECHO LV IVSS: 1.6 CM
ECHO LV MASS 2D: 125.8 G (ref 67–162)
ECHO LV MASS INDEX 2D: 78.6 G/M2 (ref 43–95)
ECHO LV POSTERIOR WALL DIASTOLIC: 1.1 CM (ref 0.6–0.9)
ECHO LV POSTERIOR WALL SYSTOLIC: 1.5 CM
ECHO LV RELATIVE WALL THICKNESS RATIO: 0.58
ECHO LVOT AV VTI INDEX: 0.61
ECHO LVOT MEAN GRADIENT: 1 MMHG
ECHO LVOT PEAK GRADIENT: 3 MMHG
ECHO LVOT PEAK VELOCITY: 0.8 M/S
ECHO LVOT VTI: 18.9 CM
ECHO MV A VELOCITY: 0.01 M/S
ECHO MV AREA PHT: 2.4 CM2
ECHO MV E DECELERATION TIME (DT): 118.5 MS
ECHO MV E VELOCITY: 1.06 M/S
ECHO MV E/A RATIO: 106
ECHO MV LVOT VTI INDEX: 1.99
ECHO MV MAX VELOCITY: 1.3 M/S
ECHO MV MEAN GRADIENT: 2 MMHG
ECHO MV MEAN VELOCITY: 0.5 M/S
ECHO MV PEAK GRADIENT: 7 MMHG
ECHO MV PRESSURE HALF TIME (PHT): 92.5 MS
ECHO MV VTI: 37.7 CM
ECHO PULMONARY ARTERY SYSTOLIC PRESSURE (PASP): 75 MMHG
ECHO PV MAX VELOCITY: 0.9 M/S
ECHO PV MEAN GRADIENT: 1 MMHG
ECHO PV MEAN VELOCITY: 0.5 M/S
ECHO PV PEAK GRADIENT: 3 MMHG
ECHO PV VTI: 18.3 CM
ECHO RIGHT VENTRICULAR SYSTOLIC PRESSURE (RVSP): 75 MMHG
ECHO RV INTERNAL DIMENSION: 2.5 CM
ECHO TV REGURGITANT MAX VELOCITY: 3.86 M/S
ECHO TV REGURGITANT PEAK GRADIENT: 60 MMHG
EOSINOPHIL # BLD: 0 K/UL (ref 0.05–0.5)
EOSINOPHILS RELATIVE PERCENT: 0 % (ref 0–6)
ERYTHROCYTE [DISTWIDTH] IN BLOOD BY AUTOMATED COUNT: 14.4 % (ref 11.5–15)
GFR SERPL CREATININE-BSD FRML MDRD: 58 ML/MIN/1.73M2
GLUCOSE BLD-MCNC: 172 MG/DL (ref 74–99)
GLUCOSE BLD-MCNC: 264 MG/DL (ref 74–99)
GLUCOSE BLD-MCNC: 296 MG/DL (ref 74–99)
GLUCOSE BLD-MCNC: 307 MG/DL (ref 74–99)
GLUCOSE BLD-MCNC: 352 MG/DL (ref 74–99)
GLUCOSE FLD-MCNC: 232 MG/DL
GLUCOSE SERPL-MCNC: 165 MG/DL (ref 74–99)
HCT VFR BLD AUTO: 37.8 % (ref 34–48)
HGB BLD-MCNC: 11.6 G/DL (ref 11.5–15.5)
IMM GRANULOCYTES # BLD AUTO: 0.04 K/UL (ref 0–0.58)
IMM GRANULOCYTES NFR BLD: 1 % (ref 0–5)
L PNEUMO1 AG UR QL IA.RAPID: NEGATIVE
LAB: ABNORMAL
LDH FLD L TO P-CCNC: 69 U/L
LDH SERPL-CCNC: 274 U/L (ref 135–214)
LYMPHOCYTES NFR BLD: 0.45 K/UL (ref 1.5–4)
LYMPHOCYTES RELATIVE PERCENT: 8 % (ref 20–42)
Lab: 1445
Lab: ABNORMAL
MCH RBC QN AUTO: 35.7 PG (ref 26–35)
MCHC RBC AUTO-ENTMCNC: 30.7 G/DL (ref 32–34.5)
MCV RBC AUTO: 116.3 FL (ref 80–99.9)
MICROORGANISM SPEC CULT: NO GROWTH
MONOCYTES NFR BLD: 0.23 K/UL (ref 0.1–0.95)
MONOCYTES NFR BLD: 4 % (ref 2–12)
MONOCYTES NFR FLD: 84 %
NEUTROPHILS NFR BLD: 87 % (ref 43–80)
NEUTROPHILS NFR FLD: 16 %
NEUTS SEG NFR BLD: 4.99 K/UL (ref 1.8–7.3)
OPERATOR ID: ABNORMAL
PH FLUID: ABNORMAL
PLATELET, FLUORESCENCE: 139 K/UL (ref 130–450)
PMV BLD AUTO: 13.1 FL (ref 7–12)
POTASSIUM SERPL-SCNC: 4 MMOL/L (ref 3.5–5)
PROT FLD-MCNC: 2.5 G/DL
PROT SERPL-MCNC: 6.5 G/DL (ref 6.4–8.3)
RBC # BLD AUTO: 3.25 M/UL (ref 3.5–5.5)
RBC # FLD: <2000 CELLS/UL
S PNEUM AG SPEC QL: NEGATIVE
SODIUM SERPL-SCNC: 141 MMOL/L (ref 132–146)
SOURCE, BLOOD GAS: ABNORMAL
SPECIMEN DESCRIPTION: NORMAL
SPECIMEN SOURCE: NORMAL
SPECIMEN TYPE: NORMAL
TIME ANALYZED: 1456
WBC # FLD: 57 CELLS/UL
WBC OTHER # BLD: 5.7 K/UL (ref 4.5–11.5)

## 2024-01-03 PROCEDURE — 0W9930Z DRAINAGE OF RIGHT PLEURAL CAVITY WITH DRAINAGE DEVICE, PERCUTANEOUS APPROACH: ICD-10-PCS | Performed by: INTERNAL MEDICINE

## 2024-01-03 PROCEDURE — 82962 GLUCOSE BLOOD TEST: CPT

## 2024-01-03 PROCEDURE — 93306 TTE W/DOPPLER COMPLETE: CPT

## 2024-01-03 PROCEDURE — 6370000000 HC RX 637 (ALT 250 FOR IP): Performed by: INTERNAL MEDICINE

## 2024-01-03 PROCEDURE — 92610 EVALUATE SWALLOWING FUNCTION: CPT | Performed by: SPEECH-LANGUAGE PATHOLOGIST

## 2024-01-03 PROCEDURE — 32555 ASPIRATE PLEURA W/ IMAGING: CPT

## 2024-01-03 PROCEDURE — 32557 INSERT CATH PLEURA W/ IMAGE: CPT

## 2024-01-03 PROCEDURE — 85025 COMPLETE CBC W/AUTO DIFF WBC: CPT

## 2024-01-03 PROCEDURE — 97110 THERAPEUTIC EXERCISES: CPT | Performed by: PHYSICAL THERAPIST

## 2024-01-03 PROCEDURE — 36415 COLL VENOUS BLD VENIPUNCTURE: CPT

## 2024-01-03 PROCEDURE — C1729 CATH, DRAINAGE: HCPCS

## 2024-01-03 PROCEDURE — 82945 GLUCOSE OTHER FLUID: CPT

## 2024-01-03 PROCEDURE — 93306 TTE W/DOPPLER COMPLETE: CPT | Performed by: INTERNAL MEDICINE

## 2024-01-03 PROCEDURE — 87070 CULTURE OTHR SPECIMN AEROBIC: CPT

## 2024-01-03 PROCEDURE — 83986 ASSAY PH BODY FLUID NOS: CPT

## 2024-01-03 PROCEDURE — 97535 SELF CARE MNGMENT TRAINING: CPT

## 2024-01-03 PROCEDURE — 84157 ASSAY OF PROTEIN OTHER: CPT

## 2024-01-03 PROCEDURE — 97165 OT EVAL LOW COMPLEX 30 MIN: CPT

## 2024-01-03 PROCEDURE — 97530 THERAPEUTIC ACTIVITIES: CPT | Performed by: PHYSICAL THERAPIST

## 2024-01-03 PROCEDURE — 97161 PT EVAL LOW COMPLEX 20 MIN: CPT | Performed by: PHYSICAL THERAPIST

## 2024-01-03 PROCEDURE — 71045 X-RAY EXAM CHEST 1 VIEW: CPT

## 2024-01-03 PROCEDURE — 86140 C-REACTIVE PROTEIN: CPT

## 2024-01-03 PROCEDURE — P9047 ALBUMIN (HUMAN), 25%, 50ML: HCPCS | Performed by: NURSE PRACTITIONER

## 2024-01-03 PROCEDURE — 0W993ZZ DRAINAGE OF RIGHT PLEURAL CAVITY, PERCUTANEOUS APPROACH: ICD-10-PCS | Performed by: INTERNAL MEDICINE

## 2024-01-03 PROCEDURE — 87116 MYCOBACTERIA CULTURE: CPT

## 2024-01-03 PROCEDURE — 88112 CYTOPATH CELL ENHANCE TECH: CPT

## 2024-01-03 PROCEDURE — 88305 TISSUE EXAM BY PATHOLOGIST: CPT

## 2024-01-03 PROCEDURE — 87015 SPECIMEN INFECT AGNT CONCNTJ: CPT

## 2024-01-03 PROCEDURE — 6360000002 HC RX W HCPCS: Performed by: INTERNAL MEDICINE

## 2024-01-03 PROCEDURE — 87206 SMEAR FLUORESCENT/ACID STAI: CPT

## 2024-01-03 PROCEDURE — 83615 LACTATE (LD) (LDH) ENZYME: CPT

## 2024-01-03 PROCEDURE — 2580000003 HC RX 258: Performed by: INTERNAL MEDICINE

## 2024-01-03 PROCEDURE — 80053 COMPREHEN METABOLIC PANEL: CPT

## 2024-01-03 PROCEDURE — 89051 BODY FLUID CELL COUNT: CPT

## 2024-01-03 PROCEDURE — C1769 GUIDE WIRE: HCPCS

## 2024-01-03 PROCEDURE — 87205 SMEAR GRAM STAIN: CPT

## 2024-01-03 PROCEDURE — 99233 SBSQ HOSP IP/OBS HIGH 50: CPT | Performed by: INTERNAL MEDICINE

## 2024-01-03 PROCEDURE — 2060000000 HC ICU INTERMEDIATE R&B

## 2024-01-03 PROCEDURE — 6360000002 HC RX W HCPCS: Performed by: NURSE PRACTITIONER

## 2024-01-03 RX ORDER — SODIUM CHLORIDE 9 MG/ML
INJECTION, SOLUTION INTRAVENOUS PRN
Status: DISCONTINUED | OUTPATIENT
Start: 2024-01-03 | End: 2024-01-05 | Stop reason: HOSPADM

## 2024-01-03 RX ORDER — SODIUM CHLORIDE 0.9 % (FLUSH) 0.9 %
5-40 SYRINGE (ML) INJECTION PRN
Status: DISCONTINUED | OUTPATIENT
Start: 2024-01-03 | End: 2024-01-05 | Stop reason: HOSPADM

## 2024-01-03 RX ORDER — SODIUM CHLORIDE 0.9 % (FLUSH) 0.9 %
5-40 SYRINGE (ML) INJECTION EVERY 12 HOURS SCHEDULED
Status: DISCONTINUED | OUTPATIENT
Start: 2024-01-03 | End: 2024-01-05 | Stop reason: HOSPADM

## 2024-01-03 RX ADMIN — Medication 10 ML: at 11:17

## 2024-01-03 RX ADMIN — ZINC SULFATE 220 MG (50 MG) CAPSULE 50 MG: CAPSULE at 10:19

## 2024-01-03 RX ADMIN — ATORVASTATIN CALCIUM 20 MG: 20 TABLET, FILM COATED ORAL at 10:20

## 2024-01-03 RX ADMIN — INSULIN LISPRO 2 UNITS: 100 INJECTION, SOLUTION INTRAVENOUS; SUBCUTANEOUS at 14:03

## 2024-01-03 RX ADMIN — ALBUMIN (HUMAN) 25 G: 0.25 INJECTION, SOLUTION INTRAVENOUS at 19:27

## 2024-01-03 RX ADMIN — AZITHROMYCIN DIHYDRATE 500 MG: 250 TABLET ORAL at 10:20

## 2024-01-03 RX ADMIN — Medication 5 MG: at 22:50

## 2024-01-03 RX ADMIN — PANTOPRAZOLE SODIUM 40 MG: 40 TABLET, DELAYED RELEASE ORAL at 05:47

## 2024-01-03 RX ADMIN — ACETAMINOPHEN 650 MG: 325 TABLET ORAL at 22:50

## 2024-01-03 RX ADMIN — INSULIN LISPRO 3 UNITS: 100 INJECTION, SOLUTION INTRAVENOUS; SUBCUTANEOUS at 17:55

## 2024-01-03 RX ADMIN — FUROSEMIDE 20 MG: 10 INJECTION, SOLUTION INTRAMUSCULAR; INTRAVENOUS at 19:24

## 2024-01-03 RX ADMIN — DEXAMETHASONE SODIUM PHOSPHATE 6 MG: 10 INJECTION INTRAMUSCULAR; INTRAVENOUS at 14:03

## 2024-01-03 RX ADMIN — Medication 10 ML: at 10:21

## 2024-01-03 RX ADMIN — WATER 1000 MG: 1 INJECTION INTRAMUSCULAR; INTRAVENOUS; SUBCUTANEOUS at 22:50

## 2024-01-03 RX ADMIN — MESALAMINE 400 MG: 400 CAPSULE, DELAYED RELEASE ORAL at 11:30

## 2024-01-03 RX ADMIN — MESALAMINE 400 MG: 400 CAPSULE, DELAYED RELEASE ORAL at 14:04

## 2024-01-03 RX ADMIN — Medication 10 ML: at 22:52

## 2024-01-03 RX ADMIN — FUROSEMIDE 20 MG: 10 INJECTION, SOLUTION INTRAMUSCULAR; INTRAVENOUS at 10:18

## 2024-01-03 RX ADMIN — ALBUMIN (HUMAN) 25 G: 0.25 INJECTION, SOLUTION INTRAVENOUS at 10:36

## 2024-01-03 RX ADMIN — Medication 2000 UNITS: at 10:20

## 2024-01-03 RX ADMIN — OXYCODONE HYDROCHLORIDE AND ACETAMINOPHEN 1000 MG: 500 TABLET ORAL at 10:20

## 2024-01-03 RX ADMIN — MESALAMINE 400 MG: 400 CAPSULE, DELAYED RELEASE ORAL at 22:50

## 2024-01-03 ASSESSMENT — PAIN DESCRIPTION - LOCATION: LOCATION: GENERALIZED

## 2024-01-03 ASSESSMENT — PAIN DESCRIPTION - DESCRIPTORS: DESCRIPTORS: ACHING

## 2024-01-03 ASSESSMENT — PAIN SCALES - GENERAL: PAINLEVEL_OUTOF10: 5

## 2024-01-03 NOTE — PROGRESS NOTES
ulcers. No cyanosis. No clubbing.  Neurologic:    Alert x 3.  Generally weak without focal deficit.  Very hard of hearing at baseline, otherwise cranial nerves grossly intact. No focal weakness.  Psych:   Behavior is normal. Mood appears blunt. States that she is \"ready for this to be over\" and is referring to be ready for death. Patient's daughter states that she has been saying this more frequently. Speech is not rapid and/or pressured.  Musculoskeletal:   No unilateral joint edema, erythema, or warmth. Gait not assessed.  Integumentary:  No rashes  Skin normal color and texture.  Genitalia/Breast:  Deferred    Medication:  Scheduled Meds:   sodium chloride flush  5-40 mL IntraVENous 2 times per day    furosemide  20 mg IntraVENous BID    albumin human 25%  25 g IntraVENous BID    dexAMETHasone  6 mg IntraVENous Q24H    zinc sulfate  50 mg Oral Daily    Vitamin D  2,000 Units Oral Daily    ascorbic acid  1,000 mg Oral Daily    lisinopril  20 mg Oral Daily    melatonin  5 mg Oral QHS    metoprolol tartrate  12.5 mg Oral BID    pantoprazole  40 mg Oral QAM AC    sodium chloride flush  5-40 mL IntraVENous 2 times per day    cefTRIAXone (ROCEPHIN) IV  1,000 mg IntraVENous Q24H    azithromycin  500 mg Oral Daily    [Held by provider] enoxaparin  40 mg SubCUTAneous Daily    insulin lispro  0-4 Units SubCUTAneous TID WC    insulin lispro  0-4 Units SubCUTAneous Nightly    atorvastatin  20 mg Oral Daily    mesalamine  400 mg Oral TID     Continuous Infusions:   sodium chloride      sodium chloride      dextrose Stopped (01/02/24 0136)       Objective Data:  CBC with Differential:    Lab Results   Component Value Date/Time    WBC 5.7 01/03/2024 07:05 AM    RBC 3.25 01/03/2024 07:05 AM    HGB 11.6 01/03/2024 07:05 AM    HCT 37.8 01/03/2024 07:05 AM     01/02/2024 06:02 AM    .3 01/03/2024 07:05 AM    MCH 35.7 01/03/2024 07:05 AM    MCHC 30.7 01/03/2024 07:05 AM    RDW 14.4 01/03/2024 07:05 AM    METASPCT 1.7  02/18/2023 07:32 AM    LYMPHOPCT 8 01/03/2024 07:05 AM    MONOPCT 4 01/03/2024 07:05 AM    MYELOPCT 0.9 07/03/2023 08:51 AM    BASOPCT 0 01/03/2024 07:05 AM    MONOSABS 0.23 01/03/2024 07:05 AM    LYMPHSABS 0.45 01/03/2024 07:05 AM    EOSABS 0.00 01/03/2024 07:05 AM    BASOSABS 0.01 01/03/2024 07:05 AM     BMP:    Lab Results   Component Value Date/Time     01/03/2024 07:05 AM    K 4.0 01/03/2024 07:05 AM    K 3.9 06/27/2023 07:20 PM    CL 97 01/03/2024 07:05 AM    CO2 24 01/03/2024 07:05 AM    BUN 24 01/03/2024 07:05 AM    LABALBU 3.8 01/03/2024 07:05 AM    CREATININE 0.9 01/03/2024 07:05 AM    CALCIUM 9.1 01/03/2024 07:05 AM    GFRAA >60 09/27/2018 02:01 PM    LABGLOM 58 01/03/2024 07:05 AM    GLUCOSE 165 01/03/2024 07:05 AM       Wound Documentation:   Wound 06/27/23 Arm Right;Upper (Active)   Number of days: 189       Wound 06/27/23 Arm Right;Lower (Active)   Number of days: 189       Wound 06/27/23 Arm Left;Lower (Active)   Number of days: 189       Wound 01/02/24 Pretibial Left (Active)   Dressing Status New dressing applied;Clean;Dry;Intact 01/02/24 1954   Drainage Amount Small (< 25%) 01/02/24 1954   Drainage Description Serosanguinous 01/02/24 1954   Odor None 01/02/24 1954   Number of days: 0       Assessment:  Acute respiratory failure with hypoxia acutely decompensated diastolic congestive heart failure with associated large bilateral pleural effusions  Acute COVID-19 infection   Urinary tract infection, recurrent  Acute metabolic encephalopathy vs delirium with previously undiagnosed vascular dementia?  (Extensive vascular disease related changes, atrophy and prior strokes on admission CT)  Atrial fibrillation on chronic Eliquis  CKD stage IIIb  Sick sinus syndrome status post pacemaker placement  Non-insulin-dependent diabetes mellitus type 2 with hypoglycemia on arrival  Hypertensive urgency with essential hypertension  Hyperlipidemia  Hypothyroidism    Plan:   Chetna is feeling better

## 2024-01-03 NOTE — PROGRESS NOTES
SPEECH/LANGUAGE PATHOLOGY  CLINICAL ASSESSMENT OF SWALLOWING FUNCTION   and PLAN OF CARE    PATIENT NAME:  Chetna Lundy  (female)     MRN:  [id number]    :  3/5/1929  (89 y.o.)  STATUS:  Inpatient: Room 0529/0529-01    TODAY'S DATE:  1/3/2024  REFERRING PROVIDER:  SLP swallowing-dysphagia evaluation and treatment  Start:  24 0700,   End:  24 0700,   ONE TIME,   Standing Count:  1 Occurrences,   R       Hiram Young, APRN - CNP   REASON FOR REFERRAL: known dysphagia, clarify current needs   EVALUATING THERAPIST: Neeta Oropeza, DUANE                 RESULTS:    DYSPHAGIA DIAGNOSIS:   Clinical indicators of moderate-severe oropharyngeal phase dysphagia       DIET RECOMMENDATIONS:  Minced and moist consistency solids (IDDSI level 5) with  honey consistency (moderately thick - IDDSI level 3)  liquids     FEEDING RECOMMENDATIONS:     Assistance level:  Supervision is needed during all oral intake, Encourage self-feeding      Compensatory strategies recommended: Upright in bed/ chair as tolerated and Fully alert for all PO      Discussed recommendations with nursing and/or faxed report to referring provider: No secondary to no diet/liquid change recommended     SPEECH THERAPY  PLAN OF CARE   The dysphagia POC is established based on physician order, dysphagia diagnosis and results of clinical assessment     Skilled SLP intervention for dysphagia management on acute care up to 5 x per week until goals met, pt plateaus in function and/or discharged from hospital    Conditions Requiring Skilled Therapeutic Intervention for dysphagia:    Patient is performing below functional baseline d/t  current acute condition, respiratory compromise, multiple medications, and/or increased dependency upon caregivers.  History of dysphagia/altered consistency      Specific dysphagia interventions to include:     compensatory swallowing strategies to improve airway protection and swallow function.  Training in positioning for  improved integrity of swallow  ongoing mealtime assessment to provide diet modification and compensatory strategy implementation to minimize risk of aspiration associated with PO intake  PO trials of upgraded diet textures with SLP only to determine the least restrictive PO diet     Specific instructions for next treatment:  development and training of compensatory swallow strategies to improve airway protection and swallow function  Patient Treatment Goals:    Short Term Goals:  Pt will participate in MBSS to fully assess oropharyngeal swallow function and to assist in determining the least restrictive PO diet to maintain adequate nutrition/hydration   Pt will implement identified compensatory swallowing strategies on 90% of opportunities or greater to improve airway protection and swallow function.  Pt will decrease clinical indicators of airway compromise to 2 or less during swallowing of 8 ounces of liquid  minimal verbal prompts    Long Term Goals:   Pt will maintain adequate nutrition/hydration via PO intake of the least restrictive oral diet with implementation of safe swallow/ compensatory strategies and decrease signs/symptoms of aspiration to less than 1 x/day.   OTHER RECOMMENDATIONS:  A Video Swallow Study (MBSS) is recommended and requires a physician order prior to discharge to determine safest least restrictive diet.  Per NH documentation patient recently changed to honey thick due to excessive coughing on nectar thick      Patient/family Goal:    To be able to eat/drink     Plan of care discussed with Patient and Family   The Patient did not demonstrate complete understanding of the diagnosis, prognosis and plan of care and Family understand(s) the diagnosis, prognosis and plan of care     Rehabilitation Potential/Prognosis: good                    ADMITTING DIAGNOSIS: Hypokalemia [E87.6]  Hypoglycemia [E16.2]  Bilateral pleural effusion [J90]  Acute diastolic CHF (congestive heart failure) (HCC)

## 2024-01-03 NOTE — PROGRESS NOTES
Pharmacy consulted for the benefit of covid therapy    Patient currently on 5 L/min NC supplemental oxygen. On 1-4 L/min at baseline per NH MAR. LDH mildly elevated.    Discussed with Dr. Alas. Will hold off on any COVID-19 advanced therapy at this time as symptoms are likely related to CHF over COVID-19.    Violette Morin, PharmD, BCPS 1/3/2024 10:16 AM   Ext: [phone]

## 2024-01-03 NOTE — PLAN OF CARE
Problem: Safety - Adult  Goal: Free from fall injury  1/3/2024 1516 by Kera Fuentes RN  Outcome: Progressing  1/3/2024 0625 by Tai Rich RN  Outcome: Progressing     Problem: Discharge Planning  Goal: Discharge to home or other facility with appropriate resources  1/3/2024 1516 by Kera Fuentes RN  Outcome: Progressing  1/3/2024 0625 by Tai Rich RN  Outcome: Progressing     Problem: Pain  Goal: Verbalizes/displays adequate comfort level or baseline comfort level  1/3/2024 1516 by Kera Fuentes RN  Outcome: Progressing  1/3/2024 0625 by Tai Rich RN  Outcome: Progressing     Problem: Skin/Tissue Integrity  Goal: Absence of new skin breakdown  Description: 1.  Monitor for areas of redness and/or skin breakdown  2.  Assess vascular access sites hourly  3.  Every 4-6 hours minimum:  Change oxygen saturation probe site  4.  Every 4-6 hours:  If on nasal continuous positive airway pressure, respiratory therapy assess nares and determine need for appliance change or resting period.  1/3/2024 1516 by Kera Fuentes RN  Outcome: Progressing  1/3/2024 0625 by Tai Rich RN  Outcome: Progressing     Problem: ABCDS Injury Assessment  Goal: Absence of physical injury  1/3/2024 1516 by Kera Fuentes RN  Outcome: Progressing  1/3/2024 0625 by Tai Rich RN  Outcome: Progressing     Problem: Chronic Conditions and Co-morbidities  Goal: Patient's chronic conditions and co-morbidity symptoms are monitored and maintained or improved  Outcome: Progressing

## 2024-01-03 NOTE — PROGRESS NOTES
OCCUPATIONAL THERAPY INITIAL EVALUATION      [facility] [address], [place]       Date:1/3/2024                                                  Patient Name: Chetna Lundy  MRN: [id number]  : 3/5/1929  Room: [facility]/0529-    Evaluating OT: Nancy Saecapo, OTR/L #[id number]    Referring Provider::  Joey Clark DO     Specific Provider Orders/Date: OT evaluation and treatment 24    Placement recommendation: sub acute rehab    Diagnosis:  Hypokalemia [E87.6]  Hypoglycemia [E16.2]  Bilateral pleural effusion [J90]  Acute diastolic CHF (congestive heart failure) (HCC) [I50.31]  Fall from bed, initial encounter [W06.XXXA]  Skin tear of left lower leg without complication, initial encounter [S81.812A]  Urinary tract infection without hematuria, site unspecified [N39.0]  Altered mental status, unspecified altered mental status type [R41.82]      Pertinent Medical History:  has a past medical history of Colitis, Diabetes mellitus ([facility]), GERD (gastroesophageal reflux disease), Hearing impaired, Hyperlipidemia, Hypertension, Small bowel obstruction ([facility]), and Thyroid disease.       Precautions:  Fall Risk, O2, COVID+, c- diff , isolation, skin, blancas, alarm, honey thick    Assessment of current deficits   [x] Functional mobility   [x]ADLs  [x] Strength               [x]Cognition   [x] Functional transfers   [] IADLs         [x] Safety Awareness   [x]Endurance   [] Fine Coordination              [x] Balance      [] Vision/perception   []Sensation    [x]Gross Motor Coordination  [] ROM  [] Delirium                   [] Motor Control     OT PLAN OF CARE   OT POC based on physician orders, patient diagnosis and results of clinical assessment    Frequency/Duration  1-3 days/wk for 2 weeks PRN   Specific OT Treatment to include:   * Instruction/training on adapted ADL techniques and AE recommendations to increase functional independence within precautions     Instruction/training on safety and adapted techniques for completion of simulated dressing and bathing tasks   Mobility-  Instruction/training on safety and improved independence with bed mobility , functional transfers , and functional mobility . Therapist facilitated and provided cues for body alignment and hand/feet placement.     Activity tolerance- Instruction/training on energy conservation/work simplification for completion of ADLs:.     Skilled positioning/alignment-  Proper Positioning/Alignment in bed    Skilled monitoring of O2 sats-   SpO2 at rest 100%, SpO2 during activity 98%, SpO2 at end of session 100%         Rehab Potential: Good  for established goals     Patient / Family Goal:  Not stated    Patient and/or family were instructed on functional diagnosis, prognosis/goals and OT plan of care. Demonstrated fair understanding.     Eval Complexity: Low    Time In: 1050  Time Out: 1115  Total Treatment Time: 15 minutes    Min Units   OT Eval Low 97107  x     OT Eval Medium 05115      OT Eval High 94335       OT Re-Eval 49023       Therapeutic Ex 23651       Therapeutic Activities 28818  10 1   ADL/Self Care 06915  5 0   Orthotic Management 35507       Neuro Re-Ed 07989       Non-Billable Time          Evaluation Time includes thorough review of current medical information, gathering information on past medical history/social history and prior level of function, completion of standardized testing/informal observation of tasks, assessment of data and education on plan of care and goals.            Larisa Lima, OTR/L #[id number]

## 2024-01-03 NOTE — PROGRESS NOTES
Physical Therapy  Physical Therapy Initial Evaluation/Plan of Care    Room #:  0529/0529-01  Patient Name: Chetna Lundy  YOB: 1929  MRN: [id number]    Date of Service: 1/3/2024     Tentative placement recommendation: Subacute Rehab  Equipment recommendation: To be determined      Evaluating Physical Therapist: Benjamin Modi PT, DPT #[id number]      Specific Provider Orders/Date/Referring Provider :     01/01/24 2130    PT eval and treat  Start:  01/01/24 2130,   End:  01/01/24 2130,   ONE TIME,   Standing Count:  1 Occurrences,   R       Joey Clark, DO Acknowledge New    Admitting Diagnosis:   Hypokalemia [E87.6]  Hypoglycemia [E16.2]  Bilateral pleural effusion [J90]  Acute diastolic CHF (congestive heart failure) ([facility]) [I50.31]  Fall from bed, initial encounter [W06.XXXA]  Skin tear of left lower leg without complication, initial encounter [S81.812A]  Urinary tract infection without hematuria, site unspecified [N39.0]  Altered mental status, unspecified altered mental status type [R41.82]      Surgery: none  Visit Diagnoses         Codes    Hypoglycemia     E16.2    Fall from bed, initial encounter     W06.XXXA    Skin tear of left lower leg without complication, initial encounter     S81.812A    Bilateral pleural effusion     J90    Urinary tract infection without hematuria, site unspecified     N39.0    Hypokalemia     E87.6            Patient Active Problem List   Diagnosis    Substernal thyroid    Right cataract    Left cataract    SBO (small bowel obstruction) ([facility])    Pneumonia of left lung due to infectious organism    Incarcerated inguinal hernia, unilateral    Failure to thrive in adult    Moderate protein-calorie malnutrition (HCC)    COVID-19    Acute diastolic CHF (congestive heart failure) ([facility])    Altered mental status        ASSESSMENT of Current Deficits Patient exhibits decreased strength, balance, and endurance impairing functional mobility, transfers, gait , gait distance, and      Patient education  Patient educated on role of Physical Therapy, risks of immobility, safety and plan of care, energy conservation,  importance of mobility while in hospital , ankle pumps, quad set and glut set for edema control, blood clot prevention, importance and purpose of adaptive device and adjusted to proper height for the patient., safety , O2 line management and safety , positioning for skin integrity and comfort, and proper use/technique of incentive spirometer     Patient response to education:   Pt verbalized understanding Pt demonstrated skill Pt requires further education in this area   Yes Partial Yes      Treatment:  Patient practiced and was instructed/facilitated in the following treatment: Patient Sat edge of bed 5 minutes with Supervision  to increase dynamic sitting balance and activity tolerance. Pt performed bed mobility, transferred to chair, seated exercises.      Therapeutic Exercises:  ankle pumps, heel raises, hip abduction/adduction, long arc quad, and seated marching  x 15 reps.       At end of session, patient in chair with daughter present call light and phone within reach,  all lines and tubes intact, nursing notified.      Patient would benefit from continued skilled Physical Therapy to improve functional independence and quality of life.         Patient's/ family goals   rehab    Time in     920  Time out  1005    Total Treatment Time  25 minutes    Evaluation time includes thorough review of current medical information, gathering information on past medical history/social history and prior level of function, completion of standardized testing/informal observation of tasks, assessment of data, and development of Plan of care and goals.     CPT codes:  Low Complexity PT evaluation (81232)  Therapeutic activities (97591)   15 minutes  1 unit(s)  Therapeutic exercises (20397)   10 minutes  1 unit(s)    Benjamin Modi, PT

## 2024-01-03 NOTE — DISCHARGE INSTR - COC
Continuity of Care Form    Patient Name: Chetna Lundy   :  3/5/1929  MRN:  [id number]    Admit date:  2024  Discharge date:  ***    Code Status Order: DNR-CCA   Advance Directives:     Admitting Physician:  Joey Clark DO  PCP: Shaggy Jean MD    Discharging Nurse: ***  Discharging Hospital Unit/Room#: 0529/0529-01  Discharging Unit Phone Number: ***    Emergency Contact:   Extended Emergency Contact Information  Primary Emergency Contact: Wilder Lundy  Home Phone: [phone]  Relation: Child  Secondary Emergency Contact: JEVON ALMONTE  Home Phone: [phone]  Relation: Child   needed? No    Past Surgical History:  Past Surgical History:   Procedure Laterality Date    APPENDECTOMY      CATARACT REMOVAL WITH IMPLANT Right 04/15/2014    CATARACT REMOVAL WITH IMPLANT Left 11/15/2016    COLECTOMY N/A 2023    LAPAROSCOPIC INCARCERATED STRANGULATED RIGHT INGUINAL HERNIA REPAIR WITH MESH performed by Chuy Baron MD at [facility] OR    HYSTERECTOMY (CERVIX STATUS UNKNOWN)      LAPAROTOMY  2023    release sbo incarcerated strangulated right inguinal hernia with mesh repair    OTHER SURGICAL HISTORY  2012    RIGHT HEMITHYROIDECTOMY    PACEMAKER INSERTION      PACEMAKER PLACEMENT      [employer]        Immunization History:   Immunization History   Administered Date(s) Administered    COVID-19, MODERNA Bivalent, (age 12y+), IM, 50 mcg/0.5 mL 10/25/2022    TDaP, ADACEL (age 10y-64y), BOOSTRIX (age 10y+), IM, 0.5mL 2023       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    Right cataract H26.9    Left cataract H26.9    SBO (small bowel obstruction) (HCC) K56.609    Pneumonia of left lung due to infectious organism J18.9    Incarcerated inguinal hernia, unilateral K40.30    Failure to thrive in adult R62.7    Moderate protein-calorie malnutrition (HCC) E44.0    COVID-19 U07.1    Acute diastolic CHF (congestive heart failure)    Name:  Address:  Dialysis Schedule:  Phone:  Fax:    / signature: Electronically signed by STEPHAN Lake on 1/3/24 at 3:36 PM EST    PHYSICIAN SECTION    Prognosis: Good    Condition at Discharge: Stable    Rehab Potential (if transferring to Rehab): Good    Recommended Labs or Other Treatments After Discharge: ***    Physician Certification: I certify the above information and transfer of Chetna Lundy  is necessary for the continuing treatment of the diagnosis listed and that she requires Skilled Nursing Facility for greater 30 days.     Update Admission H&P: {CHP DME Changes in HandP:[id number]}    PHYSICIAN SIGNATURE:  {Esignature:[id number]}    ***HEART FAILURE - CONGESTIVE HEART FAILURE***  DISCHARGE INSTRUCTIONS:  GUIDELINES TO FOLLOW AT [facility]/LTAC/SNF/ Assisted Living    No future appointments.       MEDICATIONS:  Please notify the doctor if patient is not able to take their medications or if medications are being held for any reasons (such as low blood pressure ect.)  Do not give the patient ibuprofen (Advil or Motrin), naproxen (Aleve) without talking to the doctor first. This could make their heart failure worse.         WEIGHT MONITORING:   Weigh patient every day in the morning after they void (If patient is able to stand, please get a standing weight.)   Notify the doctor of a weight gain of 3 pounds or more in 1 day   OR  a total of 5 pounds or more in 1 week             DIET   Cardiac heart healthy diet:  Low sodium diet: no  more than 2,000mg (2 grams) of salt / sodium per day (which equals to a little less than  a teaspoon of salt)/ Cardiac Diet: Low saturated / low trans fat, no added salt, caffeine restricted    If patient is there for rehab and will be returning home in the near future; reinforce with the patient and the family to follow a low sodium diet (2,000 mg)- avoid using salt at the table, avoid / limit use of canned soups, processed / packaged foods,

## 2024-01-03 NOTE — PLAN OF CARE
Problem: Safety - Adult  Goal: Free from fall injury  Outcome: Progressing     Problem: Discharge Planning  Goal: Discharge to home or other facility with appropriate resources  Outcome: Progressing     Problem: Pain  Goal: Verbalizes/displays adequate comfort level or baseline comfort level  Outcome: Progressing     Problem: Skin/Tissue Integrity  Goal: Absence of new skin breakdown  Description: 1.  Monitor for areas of redness and/or skin breakdown  2.  Assess vascular access sites hourly  3.  Every 4-6 hours minimum:  Change oxygen saturation probe site  4.  Every 4-6 hours:  If on nasal continuous positive airway pressure, respiratory therapy assess nares and determine need for appliance change or resting period.  Outcome: Progressing     Problem: ABCDS Injury Assessment  Goal: Absence of physical injury  Outcome: Progressing

## 2024-01-03 NOTE — PROGRESS NOTES
4 Eyes Skin Assessment     NAME:  Chetna Lundy  YOB: 1929  MEDICAL RECORD NUMBER:  [id number]    The patient is being assessed for  Admission    I agree that at least one RN has performed a thorough Head to Toe Skin Assessment on the patient. ALL assessment sites listed below have been assessed.      Areas assessed by both nurses:    Head, Face, Ears, Shoulders, Back, Chest, Arms, Elbows, Hands, Sacrum. Buttock, Coccyx, Ischium, and Legs. Feet and Heels        Does the Patient have a Wound? Yes wound(s) were present on assessment. LDA wound assessment was Initiated and completed by RN       Phillip Prevention initiated by RN: Yes  Wound Care Orders initiated by RN: Yes    Pressure Injury (Stage 3,4, Unstageable, DTI, NWPT, and Complex wounds) if present, place Wound referral order by RN under : No    New Ostomies, if present place, Ostomy referral order under : No     Nurse 1 eSignature: Electronically signed by Tai Rich RN on 1/2/24 at 11:14 PM EST    **SHARE this note so that the co-signing nurse can place an eSignature**    Nurse 2 eSignature: Electronically signed by Mora Real RN on 1/3/24 at 6:12 AM EST

## 2024-01-03 NOTE — PROGRESS NOTES
Assessment and Plan  Patient is a 89 y.o. female with the following medical Problems:   Acute respiratory failure with hypoxia acutely decompensated diastolic congestive heart failure with associated large bilateral pleural effusions  Urinary tract infection, recurrent  Acute metabolic encephalopathy vs delirium with previously undiagnosed vascular dementia?  (Extensive vascular disease related changes, atrophy and prior strokes on admission CT)  Atrial fibrillation on chronic Eliquis  CKD stage II  Sick sinus syndrome status post pacemaker placement  Non-insulin-dependent diabetes mellitus type 2 with hypoglycemia on arrival  Hypertensive urgency with essential hypertension  Hyperlipidemia  Hypothyroidism    Plan of care:  Continue with ceftriaxone and azithromycin for 5 days.  We will proceed with right-sided thoracentesis to facilitate weaning of supplemental oxygen.  Hold Eliquis.  Patient is currently on Lovenox for DVT prophylaxis.  Will proceed with bilateral thoracentesis in 2 consecutive days.  Legionella urinary antigen and strep urinary antigen.  Patient is not a candidate for advanced therapy for COVID-19 since her symptoms are most likely related to congestive heart failure of her COVID-19.  Will continue to monitor her progress.  Judicious diuresis with close monitoring of kidney function  Protonix for GI prophylaxis  Speech therapy evaluation and PT OT    History of Present Illness:   Patient is a 89-year-old pleasant woman who was admitted on January 2, 2023 with progressive shortness of breath.  CT scan showed worsening bilateral pleural effusions compared to a CT scan of the chest from February 17, 2023.  Patient has been on supplemental oxygen however she is a poor candidate for advanced therapy for COVID-19 due to her age and the likelihood that her symptoms are mostly related to heart failure.    Daily progress:  January 3, 2024: Patient is scheduled for thoracentesis.  She has no fever, chills,

## 2024-01-03 NOTE — CARE COORDINATION
Case Management Assessment  Initial Evaluation    Date/Time of Evaluation: 1/3/2024 3:11 PM  Assessment Completed by: STEPHAN Lake    If patient is discharged prior to next notation, then this note serves as note for discharge by case management.    Patient Name: Chetna Lundy                   YOB: 1929  Diagnosis: Hypokalemia [E87.6]  Hypoglycemia [E16.2]  Bilateral pleural effusion [J90]  Acute diastolic CHF (congestive heart failure) (HCC) [I50.31]  Fall from bed, initial encounter [W06.XXXA]  Skin tear of left lower leg without complication, initial encounter [S81.812A]  Urinary tract infection without hematuria, site unspecified [N39.0]  Altered mental status, unspecified altered mental status type [R41.82]                   Date / Time: 1/1/2024  2:28 PM    Patient Admission Status: Inpatient   Readmission Risk (Low < 19, Mod (19-27), High > 27): Readmission Risk Score: 17.2    Current PCP: Shaggy Jean MD  PCP verified by CM? Yes    Chart Reviewed: Yes      History Provided by: Patient  Patient Orientation: Alert and Oriented, Person, Place, Situation    Patient Cognition: Alert    Hospitalization in the last 30 days (Readmission):  No    If yes, Readmission Assessment in CM Navigator will be completed.    Advance Directives:      Code Status: DNR-CCA   Patient's Primary Decision Maker is: Legal Next of Kin    Primary Decision Maker: Wilder Lundy - Child - [phone]    Primary Decision Maker: JEVON ALMONTE - Child - [phone]    Primary Decision Maker: Rachel Michelle - Child - [phone]    Discharge Planning:    Patient lives with: Other (Comment) Type of Home: Skilled Nursing Facility  Primary Care Giver: Other (Comment) (facility staff)  Patient Support Systems include: Children   Current Financial resources: Medicare, Medicaid  Current community resources: ECF/Home Care  Current services prior to admission: Skilled Nursing Facility            Current DME:

## 2024-01-03 NOTE — ACP (ADVANCE CARE PLANNING)
Advance Care Planning     Advance Care Planning Activator (Inpatient)  Conversation Note      Date of ACP Conversation: 1/3/2024     Conversation Conducted with: Patient with Decision Making Capacity   Healthcare Decision Maker: Named in Advance Directive or Healthcare Power of  (name) Jevon Goldman    ACP Activator: TRINY [facility]      Health Care Decision Maker:     Current Designated Health Care Decision Maker:     Primary Decision Maker: Wilder Lundy - Child - [phone]    Primary Decision Maker: JEVON GOLDMAN - Child - [phone]    Primary Decision Maker: Rachel Michelle - Child - [phone]    Today we documented Decision Maker(s) consistent with ACP documents on file.    Care Preferences    Ventilation:  \"If you were in your present state of health and suddenly became very ill and were unable to breathe on your own, what would your preference be about the use of a ventilator (breathing machine) if it were available to you?\"      Would the patient desire the use of ventilator (breathing machine)?: no    \"If your health worsens and it becomes clear that your chance of recovery is unlikely, what would your preference be about the use of a ventilator (breathing machine) if it were available to you?\"     Would the patient desire the use of ventilator (breathing machine)?: No      Resuscitation  \"CPR works best to restart the heart when there is a sudden event, like a heart attack, in someone who is otherwise healthy. Unfortunately, CPR does not typically restart the heart for people who have serious health conditions or who are very sick.\"    \"In the event your heart stopped as a result of an underlying serious health condition, would you want attempts to be made to restart your heart (answer \"yes\" for attempt to resuscitate) or would you prefer a natural death (answer \"no\" for do not attempt to resuscitate)?\" yes       [] Yes   [x] No   Educated Patient / Decision Maker regarding  differences between Advance Directives and portable DNR orders.    Length of ACP Conversation in minutes:      Conversation Outcomes:  ACP discussion completed    Follow-up plan:    [] Schedule follow-up conversation to continue planning  [x] Referred individual to Provider for additional questions/concerns   [] Advised patient/agent/surrogate to review completed ACP document and update if needed with changes in condition, patient preferences or care setting    [] This note routed to one or more involved healthcare providers

## 2024-01-03 NOTE — PROGRESS NOTES
Patient came down to Special Procedures for ultrasound guided right thoracentesis.    Procedure was explained, questions were answered.    1443    Starting procedure /58 53 18 100% on 5 liters nasal canula     1447    Ending procedure /56 54 16 100% on 5 liters nasal canula     1100 cc of clear straw color pleural fluid drained from patient, petrolatum dressing folded 4 x 4 and tegaderm applied to right back.     Patients DSD dressing can be removed in 24 hours    Patient tolerated procedure    Post procedure chest xray taken    Respiratory came took specimen for PH    Specimen sent to lab, with printed     Nurse to nurse called spoke with Kera RN, nurse notified of above information    Patient transported back to floor.

## 2024-01-04 ENCOUNTER — APPOINTMENT (OUTPATIENT)
Dept: GENERAL RADIOLOGY | Age: 89
DRG: 177 | End: 2024-01-04
Payer: MEDICARE

## 2024-01-04 LAB
ALBUMIN SERPL-MCNC: 4.2 G/DL (ref 3.5–5.2)
ALP SERPL-CCNC: 81 U/L (ref 35–104)
ALT SERPL-CCNC: 17 U/L (ref 0–32)
ANION GAP SERPL CALCULATED.3IONS-SCNC: 19 MMOL/L (ref 7–16)
AST SERPL-CCNC: 19 U/L (ref 0–31)
BASOPHILS # BLD: 0 K/UL (ref 0–0.2)
BASOPHILS NFR BLD: 0 % (ref 0–2)
BILIRUB SERPL-MCNC: 0.4 MG/DL (ref 0–1.2)
BUN SERPL-MCNC: 35 MG/DL (ref 6–23)
CALCIUM SERPL-MCNC: 9.2 MG/DL (ref 8.6–10.2)
CHLORIDE SERPL-SCNC: 94 MMOL/L (ref 98–107)
CO2 SERPL-SCNC: 28 MMOL/L (ref 22–29)
CREAT SERPL-MCNC: 1.3 MG/DL (ref 0.5–1)
EOSINOPHIL # BLD: 0 K/UL (ref 0.05–0.5)
EOSINOPHILS RELATIVE PERCENT: 0 % (ref 0–6)
ERYTHROCYTE [DISTWIDTH] IN BLOOD BY AUTOMATED COUNT: 14.6 % (ref 11.5–15)
GFR SERPL CREATININE-BSD FRML MDRD: 40 ML/MIN/1.73M2
GLUCOSE BLD-MCNC: 202 MG/DL (ref 74–99)
GLUCOSE BLD-MCNC: 238 MG/DL (ref 74–99)
GLUCOSE BLD-MCNC: 341 MG/DL (ref 74–99)
GLUCOSE SERPL-MCNC: 322 MG/DL (ref 74–99)
HCT VFR BLD AUTO: 34.4 % (ref 34–48)
HGB BLD-MCNC: 11 G/DL (ref 11.5–15.5)
LDH SERPL-CCNC: 237 U/L (ref 135–214)
LYMPHOCYTES NFR BLD: 0.45 K/UL (ref 1.5–4)
LYMPHOCYTES RELATIVE PERCENT: 4 % (ref 20–42)
MCH RBC QN AUTO: 35.5 PG (ref 26–35)
MCHC RBC AUTO-ENTMCNC: 32 G/DL (ref 32–34.5)
MCV RBC AUTO: 111 FL (ref 80–99.9)
MONOCYTES NFR BLD: 0.36 K/UL (ref 0.1–0.95)
MONOCYTES NFR BLD: 4 % (ref 2–12)
NEUTROPHILS NFR BLD: 92 % (ref 43–80)
NEUTS SEG NFR BLD: 9.59 K/UL (ref 1.8–7.3)
PLATELET CONFIRMATION: NORMAL
PLATELET, FLUORESCENCE: 121 K/UL (ref 130–450)
PMV BLD AUTO: 13.5 FL (ref 7–12)
POTASSIUM SERPL-SCNC: 3.7 MMOL/L (ref 3.5–5)
PROT SERPL-MCNC: 6.2 G/DL (ref 6.4–8.3)
RBC # BLD AUTO: 3.1 M/UL (ref 3.5–5.5)
RBC # BLD: ABNORMAL 10*6/UL
SODIUM SERPL-SCNC: 141 MMOL/L (ref 132–146)
WBC OTHER # BLD: 10.4 K/UL (ref 4.5–11.5)

## 2024-01-04 PROCEDURE — 2580000003 HC RX 258: Performed by: INTERNAL MEDICINE

## 2024-01-04 PROCEDURE — 6360000002 HC RX W HCPCS: Performed by: INTERNAL MEDICINE

## 2024-01-04 PROCEDURE — 2060000000 HC ICU INTERMEDIATE R&B

## 2024-01-04 PROCEDURE — 36415 COLL VENOUS BLD VENIPUNCTURE: CPT

## 2024-01-04 PROCEDURE — 82962 GLUCOSE BLOOD TEST: CPT

## 2024-01-04 PROCEDURE — 71045 X-RAY EXAM CHEST 1 VIEW: CPT

## 2024-01-04 PROCEDURE — 83615 LACTATE (LD) (LDH) ENZYME: CPT

## 2024-01-04 PROCEDURE — 92610 EVALUATE SWALLOWING FUNCTION: CPT | Performed by: SPEECH-LANGUAGE PATHOLOGIST

## 2024-01-04 PROCEDURE — 6370000000 HC RX 637 (ALT 250 FOR IP): Performed by: INTERNAL MEDICINE

## 2024-01-04 PROCEDURE — 99233 SBSQ HOSP IP/OBS HIGH 50: CPT | Performed by: INTERNAL MEDICINE

## 2024-01-04 PROCEDURE — 99222 1ST HOSP IP/OBS MODERATE 55: CPT

## 2024-01-04 PROCEDURE — 6360000002 HC RX W HCPCS

## 2024-01-04 PROCEDURE — 85025 COMPLETE CBC W/AUTO DIFF WBC: CPT

## 2024-01-04 PROCEDURE — 6370000000 HC RX 637 (ALT 250 FOR IP)

## 2024-01-04 PROCEDURE — 80053 COMPREHEN METABOLIC PANEL: CPT

## 2024-01-04 RX ORDER — FENTANYL CITRATE 0.05 MG/ML
12.5 INJECTION, SOLUTION INTRAMUSCULAR; INTRAVENOUS EVERY 4 HOURS PRN
Status: DISCONTINUED | OUTPATIENT
Start: 2024-01-04 | End: 2024-01-05 | Stop reason: HOSPADM

## 2024-01-04 RX ORDER — SODIUM CHLORIDE, SODIUM LACTATE, POTASSIUM CHLORIDE, CALCIUM CHLORIDE 600; 310; 30; 20 MG/100ML; MG/100ML; MG/100ML; MG/100ML
INJECTION, SOLUTION INTRAVENOUS CONTINUOUS
Status: DISCONTINUED | OUTPATIENT
Start: 2024-01-04 | End: 2024-01-05 | Stop reason: HOSPADM

## 2024-01-04 RX ORDER — HEPARIN SODIUM 5000 [USP'U]/ML
5000 INJECTION, SOLUTION INTRAVENOUS; SUBCUTANEOUS EVERY 8 HOURS SCHEDULED
Status: DISCONTINUED | OUTPATIENT
Start: 2024-01-04 | End: 2024-01-05 | Stop reason: HOSPADM

## 2024-01-04 RX ORDER — SCOLOPAMINE TRANSDERMAL SYSTEM 1 MG/1
1 PATCH, EXTENDED RELEASE TRANSDERMAL
Status: DISCONTINUED | OUTPATIENT
Start: 2024-01-04 | End: 2024-01-05 | Stop reason: HOSPADM

## 2024-01-04 RX ADMIN — HEPARIN SODIUM 5000 UNITS: 5000 INJECTION INTRAVENOUS; SUBCUTANEOUS at 16:17

## 2024-01-04 RX ADMIN — Medication 10 ML: at 20:25

## 2024-01-04 RX ADMIN — PANTOPRAZOLE SODIUM 40 MG: 40 TABLET, DELAYED RELEASE ORAL at 05:29

## 2024-01-04 RX ADMIN — INSULIN LISPRO 3 UNITS: 100 INJECTION, SOLUTION INTRAVENOUS; SUBCUTANEOUS at 11:16

## 2024-01-04 RX ADMIN — Medication 10 ML: at 11:17

## 2024-01-04 RX ADMIN — HEPARIN SODIUM 5000 UNITS: 5000 INJECTION INTRAVENOUS; SUBCUTANEOUS at 20:23

## 2024-01-04 RX ADMIN — FENTANYL CITRATE 12.5 MCG: 0.05 INJECTION, SOLUTION INTRAMUSCULAR; INTRAVENOUS at 11:52

## 2024-01-04 RX ADMIN — WATER 1000 MG: 1 INJECTION INTRAMUSCULAR; INTRAVENOUS; SUBCUTANEOUS at 20:23

## 2024-01-04 RX ADMIN — Medication 10 ML: at 20:24

## 2024-01-04 RX ADMIN — DEXAMETHASONE SODIUM PHOSPHATE 6 MG: 10 INJECTION INTRAMUSCULAR; INTRAVENOUS at 14:23

## 2024-01-04 RX ADMIN — FENTANYL CITRATE 12.5 MCG: 0.05 INJECTION, SOLUTION INTRAMUSCULAR; INTRAVENOUS at 16:30

## 2024-01-04 RX ADMIN — SODIUM CHLORIDE, POTASSIUM CHLORIDE, SODIUM LACTATE AND CALCIUM CHLORIDE: 600; 310; 30; 20 INJECTION, SOLUTION INTRAVENOUS at 16:17

## 2024-01-04 ASSESSMENT — PAIN SCALES - PAIN ASSESSMENT IN ADVANCED DEMENTIA (PAINAD)
NEGVOCALIZATION: 1
BREATHING: 0
CONSOLABILITY: 1
CONSOLABILITY: 0
BREATHING: 0
CONSOLABILITY: 1
TOTALSCORE: 2
FACIALEXPRESSION: 0
BREATHING: 1
BODYLANGUAGE: 0
NEGVOCALIZATION: 1
TOTALSCORE: 8
BREATHING: 1
BODYLANGUAGE: 0
FACIALEXPRESSION: 0
TOTALSCORE: 2
CONSOLABILITY: 1
BODYLANGUAGE: 2
FACIALEXPRESSION: 2
NEGVOCALIZATION: 1
NEGVOCALIZATION: 2
BODYLANGUAGE: 0
TOTALSCORE: 2
FACIALEXPRESSION: 0

## 2024-01-04 ASSESSMENT — PAIN SCALES - GENERAL: PAINLEVEL_OUTOF10: 0

## 2024-01-04 ASSESSMENT — PAIN SCALES - WONG BAKER: WONGBAKER_NUMERICALRESPONSE: 0

## 2024-01-04 NOTE — PROGRESS NOTES
Internal Medicine Progress Note    [facility]    Matt Rodriguez D.O., CHON Clark D.O., CHON Murguia D.O.    Lily Dahl, MSN, APRN, NP-C  Hiram Young, MSN, APRN-CNP  Watson Kamara, MSN, APRN, NP-C     Primary Care Physician: Shaggy Jean MD   Admitting Physician:  Joey Clark DO  Admission date and time: 1/1/2024  2:28 PM    Room:  [facility], [facility]0529-  Admitting diagnosis: Hypokalemia [E87.6]  Hypoglycemia [E16.2]  Bilateral pleural effusion [J90]  Acute diastolic CHF (congestive heart failure) (HCC) [I50.31]  Fall from bed, initial encounter [W06.XXXA]  Skin tear of left lower leg without complication, initial encounter [S81.812A]  Urinary tract infection without hematuria, site unspecified [N39.0]  Altered mental status, unspecified altered mental status type [R41.82]    Patient Name: Chetna Lundy  MRN: [id number]    Date of Service: 1/4/2024     Subjective:  Chetna is a 89 y.o. female who was seen and examined today,1/4/2024, at the bedside.  She is feeling better today.  She is resting comfortably and has a chest tube in the right side to -20 cm of suction.  She denies any shortness of breath he remains on 3 L oxygen nasal cannula.  She is scheduled for a left thoracentesis today.  Review of systems: Limited by confusion  Constitutional:   Positive for significant fatigue and malaise , - fever/chills  HEENT:   Denies ear pain, sore throat, sinus or eye problems.  Cardiovascular:   Denies any chest pain, irregular heartbeats, or palpitations.   Respiratory:   + but improving dyspnea at rest, + but improving dyspnea on exertion, + coughing, - sputum, - hemoptysis. + Orthopnea  Gastrointestinal:   - nausea, -vomiting. - diarrhea, - constipation. + poor appetite and poor intake. - abdominal pain.  Genitourinary:    Denies any urgency, frequency, hematuria. Voiding  without difficulty.  Extremities:   Acute on  chronic lower extremity swelling.   Neurology:    Positive for increased confusion.  Denies any headache or focal neurological deficits, positive for generalized weakness and fatigue without focal component  Psch:   Denies being anxious or depressed.  Musculoskeletal:    Denies  myalgias, joint complaints or back pain.   Integumentary:   Denies any rashes, ulcers, or excoriations.  Denies bruising.  Hematologic/Lymphatic:  Denies bruising or bleeding.    Physical Exam:  No intake/output data recorded.    Intake/Output Summary (Last 24 hours) at 1/4/2024 1647  Last data filed at 1/4/2024 0645  Gross per 24 hour   Intake --   Output 770 ml   Net -770 ml     I/O last 3 completed shifts:  In: -   Out: 1470 [Urine:1050; Chest Tube:420]  Patient Vitals for the past 96 hrs (Last 3 readings):   Weight   01/03/24 1216 55.3 kg (122 lb)   01/02/24 1954 55.4 kg (122 lb 3.2 oz)   01/02/24 0200 52.2 kg (115 lb)       Vital Signs:   Blood pressure (!) 152/53, pulse 50, temperature 97.9 °F (36.6 °C), temperature source Oral, resp. rate 20, height 1.651 m (5' 5\"), weight 55.3 kg (122 lb), SpO2 94 %, not currently breastfeeding.    General appearance:  Awake and alert, acute on chronic ill appearance, no apparent distress.  Head:  Normocephalic. No masses, lesions or tenderness.  Eyes:  PERRLA.  EOMI.  Sclera clear.    ENT:  Ears normal. Mucosa normal.  Neck:    Supple. Trachea midline. No thyromegaly. No JVD. No bruits.  Heart:    Rhythm regular. Rate controlled.  No murmurs.  Lungs:    Symmetric.  Diminished throughout which is symmetrical.  Rales appreciated.  Respiratory pattern is shallow and tachypneic without labor.   Abdomen:   Soft. Non-tender. Non-distended. Bowel sounds positive. No organomegaly or masses.  No pain on palpation.  Extremities:    Peripheral pulses present.  1-2+ BLE pitting peripheral edema.  No ulcers. No cyanosis. No clubbing.  Neurologic:    Alert x 3.  Generally weak without focal deficit.  Very hard of

## 2024-01-04 NOTE — CARE COORDINATION
Per IDR, patient now has a R Chest Tube and had a L Thoracentesis yesterday.  Patient was admitted from [facility].  KITTY spoke with Rena there who states patient is a long term care bedhold there and can return on DC, NO PRECERT needed for DC.  KITTY called and spoke with daughter, Christa to confirm plan back to [facility].  Will need JANE signed at DC.

## 2024-01-04 NOTE — PROGRESS NOTES
Patient came down for placement of right chest tube insertion.  Patient transferred to ct scan for procedure.  Patient positioned left side down with O2 and montioring equipment attached.  Patient was educated about the amount of radiation used with today's procedure.    Patient scanned with ct scan and marked by Dr. Conrad    Patient prepped and draped per protocol      2117 start procedure /61 50 17 94% on 5 liters nasal canula      10 [place] chest tube placed to right lateral chest with ct scan guidance by Dr. Conrad    Puncture site cleansed, securement device applied to secure tube, tegaderm applied over revolution and tube connected to atrium    2124 end procedure /49 50 22 97% on 5 liters nasal canula     Patient tolerated procedure well.    Order placed for patient to have continuous suction at -30     Patient transported back to the 5th floor by this nurse and Tao  stuart    Nurse to nurse given to Hilton RN, nurse notified of above information.

## 2024-01-04 NOTE — PLAN OF CARE
Problem: Safety - Adult  Goal: Free from fall injury  1/4/2024 0106 by Tai Rich RN  Outcome: Progressing     Problem: Discharge Planning  Goal: Discharge to home or other facility with appropriate resources  1/4/2024 0106 by Tai Rich RN  Outcome: Progressing     Problem: Pain  Goal: Verbalizes/displays adequate comfort level or baseline comfort level  1/4/2024 0106 by Tai Rich RN  Outcome: Progressing     Problem: Skin/Tissue Integrity  Goal: Absence of new skin breakdown  Description: 1.  Monitor for areas of redness and/or skin breakdown  2.  Assess vascular access sites hourly  3.  Every 4-6 hours minimum:  Change oxygen saturation probe site  4.  Every 4-6 hours:  If on nasal continuous positive airway pressure, respiratory therapy assess nares and determine need for appliance change or resting period.  1/4/2024 0106 by Tai Rich RN  Outcome: Progressing     Problem: ABCDS Injury Assessment  Goal: Absence of physical injury  1/4/2024 0106 by Tai Rich RN  Outcome: Progressing     Problem: Chronic Conditions and Co-morbidities  Goal: Patient's chronic conditions and co-morbidity symptoms are monitored and maintained or improved  1/4/2024 0106 by Tai Rich RN  Outcome: Progressing     Problem: Respiratory - Adult  Goal: Achieves optimal ventilation and oxygenation  Outcome: Progressing     Problem: Skin/Tissue Integrity - Adult  Goal: Incisions, wounds, or drain sites healing without S/S of infection  Outcome: Progressing

## 2024-01-04 NOTE — PROGRESS NOTES
Assessment and Plan  Patient is a 89 y.o. female with the following medical Problems:   Acute respiratory failure with hypoxia acutely decompensated diastolic congestive heart failure with associated large bilateral pleural effusions  Status post right-sided thoracentesis with right-sided pneumothorax  Urinary tract infection, recurrent  Acute metabolic encephalopathy vs delirium with previously undiagnosed vascular dementia?  (Extensive vascular disease related changes, atrophy and prior strokes on admission CT)  Atrial fibrillation on chronic Eliquis  CKD stage II  Sick sinus syndrome status post pacemaker placement  Non-insulin-dependent diabetes mellitus type 2 with hypoglycemia on arrival  Hypertensive urgency with essential hypertension  Hyperlipidemia  Hypothyroidism    Plan of care:  Continue with ceftriaxone and azithromycin for 5 days.  Continue with chest tube since it has significant output.  Right-sided pleural effusion is transudative.  Heparin subcu for DVT prophylaxis due to worsening kidney function  Will proceed with bilateral thoracentesis in 2 consecutive days.  Legionella urinary antigen and strep urinary antigen are negative.  Patient is not a candidate for advanced therapy for COVID-19 since her symptoms are most likely related to congestive heart failure of her COVID-19.  Will continue to monitor her progress.  Judicious diuresis with close monitoring of kidney function  Protonix for GI prophylaxis  Speech therapy evaluation and PT OT  Chest x-ray today  Low dose IVF    History of Present Illness:   Patient is a 89-year-old pleasant woman who was admitted on January 2, 2023 with progressive shortness of breath.  CT scan showed worsening bilateral pleural effusions compared to a CT scan of the chest from February 17, 2023.  Patient has been on supplemental oxygen however she is a poor candidate for advanced therapy for COVID-19 due to her age and the likelihood that her symptoms are mostly

## 2024-01-04 NOTE — PROGRESS NOTES
LEVEL OF SWALLOW FUNCTION:    PAST HISTORY OF OROPHARYNGEAL DYSPHAGIA?: yes    Home diet: Minced and moist consistency solids (IDDSI level 5) with  honey consistency (moderately thick - IDDSI level 3)  liquids due to excessive coughing with nectar thick per NH   Current Diet Order:  Diet NPO    PROCEDURE:  Consistencies Administered During the Evaluation   Liquids: Not presented due not able to clear own secretions at time of exam    Solids:  not appropriate      Method of Intake:     Patient able to assist       Position:   Sitting in bed with head elevated above 75 degrees    CLINICAL ASSESSMENT:  Oral Stage:       The oral stage of swallowing was within functional limits for consistencies administered      Pharyngeal Stage:    Patient having increased difficulty clearing own secretions and was needing to be suctioned by nursing upon SLP entering room this is a significant change compared to eval completed yesterday where she tolerated minced and moist with honey thick      Cognition:   Alert & Oriented x 2 and Follows 3 - step directions appropriate for this assessment    Oral Peripheral Examination   Generalized oral weakness    Current Respiratory Status    5 liters O2 via nasal cannula     Parameters of Speech Production  Respiration:  Shortness of breath  Quality:   Within functional limits  Intensity: Within functional limits    Volitional Swallow: present     Volitional Cough:   present very moist at times productive but not jillian to consistently clear own secretions from airway.    Pain: 8/10 - Pain location: back nursing aware and had medicated patient    EDUCATION:   The Speech Language Pathologist (SLP) completed education regarding results of evaluation and that intervention is warranted at this time.  Learner: Patient and Family  Education: Reviewed results and recommendations of this evaluation  Evaluation of Education:  Verbalizes understanding    This plan may be re-evaluated and revised as  warranted.      Evaluation Time includes thorough review of current medical information, gathering information on past medical history/social history and prior level of function, completion of standardized testing/informal observation of tasks, assessment of data and education on plan of care and goals.    [x]The admitting diagnosis and active problem list, have been reviewed prior to initiation of this evaluation.        ACTIVE PROBLEM LIST:   Patient Active Problem List   Diagnosis    Substernal thyroid    Right cataract    Left cataract    SBO (small bowel obstruction) (HCC)    Pneumonia of left lung due to infectious organism    Incarcerated inguinal hernia, unilateral    Failure to thrive in adult    Moderate protein-calorie malnutrition (HCC)    COVID-19    Acute diastolic CHF (congestive heart failure) (HCC)    Altered mental status         CPT code:  57832  bedside swallow adonay Oropeza MSCCC/SLP  Speech Language Pathologist  [id number]

## 2024-01-04 NOTE — PROGRESS NOTES
At 1945, I was contacted by Dr. Conrad about the pt's recent chest XR showing \"There is a large right pneumothorax measuring approximately 50%.\" He asked to obtain consent from the power of  and to closely monitor for rapid decline. Vitals were taken, and Christa Goldman was at the bedside. She was informed of the procedure. Then she gave me the number of Rodriguez Lundy ([phone]), who has due power of . He agreed to the procedure. Procedure started at 2117 and ended at 2124. When the pt came back from the chest tube insertion, vitals were taken and the second power of  was contacted (Rachel Michelle). No question at this time from the family. Will continue to closely monitor.    COVID-19 Telephone Triage  Has the patient tested positive for Coronavirus within the past month or in the process of testing for Coronavirus now?    NO - Has the patient or anyone in the patient's household, or if the patient will require a person to accompany them in the exam room (patient is a minor, disabled, etc.) has that person tested positive for Coronavirus within the past month? No - Identify symptoms and obtain travel and exposure history:       Does the patient or any member of the patient's household, or the person who will accompany the patient to their appointment have a fever, sore throat, cough, shortness of breath, runny nose, congestion, nausea, vomiting, diarrhea, shaking or chills or new onset of loss of taste or smell? no    In the past 14 days has the patient or the person who will accompany the patient to their appointment, traveled outside of [place]? (For patients who live in [place], do not consider [place] as out of state travel) no    In the past 14 days has the patient or the person who will accompany the patient to their appointment, had close contact with an individual outside of their household who has received a Coronavirus diagnosis or had contact with anyone who is in the process of testing? no  If all the questions in the screening were NO-   Clinic visit can proceed as scheduled or be scheduled per current scheduling protocols.     Otilio was advised of increased risk of COVID-19 exposure due to contact with communal spaces and additional persons. Patient Accepts appointment.    If the patient needs to be seen in the ED for any reason:  Call the ED of patient’s choice to let them know of a patient with a positive COVID-19 screen is being sent to the ED for further evaluation.    Have the ED clarify the location the patient should go once they arrive at the ED. Inform the [place] Take note of any instructions givento you by the  ED.    Communicate instructions provided by the ED to the patient.  Inform the patient they must put on a mask once they arrive to the ED.

## 2024-01-04 NOTE — CONSULTS
Palliative Care Department  [phone]  Palliative Care Initial Consult  Provider LEANDRA Curtis - CNP      PATIENT: Chetna Lundy  : 3/5/1929  MRN: [id number]  ADMISSION DATE: 2024  2:28 PM  Referring Provider: Watson Kamara NP    Palliative Medicine was consulted on hospital day 3 for assistance with Goals of care, Code Status Discussion, end-stage disease, overwhelmed symptoms    HPI:     Clinical Summary:Chetna Lundy is a 89 y.o. y/o female with a history of CHF, CKD, sick sinus syndrome, pacemaker, diabetes, hypothyroidism, hyperlipidemia, hypertension who presented to [facility] on 2024 with admitted with acute respiratory failure with hypoxia secondary to congestive heart failure, large pleural pleural effusion, UTI. S/p left thoracentesis, right chest tube.  Palliative medicine consulted to discuss goals of care, end-stage disease, symptom management.    ASSESSMENT/PLAN:     Pertinent Hospital Diagnoses     Fall  Bilateral pleural effusion  Acute diastolic congestive heart failure  UTI  COVID-19 infection  UTI      Palliative Care Encounter / Counseling Regarding Goals of Care  Please see detailed goals of care discussion as below  At this time, Chetna Lundy, Does Not have capacity for medical decision-making.  Capacity is time limited and situation/question specific  During encounter Rachel was surrogate medical decision-maker  Outcome of goals of care meeting:  Continue with current medical treatment  Continue DNR CCA  [place] [facility] was discussed, family not ready however going to discuss    Code status DNR-CCA  Advanced Directives: no POA or living will in [facility]  Surrogate/Legal NOK:  DalewingRachel (daughter/POA) [phone] Child [phone]     Spiritual assessment: no spiritual distress identified  Bereavement and grief: to be determined  Referrals to: none today    Thank you for the opportunity to participate in the care of Chetna Lundy.

## 2024-01-05 VITALS
WEIGHT: 122 LBS | SYSTOLIC BLOOD PRESSURE: 107 MMHG | OXYGEN SATURATION: 99 % | TEMPERATURE: 98.2 F | RESPIRATION RATE: 18 BRPM | BODY MASS INDEX: 20.33 KG/M2 | HEIGHT: 65 IN | HEART RATE: 50 BPM | DIASTOLIC BLOOD PRESSURE: 40 MMHG

## 2024-01-05 LAB
ALBUMIN SERPL-MCNC: 3.7 G/DL (ref 3.5–5.2)
ALP SERPL-CCNC: 88 U/L (ref 35–104)
ALT SERPL-CCNC: 18 U/L (ref 0–32)
ANION GAP SERPL CALCULATED.3IONS-SCNC: 15 MMOL/L (ref 7–16)
AST SERPL-CCNC: 16 U/L (ref 0–31)
BASOPHILS # BLD: 0.13 K/UL (ref 0–0.2)
BASOPHILS NFR BLD: 1 % (ref 0–2)
BILIRUB SERPL-MCNC: 0.5 MG/DL (ref 0–1.2)
BUN SERPL-MCNC: 32 MG/DL (ref 6–23)
CALCIUM SERPL-MCNC: 9.3 MG/DL (ref 8.6–10.2)
CHLORIDE SERPL-SCNC: 95 MMOL/L (ref 98–107)
CO2 SERPL-SCNC: 30 MMOL/L (ref 22–29)
CREAT SERPL-MCNC: 1 MG/DL (ref 0.5–1)
EOSINOPHIL # BLD: 0 K/UL (ref 0.05–0.5)
EOSINOPHILS RELATIVE PERCENT: 0 % (ref 0–6)
ERYTHROCYTE [DISTWIDTH] IN BLOOD BY AUTOMATED COUNT: 14.5 % (ref 11.5–15)
GFR SERPL CREATININE-BSD FRML MDRD: 52 ML/MIN/1.73M2
GLUCOSE BLD-MCNC: 214 MG/DL (ref 74–99)
GLUCOSE BLD-MCNC: 230 MG/DL (ref 74–99)
GLUCOSE SERPL-MCNC: 242 MG/DL (ref 74–99)
HCT VFR BLD AUTO: 37.5 % (ref 34–48)
HGB BLD-MCNC: 12.1 G/DL (ref 11.5–15.5)
LYMPHOCYTES NFR BLD: 0.26 K/UL (ref 1.5–4)
LYMPHOCYTES RELATIVE PERCENT: 2 % (ref 20–42)
MCH RBC QN AUTO: 35.7 PG (ref 26–35)
MCHC RBC AUTO-ENTMCNC: 32.3 G/DL (ref 32–34.5)
MCV RBC AUTO: 110.6 FL (ref 80–99.9)
MICROORGANISM SPEC CULT: NO GROWTH
MICROORGANISM/AGENT SPEC: NORMAL
MONOCYTES NFR BLD: 0.64 K/UL (ref 0.1–0.95)
MONOCYTES NFR BLD: 4 % (ref 2–12)
NEUTROPHILS NFR BLD: 93 % (ref 43–80)
NEUTS SEG NFR BLD: 13.68 K/UL (ref 1.8–7.3)
NON-GYN CYTOLOGY REPORT: NORMAL
NUCLEATED RED BLOOD CELLS: 1 PER 100 WBC
PLATELET, FLUORESCENCE: 107 K/UL (ref 130–450)
PMV BLD AUTO: 14.1 FL (ref 7–12)
POTASSIUM SERPL-SCNC: 3.4 MMOL/L (ref 3.5–5)
PROT SERPL-MCNC: 6 G/DL (ref 6.4–8.3)
RBC # BLD AUTO: 3.39 M/UL (ref 3.5–5.5)
RBC # BLD: ABNORMAL 10*6/UL
SODIUM SERPL-SCNC: 140 MMOL/L (ref 132–146)
SPECIMEN DESCRIPTION: NORMAL
WBC OTHER # BLD: 14.7 K/UL (ref 4.5–11.5)

## 2024-01-05 PROCEDURE — 6360000002 HC RX W HCPCS

## 2024-01-05 PROCEDURE — 82962 GLUCOSE BLOOD TEST: CPT

## 2024-01-05 PROCEDURE — 6360000002 HC RX W HCPCS: Performed by: INTERNAL MEDICINE

## 2024-01-05 PROCEDURE — 92526 ORAL FUNCTION THERAPY: CPT | Performed by: SPEECH-LANGUAGE PATHOLOGIST

## 2024-01-05 PROCEDURE — 85025 COMPLETE CBC W/AUTO DIFF WBC: CPT

## 2024-01-05 PROCEDURE — 2700000000 HC OXYGEN THERAPY PER DAY

## 2024-01-05 PROCEDURE — 80053 COMPREHEN METABOLIC PANEL: CPT

## 2024-01-05 PROCEDURE — 6370000000 HC RX 637 (ALT 250 FOR IP): Performed by: INTERNAL MEDICINE

## 2024-01-05 PROCEDURE — 99232 SBSQ HOSP IP/OBS MODERATE 35: CPT | Performed by: INTERNAL MEDICINE

## 2024-01-05 PROCEDURE — 36415 COLL VENOUS BLD VENIPUNCTURE: CPT

## 2024-01-05 PROCEDURE — 99232 SBSQ HOSP IP/OBS MODERATE 35: CPT | Performed by: NURSE PRACTITIONER

## 2024-01-05 RX ORDER — MORPHINE SULFATE 2 MG/ML
2 INJECTION, SOLUTION INTRAMUSCULAR; INTRAVENOUS ONCE
Status: COMPLETED | OUTPATIENT
Start: 2024-01-05 | End: 2024-01-05

## 2024-01-05 RX ADMIN — FENTANYL CITRATE 12.5 MCG: 0.05 INJECTION, SOLUTION INTRAMUSCULAR; INTRAVENOUS at 05:20

## 2024-01-05 RX ADMIN — HEPARIN SODIUM 5000 UNITS: 5000 INJECTION INTRAVENOUS; SUBCUTANEOUS at 05:14

## 2024-01-05 RX ADMIN — FENTANYL CITRATE 12.5 MCG: 0.05 INJECTION, SOLUTION INTRAMUSCULAR; INTRAVENOUS at 15:35

## 2024-01-05 RX ADMIN — INSULIN LISPRO 1 UNITS: 100 INJECTION, SOLUTION INTRAVENOUS; SUBCUTANEOUS at 09:14

## 2024-01-05 RX ADMIN — FENTANYL CITRATE 12.5 MCG: 0.05 INJECTION, SOLUTION INTRAMUSCULAR; INTRAVENOUS at 09:12

## 2024-01-05 RX ADMIN — INSULIN LISPRO 1 UNITS: 100 INJECTION, SOLUTION INTRAVENOUS; SUBCUTANEOUS at 12:42

## 2024-01-05 RX ADMIN — MORPHINE SULFATE 2 MG: 2 INJECTION, SOLUTION INTRAMUSCULAR; INTRAVENOUS at 17:04

## 2024-01-05 ASSESSMENT — PAIN SCALES - PAIN ASSESSMENT IN ADVANCED DEMENTIA (PAINAD)
BREATHING: 0
CONSOLABILITY: 0
CONSOLABILITY: 1
BREATHING: 0
NEGVOCALIZATION: 0
TOTALSCORE: 0
NEGVOCALIZATION: 1
NEGVOCALIZATION: 1
FACIALEXPRESSION: 0
TOTALSCORE: 1
CONSOLABILITY: 0
TOTALSCORE: 1
FACIALEXPRESSION: 0
BODYLANGUAGE: 0
BODYLANGUAGE: 0
NEGVOCALIZATION: 1
TOTALSCORE: 5
BODYLANGUAGE: 0
BREATHING: 0
FACIALEXPRESSION: 1
BODYLANGUAGE: 1
CONSOLABILITY: 0
BREATHING: 1
FACIALEXPRESSION: 0

## 2024-01-05 ASSESSMENT — PAIN DESCRIPTION - LOCATION
LOCATION: GENERALIZED
LOCATION: GENERALIZED

## 2024-01-05 ASSESSMENT — PAIN SCALES - GENERAL
PAINLEVEL_OUTOF10: 6
PAINLEVEL_OUTOF10: 6

## 2024-01-05 ASSESSMENT — PAIN SCALES - WONG BAKER: WONGBAKER_NUMERICALRESPONSE: 0

## 2024-01-05 ASSESSMENT — PAIN DESCRIPTION - DESCRIPTORS
DESCRIPTORS: ACHING
DESCRIPTORS: ACHING

## 2024-01-05 NOTE — PROGRESS NOTES
Assessment and Plan  Patient is a 89 y.o. female with the following medical Problems:   Acute respiratory failure with hypoxia acutely decompensated diastolic congestive heart failure with associated large bilateral pleural effusions  Status post right-sided thoracentesis with right-sided pneumothorax  Urinary tract infection, recurrent  Acute metabolic encephalopathy vs delirium with previously undiagnosed vascular dementia?  (Extensive vascular disease related changes, atrophy and prior strokes on admission CT)  Atrial fibrillation on chronic Eliquis  CKD stage II  Sick sinus syndrome status post pacemaker placement  Non-insulin-dependent diabetes mellitus type 2 with hypoglycemia on arrival  Hypertensive urgency with essential hypertension  Hyperlipidemia  Hypothyroidism    Plan of care:  Family is considering transitioning the patient to hospice.  She remains on 3 L nasal cannula.  She continues to be confused.    History of Present Illness:   Patient is a 89-year-old pleasant woman who was admitted on January 2, 2023 with progressive shortness of breath.  CT scan showed worsening bilateral pleural effusions compared to a CT scan of the chest from February 17, 2023.  Patient has been on supplemental oxygen however she is a poor candidate for advanced therapy for COVID-19 due to her age and the likelihood that her symptoms are mostly related to heart failure.    Daily progress:  January 3, 2024: Patient is scheduled for thoracentesis.  She has no fever, chills, or rigors.  She is currently on 5 L nasal cannula.  Echocardiography result is still pending.  She continues to endorse exertional and conversational dyspnea.  She is denying chest pain.    January 4, 2024: Patient underwent right-sided thoracentesis on January 3, 2024.  1100 mL transudative effusion was drained.  She has no fever, chills, or rigors.  She remains on 5 L nasal cannula.  Patient has difficulty swallowing and currently unable to take orally.  She has significant output form right sided chest tube.     January 5, 2024: Patient continues to be lethargic and continues to aspirate.  She remains on 3 L nasal cannula.  She had around 680 mL of output from chest tube.  Family is considering transitioning the patient to hospice care.       Past Medical History:  Past Medical History:   Diagnosis Date    Colitis     Diabetes mellitus (HCC)     GERD (gastroesophageal reflux disease)     Hearing impaired     Hyperlipidemia     Hypertension     Small bowel obstruction (HCC)     Thyroid disease       Past Surgical History:   Procedure Laterality Date    APPENDECTOMY      CATARACT REMOVAL WITH IMPLANT Right 04/15/2014    CATARACT REMOVAL WITH IMPLANT Left 11/15/2016    COLECTOMY N/A 2/18/2023    LAPAROSCOPIC INCARCERATED STRANGULATED RIGHT INGUINAL HERNIA REPAIR WITH MESH performed by Chuy Baron MD at [facility] OR    HYSTERECTOMY (CERVIX STATUS UNKNOWN)      IR CHEST TUBE INSERTION  1/3/2024    IR CHEST TUBE INSERTION 1/3/2024 [facility] SPECIAL PROCEDURES    LAPAROTOMY  02/18/2023    release sbo incarcerated strangulated right inguinal hernia with mesh repair    OTHER SURGICAL HISTORY  08/2012    RIGHT HEMITHYROIDECTOMY    PACEMAKER INSERTION      PACEMAKER PLACEMENT  1995    [place]        Family History:   History reviewed. No pertinent family history.     Allergies:         Lactose intolerance (gi)    Social history:  Social History     Socioeconomic History    Marital status:      Spouse name: Not on file    Number of children: Not on file    Years of education: Not on file    Highest education level: Not on file   Occupational History    Not on file   Tobacco Use    Smoking status: Never    Smokeless tobacco: Never   Vaping Use    Vaping Use: Never used   Substance and Sexual Activity    Alcohol use: Yes     Alcohol/week: 1.0 standard drink of alcohol     Types: 1 Glasses of wine per week     Comment: rarely    Drug use: No    Sexual activity: Not

## 2024-01-05 NOTE — PROGRESS NOTES
HOSPICE [facility]    Consult received. Chart reviewed. Visited patient at bedside. She was yelling out and appeared to be uncomfortable. Spoke with daughter Christa at bedside.     The hospice benefit and philosophy were explained including that hospice is end of life care in which, per Medicare, a patient has a terminal diagnosis that life expectancy would be 6 months or less. Explained that once in hospice care, all aggressive treatments would be stopped and allow nature to takes its course with focus on comfort care for the patient.  Explained hospice services at home, [facility] with room/board private pay unless patient has Medicaid and the [facility] for short-term symptom management and respite.    Christa understands above. Had me call daughter Rachel Michelle who is one of the POA's. She is agreeable to above and would like her mother sent to [facility] for symptom management. Rachel gives her sister permission to sign Hospice consents to send mother to [facility].    Spoke with  Dr. Jeremiah Chaidez who accepted patient for inpatient level of care hospice.     Received bed 112 with a requested transport of 1630. [facility] able to transport at 1630. Ambulance forms placed in soft chart.      Updated bedside nurse and CM. Requested discharge order be obtained. Please leave IV and blancas in place. Please send an extra atrium for the chest tube.      Gave nurse to nurse report to [facility].    Signed consents with daughter Christa Goldman.    Electronically signed by Justyna Anderson RN on 1/5/2024 at 3:00 PM  [phone]; [phone]

## 2024-01-05 NOTE — PROGRESS NOTES
Palliative Care Department  [phone]  Palliative Care Progress Note  Provider LEANDRA Rivera - CNP      PATIENT: Chetna Lundy  : 3/5/1929  MRN: [id number]  ADMISSION DATE: 2024  2:28 PM  Referring Provider: Watson Kamara NP    Palliative Medicine was consulted on hospital day 4 for assistance with Goals of care, Code Status Discussion, end-stage disease, overwhelmed symptoms    HPI:     Clinical Summary:Chetna Lundy is a 89 y.o. y/o female with a history of CHF, CKD, sick sinus syndrome, pacemaker, diabetes, hypothyroidism, hyperlipidemia, hypertension who presented to [facility] on 2024 with admitted with acute respiratory failure with hypoxia secondary to congestive heart failure, large pleural pleural effusion, UTI. S/p left thoracentesis, right chest tube.  Palliative medicine consulted to discuss goals of care, end-stage disease, symptom management.    ASSESSMENT/PLAN:     Pertinent Hospital Diagnoses     Fall  Bilateral pleural effusion  Acute diastolic congestive heart failure  UTI  COVID-19 infection  UTI      Palliative Care Encounter / Counseling Regarding Goals of Care  Please see detailed goals of care discussion as below  At this time, Chetna Lundy, Does Not have capacity for medical decision-making.  Capacity is time limited and situation/question specific  During encounter Rachel & Christa was surrogate medical decision-maker  Outcome of goals of care meeting:  Hospice consult  CODE STATUS discussion    Code status Limited DNI  Advanced Directives: no POA or living will in epic  Surrogate/Legal NOK:  DalePraveen dimasie (daughter/POA) [phone] Child [phone]     Spiritual assessment: no spiritual distress identified  Bereavement and grief: to be determined  Referrals to: none today    Thank you for the opportunity to participate in the care of Chetna Lundy.   SUBJECTIVE:     Details of Conversation:    Chart reviewed.  Bedside RN reached out to palliative medicine in  regards to family requesting to speak with palliative in regards to possible hospice consult.Patient seen at the bedside, alert and oriented x 2, fatigued.  Patient unable to partake in meaningful conversation.  Patient's daughter, Christa, present at the bedside.  Introduced self.  Discussed patient's current condition and comorbidities.  Discussed goals of care CODE STATUS options.  Christa states that she and her siblings have discussed transitioning patient into hospice services as she continues to decline in health.  The patient has been unable to swallow, with increased secretions, and has stated multiple times to \" let her go\".  The patient also expressed to 'let her go' when I was discussing plan of care with Christa.  Christa and her siblings are in agreement with changing CODE STATUS to limited/DNI and hospice consult.  Case management to provide list of hospice services for patient to choose from.  Will await hospice decision.  Support provided.  Palliative medicine will continue to follow.    Prognosis: Poor    OBJECTIVE:     BP (!) 107/40   Pulse 50   Temp 98.2 °F (36.8 °C) (Axillary)   Resp 24   Ht 1.651 m (5' 5\")   Wt 55.3 kg (122 lb)   SpO2 99%   BMI 20.30 kg/m²     Physical Examination:  Gen: elderly, thin, ill-appearing, confused, fatigued  Lungs: Weak moist cough, gurgling sound, right chest tube  Heart: regular rate and rhythm, distant heart tones,   Abdomen: normoactive bowel sounds, soft, non-tender  Extremities: no clubbing, cyanosis or edema  Skin: warm, frail, ecchymosis  Neuro: awake, confused, follows commands    Objective data reviewed: labs, images, records, medication use, vitals, and chart    Time/Communication  Greater than 50% of time spent, total 35 minutes in counseling and coordination of care at the bedside regarding goals of care and symptom management.    Thank you for allowing Palliative Medicine to participate in the care of Chetna Lundy.    Note: This report was completed

## 2024-01-05 NOTE — PLAN OF CARE
Problem: Safety - Adult  Goal: Free from fall injury  1/5/2024 0235 by Tai Rich RN  Outcome: Progressing     Problem: Discharge Planning  Goal: Discharge to home or other facility with appropriate resources  1/5/2024 0235 by Tai Rich RN  Outcome: Progressing     Problem: Pain  Goal: Verbalizes/displays adequate comfort level or baseline comfort level  1/5/2024 0235 by Tai Rich RN  Outcome: Progressing     Problem: Skin/Tissue Integrity  Goal: Absence of new skin breakdown  Description: 1.  Monitor for areas of redness and/or skin breakdown  2.  Assess vascular access sites hourly  3.  Every 4-6 hours minimum:  Change oxygen saturation probe site  4.  Every 4-6 hours:  If on nasal continuous positive airway pressure, respiratory therapy assess nares and determine need for appliance change or resting period.  1/5/2024 0235 by Tai Rich RN  Outcome: Progressing     Problem: ABCDS Injury Assessment  Goal: Absence of physical injury  1/5/2024 0235 by Tai Rich RN  Outcome: Progressing     Problem: Chronic Conditions and Co-morbidities  Goal: Patient's chronic conditions and co-morbidity symptoms are monitored and maintained or improved  1/5/2024 0235 by Tai Rich RN  Outcome: Progressing     Problem: Respiratory - Adult  Goal: Achieves optimal ventilation and oxygenation  1/5/2024 0235 by Tai Rich RN  Outcome: Progressing     Problem: Skin/Tissue Integrity - Adult  Goal: Incisions, wounds, or drain sites healing without S/S of infection  1/5/2024 0235 by Tai Rich RN  Outcome: Progressing

## 2024-01-05 NOTE — PLAN OF CARE
Problem: Safety - Adult  Goal: Free from fall injury  Outcome: Progressing     Problem: Discharge Planning  Goal: Discharge to home or other facility with appropriate resources  Outcome: Progressing     Problem: Pain  Goal: Verbalizes/displays adequate comfort level or baseline comfort level  Outcome: Progressing     Problem: Skin/Tissue Integrity  Goal: Absence of new skin breakdown  Description: 1.  Monitor for areas of redness and/or skin breakdown  2.  Assess vascular access sites hourly  3.  Every 4-6 hours minimum:  Change oxygen saturation probe site  4.  Every 4-6 hours:  If on nasal continuous positive airway pressure, respiratory therapy assess nares and determine need for appliance change or resting period.  Outcome: Progressing     Problem: ABCDS Injury Assessment  Goal: Absence of physical injury  Outcome: Progressing     Problem: Chronic Conditions and Co-morbidities  Goal: Patient's chronic conditions and co-morbidity symptoms are monitored and maintained or improved  Outcome: Progressing     Problem: Respiratory - Adult  Goal: Achieves optimal ventilation and oxygenation  Outcome: Progressing     Problem: Skin/Tissue Integrity - Adult  Goal: Incisions, wounds, or drain sites healing without S/S of infection  Outcome: Progressing

## 2024-01-05 NOTE — PROGRESS NOTES
Speech Language Pathology      NAME:  Chetna Lundy  :  3/5/1929  DATE: 2024  ROOM:  0529/0529-01    Patient seen for dysphagia therapy 20 minutes.  Patient alert and stating she wants to be done.  She does ask for a drink frequently.  Coated spoon of trace amounts of water presented as well as coated spoon with trace amounts of honey thick presented.  She does have slight increase in audible secretions with stimulating her swallow and own saliva production but she is able to manage the secretions and not stimulate a distressful cough.  Discussed goals of care with family and daughter reports they are considering hospice and keeping her comfortable.  Discussed with physician who is in agreement with present lightly coated spoon on honey thick items for comfort to patient if family decides on hospice.  Discussed with family to provide her comfort without distress the lightly coated spoon seemed most effective at providing her mouth moisture and allowing her the taste of the items she is asking for without triggering the distressful cough and over abundance of secretions that she was experiencing yesterday.  Will continue to be available to provide support as needed.     Hypokalemia [E87.6]  Hypoglycemia [E16.2]  Bilateral pleural effusion [J90]  Acute diastolic CHF (congestive heart failure) (HCC) [I50.31]  Fall from bed, initial encounter [W06.XXXA]  Skin tear of left lower leg without complication, initial encounter [S81.812A]  Urinary tract infection without hematuria, site unspecified [N39.0]  Altered mental status, unspecified altered mental status type [R41.82]    [id number]  dysphagia tx    Neeta Oropeza MSCCC/SLP  Speech Language Pathologist  [id number]

## 2024-01-05 NOTE — PROGRESS NOTES
Internal Medicine Progress Note    [facility]    Matt Rodriguez D.O., CHON Clark D.O., CHON Murguia D.O.    Lily Dahl, MSN, APRN, NP-C  Hiram Young, MSN, APRN-CNP  Watson Kamara, MSN, APRN, NP-C     Primary Care Physician: Shaggy Jean MD   Admitting Physician:  Joey Clark DO  Admission date and time: 1/1/2024  2:28 PM    Room:  [facility]0529-  Admitting diagnosis: Hypokalemia [E87.6]  Hypoglycemia [E16.2]  Bilateral pleural effusion [J90]  Acute diastolic CHF (congestive heart failure) (HCC) [I50.31]  Fall from bed, initial encounter [W06.XXXA]  Skin tear of left lower leg without complication, initial encounter [S81.812A]  Urinary tract infection without hematuria, site unspecified [N39.0]  Altered mental status, unspecified altered mental status type [R41.82]    Patient Name: Chetna Lundy  MRN: [id number]    Date of Service: 1/5/2024     Subjective:  Chetna is a 89 y.o. female who was seen and examined today,1/5/2024, at the bedside.  Patient is more alert today but she is stating that she just wants to die.  The daughter is at the bedside.  We spoke with the family regarding PEG tube placement and they are strongly against this.  They stated her mother would not want a feeding tube.  The patient was given small sips of water at the bedside in front of her daughter and the patient aspirated with the smallest sip.  Will reevaluate swallow study at the patient fails the swallow evaluation we will consult hospice and allow the patient to eat.  Daughter stated that she will talk to the rest of his siblings but she is in agreement with this.    Review of systems: Limited by confusion  Constitutional:   Positive for significant fatigue and malaise , - fever/chills  HEENT:   Denies ear pain, sore throat, sinus or eye problems.  Cardiovascular:   Denies any chest pain, irregular heartbeats, or palpitations.  post pacemaker placement  Non-insulin-dependent diabetes mellitus type 2 with hypoglycemia on arrival  Hypertensive urgency with essential hypertension  Hyperlipidemia  Hypothyroidism    Plan:   Supplemental oxygen  Maintain right chest tube to negative 30 centimeters to suction  Reevaluation swallow study  Continue Rocephin  IV steroids  Humalog insulin for glycemic control    More than 50% of my  time was spent at the bedside counseling/coordinating care with the patient and/or family with face to face contact.  This time was spent reviewing notes and laboratory data as well as instructing and counseling the patient. Time I spent with the family or surrogate(s) is included only if the patient was incapable of providing the necessary information or participating in medical decisions. I also discussed the differential diagnosis and all of the proposed management plans with the patient and individuals accompanying the patient.    Chetna requires this high level of physician care and nursing on the [facility] unit due the complexity of decision management and chance of rapid decline or death.  Continued cardiac monitoring and higher level of nursing are required. I am readily available for any further decision-making and intervention.     The patient was seen, examined and then discussed with Dr. Rodriguez.     LEANDRA Antunez CNP  1/5/2024  3:03 PM          I saw and evaluated the patient. I agree with the findings and the plan of care as documented in Watson WYNN note.    Matt Rodriguez DO, FACOI  3:32 PM  1/5/2024

## 2024-01-05 NOTE — CARE COORDINATION
Hospice consult noted.  Spoke with daughter, Christa who is at bedside.  She states she spoke with all siblings regarding hospice provider choice and they would like [facility].  Referral to Intake [phone] and they will have liaison come meet with them.  Patient from [facility] and is a long term care bedhold there and has Medicaid if needed.  Will follow.

## 2024-01-06 NOTE — DISCHARGE SUMMARY
[place] [id number]                               DISCHARGE SUMMARY    PATIENT NAME: GOMEZ CALLEJAS                      :        1929  MED REC NO:   [id number]                            ROOM:       0529  ACCOUNT NO:   [id number]                           ADMIT DATE: 2024  PROVIDER:     Matt Rodriguez DO                  DISCHARGE DATE:  2024    ADMISSION DIAGNOSES:  Acute respiratory failure with hypoxemia; acutely  decompensated diastolic congestive heart failure; excessively large  bilateral pleural effusion secondary to acute COVID-19 infection,  urinary tract infection, recurrent; acute metabolic encephalopathy  versus delirium with vascular dementia; atrial fibrillation on chronic  Eliquis therapy; chronic kidney disease stage IIIB; sick sinus syndrome  status post pacemaker insertion; non-insulin diabetes mellitus type 2;  hypertensive urgency; hyperlipidemia; primary hypothyroidism.    COMPLICATIONS  Pneumothorax.    OPERATIONS PERFORMED:  Thoracentesis and placement of chest tube on the  right.    COMPLICATIONS:  Aspiration pneumonia with family refusing PEG tube or  other intervention, with the family requesting hospice.    CONSULTATION OBTAINED:   Dr. Alas, Palliative care and Hospice.  No  OMT was given.    ADMITTING PHYSICIANS:  Dr. Rodriguez and Dr. Clark    CHIEF COMPLAINT AND HISTORY OF CHIEF COMPLAINT:  This is a 89-year-old  white female who was admitted to [facility].  The patient  presented to the hospital here on 2024.  The patient presented to  the hospital with increased amount of shortness of breath and difficulty  breathing.  ER workup at this time did show large bilateral pleural  effusion with recent COVID pneumonia and the patient was admitted to the  hospital at this time.    PAST MEDICAL HISTORY:  Positive for history of diabetes mellitus,  gastroesophageal reflux

## 2024-01-07 LAB
MICROORGANISM SPEC CULT: NORMAL
MICROORGANISM/AGENT SPEC: NORMAL
REPORT STATUS: NORMAL
SPECIMEN DESCRIPTION: NORMAL

## 2024-01-09 NOTE — PROGRESS NOTES
Physician Progress Note      PATIENT:               GOMEZ CALLEJAS  [facility] #:                  [id number]  :                       3/5/1929  ADMIT DATE:       2024 2:28 PM  DISCH DATE:        2024 6:24 PM  RESPONDING  PROVIDER #:        Joey Clark DO          QUERY TEXT:    Dear ,    Patient admitted with \"Acute respiratory failure with hypoxia acutely   decompensated diastolic congestive heart failure with associated large   bilateral pleural effusions\" per H&P and subsequent notes.   Noted   documentation of \"excessively large bilateral pleural effusion secondary to   acute COVID-19 infection\" by IM on  dc summary note.  If possible, please   clarify and document in progress notes and discharge summary if you are   evaluating and /or treating any of the following:    The medical record reflects the following:  Risk Factors: CHF, COVID-19 +  Clinical Indicators: per ED note: \"CT Chest-Large pleural effusions and   compressive atelectasis with complete collapse of the lower lobes included....   Patient also with fluid overload as she had elevated BNP 1757 and has   anasarca as well as having large pleural effusions bilaterally on CT scan.\";   per H&P: \"large bilateral pleural effusions with suspected decompensated   congestive heart failure... Gentle diuresis will be undertaken and we will   attempt thoracentesis.  2D echocardiogram will be updated....Acute respiratory   failure with hypoxia acutely decompensated diastolic con  Treatment: PCU monitoring, Pulmonary consult, thoracentesis, I&O, IV Lasix 20   mg BID    Thank You,  Maia Cleveland RN, BSN, CDS  Clinical Documentation Improvement Specialist  Options provided:  -- Pleural effusion due to COVID-19 infection  -- Pleural effusion due to Acute diastolic CHF  -- Other - I will add my own diagnosis  -- Disagree - Not applicable / Not valid  -- Disagree - Clinically unable to determine / Unknown  -- Refer to Clinical Documentation

## 2024-01-12 LAB
MICROORGANISM SPEC CULT: NORMAL
MICROORGANISM/AGENT SPEC: NORMAL
SPECIMEN DESCRIPTION: NORMAL

## 2024-01-19 LAB
MICROORGANISM SPEC CULT: NORMAL
MICROORGANISM/AGENT SPEC: NORMAL
SPECIMEN DESCRIPTION: NORMAL

## 2024-01-26 LAB
MICROORGANISM SPEC CULT: NORMAL
MICROORGANISM/AGENT SPEC: NORMAL
SPECIMEN DESCRIPTION: NORMAL

## 2024-02-02 LAB
MICROORGANISM SPEC CULT: NORMAL
MICROORGANISM/AGENT SPEC: NORMAL
SPECIMEN DESCRIPTION: NORMAL

## 2024-02-09 LAB
MICROORGANISM SPEC CULT: NORMAL
MICROORGANISM/AGENT SPEC: NORMAL
SPECIMEN DESCRIPTION: NORMAL

## 2024-02-16 LAB
MICROORGANISM SPEC CULT: NORMAL
MICROORGANISM/AGENT SPEC: NORMAL
SPECIMEN DESCRIPTION: NORMAL

## (undated) DEVICE — SPONGE LAP W18XL18IN WHT COT 4 PLY FLD STRUNG RADPQ DISP ST

## (undated) DEVICE — CLOTH SURG PREP PREOPERATIVE CHLORHEXIDINE GLUC 2% READYPREP

## (undated) DEVICE — [HIGH FLOW INSUFFLATOR,  DO NOT USE IF PACKAGE IS DAMAGED,  KEEP DRY,  KEEP AWAY FROM SUNLIGHT,  PROTECT FROM HEAT AND RADIOACTIVE SOURCES.]: Brand: PNEUMOSURE

## (undated) DEVICE — TROCAR: Brand: KII FIOS FIRST ENTRY

## (undated) DEVICE — TOTAL TRAY, 16FR 10ML SIL FOLEY, URN: Brand: MEDLINE

## (undated) DEVICE — PMI PTFE COATED LAPAROSCOPIC WIRE L-HOOK 44 CM: Brand: PMI

## (undated) DEVICE — PAD,NON-ADHERENT,3X8,STERILE,LF,1/PK: Brand: MEDLINE

## (undated) DEVICE — DOUBLE BASIN SET: Brand: MEDLINE INDUSTRIES, INC.

## (undated) DEVICE — SEALER/DIVIDER LAP SHFT L44CM DIA5MM STR BLNT TIP JAW HND

## (undated) DEVICE — STAPLER INT DIA5MM 25 ABSRB STRP FIX DISP FOR HERN MESH

## (undated) DEVICE — INSUFFLATION NEEDLE TO ESTABLISH PNEUMOPERITONEUM.: Brand: INSUFFLATION NEEDLE

## (undated) DEVICE — YANKAUER,BULB TIP,W/O VENT,RIGID,STERILE: Brand: MEDLINE

## (undated) DEVICE — SYRINGE MED 10ML TRNSLUC BRL PLUNG BLK MRK POLYPR CTRL

## (undated) DEVICE — GLOVE ORANGE PI 7 1/2   MSG9075

## (undated) DEVICE — PLUMEPORT ACTIV LAPAROSCOPIC SMOKE FILTRATION DEVICE: Brand: PLUMEPORT ACTIVE

## (undated) DEVICE — APPLICATOR MEDICATED 26 CC SOLUTION HI LT ORNG CHLORAPREP

## (undated) DEVICE — MARKER,SKIN,WI/RULER AND LABELS: Brand: MEDLINE

## (undated) DEVICE — TROCAR: Brand: KII SLEEVE

## (undated) DEVICE — NDL CNTR 40CT FM MAG: Brand: MEDLINE INDUSTRIES, INC.

## (undated) DEVICE — GARMENT,MEDLINE,DVT,INT,CALF,MED, GEN2: Brand: MEDLINE

## (undated) DEVICE — PACK SURG LAP CHOLE CUSTOM

## (undated) DEVICE — SYRINGE IRRIG 60ML SFT PLIABLE BLB EZ TO GRP 1 HND USE W/

## (undated) DEVICE — TUBING, SUCTION, 1/4" X 10', STRAIGHT: Brand: MEDLINE

## (undated) DEVICE — GOWN,SIRUS,NONRNF,SETINSLV,XL,20/CS: Brand: MEDLINE

## (undated) DEVICE — BLADE ES ELASTOMERIC COAT INSUL DURABLE BEND UPTO 90DEG

## (undated) DEVICE — ELECTRODE PT RET AD L9FT HI MOIST COND ADH HYDRGEL CORDED

## (undated) DEVICE — COVER,LIGHT HANDLE,FLX,1/PK: Brand: MEDLINE INDUSTRIES, INC.

## (undated) DEVICE — NEEDLE HYPO 25GA L1.5IN BLU POLYPR HUB S STL REG BVL STR

## (undated) DEVICE — SOLUTION IRRIG 3000ML 0.9% SOD CHL USP UROMATIC PLAS CONT